# Patient Record
Sex: FEMALE | Race: WHITE | NOT HISPANIC OR LATINO | Employment: FULL TIME | URBAN - METROPOLITAN AREA
[De-identification: names, ages, dates, MRNs, and addresses within clinical notes are randomized per-mention and may not be internally consistent; named-entity substitution may affect disease eponyms.]

---

## 2020-09-09 ENCOUNTER — OFFICE VISIT (OUTPATIENT)
Dept: URGENT CARE | Facility: URGENT CARE | Age: 60
End: 2020-09-09
Payer: COMMERCIAL

## 2020-09-09 VITALS
DIASTOLIC BLOOD PRESSURE: 78 MMHG | HEART RATE: 80 BPM | HEIGHT: 65 IN | SYSTOLIC BLOOD PRESSURE: 158 MMHG | OXYGEN SATURATION: 98 % | BODY MASS INDEX: 33.99 KG/M2 | WEIGHT: 204 LBS | TEMPERATURE: 98.6 F

## 2020-09-09 DIAGNOSIS — R07.9 CHEST PAIN, UNSPECIFIED TYPE: Primary | ICD-10-CM

## 2020-09-09 ASSESSMENT — MIFFLIN-ST. JEOR: SCORE: 1496.22

## 2020-09-10 NOTE — PROGRESS NOTES
"Patient presents with chest pain with palpitations upon laying on her side for the last 3 days.  Patient also has vomiting and has been \"dealing with HTN.\"  She also states she is taking doxycycline that is not improving her sinusitis.  Patient has HTN and DM with a HgA1C of 9.3 (8/28).  Patient sent to ED for evaluation.   with and he will drive.  Patient left in stable condition.    "

## 2021-05-29 ENCOUNTER — RECORDS - HEALTHEAST (OUTPATIENT)
Dept: ADMINISTRATIVE | Facility: CLINIC | Age: 61
End: 2021-05-29

## 2021-08-25 ENCOUNTER — APPOINTMENT (OUTPATIENT)
Dept: CT IMAGING | Facility: HOSPITAL | Age: 61
DRG: 440 | End: 2021-08-25
Attending: STUDENT IN AN ORGANIZED HEALTH CARE EDUCATION/TRAINING PROGRAM
Payer: COMMERCIAL

## 2021-08-25 ENCOUNTER — HOSPITAL ENCOUNTER (INPATIENT)
Facility: HOSPITAL | Age: 61
LOS: 6 days | Discharge: HOME | DRG: 440 | End: 2021-08-31
Attending: STUDENT IN AN ORGANIZED HEALTH CARE EDUCATION/TRAINING PROGRAM | Admitting: HOSPITALIST
Payer: COMMERCIAL

## 2021-08-25 ENCOUNTER — APPOINTMENT (OUTPATIENT)
Dept: ULTRASOUND IMAGING | Facility: HOSPITAL | Age: 61
DRG: 440 | End: 2021-08-25
Attending: STUDENT IN AN ORGANIZED HEALTH CARE EDUCATION/TRAINING PROGRAM
Payer: COMMERCIAL

## 2021-08-25 ENCOUNTER — APPOINTMENT (OUTPATIENT)
Dept: MRI IMAGING | Facility: HOSPITAL | Age: 61
DRG: 440 | End: 2021-08-25
Attending: STUDENT IN AN ORGANIZED HEALTH CARE EDUCATION/TRAINING PROGRAM
Payer: COMMERCIAL

## 2021-08-25 DIAGNOSIS — K85.10 GALLSTONE PANCREATITIS: Primary | ICD-10-CM

## 2021-08-25 DIAGNOSIS — K85.90 ACUTE PANCREATITIS, UNSPECIFIED COMPLICATION STATUS, UNSPECIFIED PANCREATITIS TYPE: ICD-10-CM

## 2021-08-25 LAB
ALBUMIN SERPL-MCNC: 3.3 G/DL (ref 3.5–5)
ALP SERPL-CCNC: 72 U/L (ref 45–120)
ALT SERPL W P-5'-P-CCNC: 18 U/L (ref 0–45)
ANION GAP SERPL CALCULATED.3IONS-SCNC: 11 MMOL/L (ref 5–18)
AST SERPL W P-5'-P-CCNC: 19 U/L (ref 0–40)
BASOPHILS # BLD AUTO: 0 10E3/UL (ref 0–0.2)
BASOPHILS NFR BLD AUTO: 0 %
BILIRUB SERPL-MCNC: 0.7 MG/DL (ref 0–1)
BUN SERPL-MCNC: 11 MG/DL (ref 8–22)
CALCIUM SERPL-MCNC: 9 MG/DL (ref 8.5–10.5)
CHLORIDE BLD-SCNC: 103 MMOL/L (ref 98–107)
CHOLEST SERPL-MCNC: 189 MG/DL
CO2 SERPL-SCNC: 23 MMOL/L (ref 22–31)
CREAT SERPL-MCNC: 0.84 MG/DL (ref 0.6–1.1)
EOSINOPHIL # BLD AUTO: 0.1 10E3/UL (ref 0–0.7)
EOSINOPHIL NFR BLD AUTO: 1 %
ERYTHROCYTE [DISTWIDTH] IN BLOOD BY AUTOMATED COUNT: 13.3 % (ref 10–15)
GFR SERPL CREATININE-BSD FRML MDRD: 75 ML/MIN/1.73M2
GLUCOSE BLD-MCNC: 208 MG/DL (ref 70–125)
GLUCOSE BLDC GLUCOMTR-MCNC: 180 MG/DL (ref 70–125)
GLUCOSE BLDC GLUCOMTR-MCNC: 183 MG/DL (ref 70–125)
GLUCOSE BLDC GLUCOMTR-MCNC: 183 MG/DL (ref 70–125)
HCT VFR BLD AUTO: 33.1 % (ref 35–47)
HDLC SERPL-MCNC: 58 MG/DL
HGB BLD-MCNC: 10.6 G/DL (ref 11.7–15.7)
IMM GRANULOCYTES # BLD: 0 10E3/UL
IMM GRANULOCYTES NFR BLD: 0 %
LDLC SERPL CALC-MCNC: 110 MG/DL
LIPASE SERPL-CCNC: 5004 U/L (ref 0–52)
LYMPHOCYTES # BLD AUTO: 1.5 10E3/UL (ref 0.8–5.3)
LYMPHOCYTES NFR BLD AUTO: 15 %
MAGNESIUM SERPL-MCNC: 1.8 MG/DL (ref 1.8–2.6)
MCH RBC QN AUTO: 27.7 PG (ref 26.5–33)
MCHC RBC AUTO-ENTMCNC: 32 G/DL (ref 31.5–36.5)
MCV RBC AUTO: 87 FL (ref 78–100)
MONOCYTES # BLD AUTO: 0.4 10E3/UL (ref 0–1.3)
MONOCYTES NFR BLD AUTO: 4 %
NEUTROPHILS # BLD AUTO: 8.4 10E3/UL (ref 1.6–8.3)
NEUTROPHILS NFR BLD AUTO: 80 %
NRBC # BLD AUTO: 0 10E3/UL
NRBC BLD AUTO-RTO: 0 /100
PLATELET # BLD AUTO: 236 10E3/UL (ref 150–450)
POTASSIUM BLD-SCNC: 3.8 MMOL/L (ref 3.5–5)
PROT SERPL-MCNC: 7.5 G/DL (ref 6–8)
RBC # BLD AUTO: 3.82 10E6/UL (ref 3.8–5.2)
SARS-COV-2 RNA RESP QL NAA+PROBE: NEGATIVE
SODIUM SERPL-SCNC: 137 MMOL/L (ref 136–145)
TRIGL SERPL-MCNC: 104 MG/DL
WBC # BLD AUTO: 10.4 10E3/UL (ref 4–11)

## 2021-08-25 PROCEDURE — 76705 ECHO EXAM OF ABDOMEN: CPT

## 2021-08-25 PROCEDURE — 99223 1ST HOSP IP/OBS HIGH 75: CPT | Performed by: HOSPITALIST

## 2021-08-25 PROCEDURE — 83735 ASSAY OF MAGNESIUM: CPT | Performed by: HOSPITALIST

## 2021-08-25 PROCEDURE — 85025 COMPLETE CBC W/AUTO DIFF WBC: CPT | Performed by: STUDENT IN AN ORGANIZED HEALTH CARE EDUCATION/TRAINING PROGRAM

## 2021-08-25 PROCEDURE — 74176 CT ABD & PELVIS W/O CONTRAST: CPT

## 2021-08-25 PROCEDURE — 36415 COLL VENOUS BLD VENIPUNCTURE: CPT | Performed by: STUDENT IN AN ORGANIZED HEALTH CARE EDUCATION/TRAINING PROGRAM

## 2021-08-25 PROCEDURE — 250N000012 HC RX MED GY IP 250 OP 636 PS 637: Performed by: HOSPITALIST

## 2021-08-25 PROCEDURE — C9803 HOPD COVID-19 SPEC COLLECT: HCPCS

## 2021-08-25 PROCEDURE — 96360 HYDRATION IV INFUSION INIT: CPT

## 2021-08-25 PROCEDURE — 83690 ASSAY OF LIPASE: CPT | Performed by: STUDENT IN AN ORGANIZED HEALTH CARE EDUCATION/TRAINING PROGRAM

## 2021-08-25 PROCEDURE — 80053 COMPREHEN METABOLIC PANEL: CPT | Performed by: STUDENT IN AN ORGANIZED HEALTH CARE EDUCATION/TRAINING PROGRAM

## 2021-08-25 PROCEDURE — 87635 SARS-COV-2 COVID-19 AMP PRB: CPT | Performed by: STUDENT IN AN ORGANIZED HEALTH CARE EDUCATION/TRAINING PROGRAM

## 2021-08-25 PROCEDURE — 74181 MRI ABDOMEN W/O CONTRAST: CPT

## 2021-08-25 PROCEDURE — 120N000001 HC R&B MED SURG/OB

## 2021-08-25 PROCEDURE — 96361 HYDRATE IV INFUSION ADD-ON: CPT

## 2021-08-25 PROCEDURE — 99285 EMERGENCY DEPT VISIT HI MDM: CPT | Mod: 25

## 2021-08-25 PROCEDURE — 258N000003 HC RX IP 258 OP 636: Performed by: STUDENT IN AN ORGANIZED HEALTH CARE EDUCATION/TRAINING PROGRAM

## 2021-08-25 PROCEDURE — 250N000011 HC RX IP 250 OP 636: Performed by: HOSPITALIST

## 2021-08-25 PROCEDURE — 80061 LIPID PANEL: CPT | Performed by: STUDENT IN AN ORGANIZED HEALTH CARE EDUCATION/TRAINING PROGRAM

## 2021-08-25 RX ORDER — CHOLECALCIFEROL (VITAMIN D3) 50 MCG
50 TABLET ORAL DAILY
COMMUNITY

## 2021-08-25 RX ORDER — NICOTINE POLACRILEX 4 MG
15-30 LOZENGE BUCCAL
Status: DISCONTINUED | OUTPATIENT
Start: 2021-08-25 | End: 2021-08-31 | Stop reason: HOSPADM

## 2021-08-25 RX ORDER — ONDANSETRON 2 MG/ML
4 INJECTION INTRAMUSCULAR; INTRAVENOUS EVERY 6 HOURS PRN
Status: DISCONTINUED | OUTPATIENT
Start: 2021-08-25 | End: 2021-08-31 | Stop reason: HOSPADM

## 2021-08-25 RX ORDER — SODIUM CHLORIDE, SODIUM LACTATE, POTASSIUM CHLORIDE, CALCIUM CHLORIDE 600; 310; 30; 20 MG/100ML; MG/100ML; MG/100ML; MG/100ML
125 INJECTION, SOLUTION INTRAVENOUS CONTINUOUS
Status: ACTIVE | OUTPATIENT
Start: 2021-08-25 | End: 2021-08-27

## 2021-08-25 RX ORDER — ALBUTEROL SULFATE 90 UG/1
2 AEROSOL, METERED RESPIRATORY (INHALATION) EVERY 6 HOURS PRN
COMMUNITY

## 2021-08-25 RX ORDER — KETOROLAC TROMETHAMINE 15 MG/ML
15 INJECTION, SOLUTION INTRAMUSCULAR; INTRAVENOUS EVERY 6 HOURS PRN
Status: DISCONTINUED | OUTPATIENT
Start: 2021-08-25 | End: 2021-08-27

## 2021-08-25 RX ORDER — MULTIPLE VITAMINS W/ MINERALS TAB 9MG-400MCG
1 TAB ORAL DAILY
COMMUNITY

## 2021-08-25 RX ORDER — SIMVASTATIN 20 MG
20 TABLET ORAL AT BEDTIME
Status: ON HOLD | COMMUNITY
End: 2021-08-31

## 2021-08-25 RX ORDER — DEXTROSE MONOHYDRATE 25 G/50ML
25-50 INJECTION, SOLUTION INTRAVENOUS
Status: DISCONTINUED | OUTPATIENT
Start: 2021-08-25 | End: 2021-08-31 | Stop reason: HOSPADM

## 2021-08-25 RX ORDER — BISACODYL 10 MG
10 SUPPOSITORY, RECTAL RECTAL DAILY PRN
Status: DISCONTINUED | OUTPATIENT
Start: 2021-08-25 | End: 2021-08-31 | Stop reason: HOSPADM

## 2021-08-25 RX ORDER — ALBUTEROL SULFATE 90 UG/1
2 AEROSOL, METERED RESPIRATORY (INHALATION) EVERY 6 HOURS PRN
Status: DISCONTINUED | OUTPATIENT
Start: 2021-08-25 | End: 2021-08-31 | Stop reason: HOSPADM

## 2021-08-25 RX ADMIN — SODIUM CHLORIDE, POTASSIUM CHLORIDE, SODIUM LACTATE AND CALCIUM CHLORIDE 125 ML/HR: 600; 310; 30; 20 INJECTION, SOLUTION INTRAVENOUS at 15:57

## 2021-08-25 RX ADMIN — INSULIN ASPART 1 UNITS: 100 INJECTION, SOLUTION INTRAVENOUS; SUBCUTANEOUS at 19:12

## 2021-08-25 RX ADMIN — HUMAN INSULIN 8 UNITS: 100 INJECTION, SUSPENSION SUBCUTANEOUS at 17:47

## 2021-08-25 RX ADMIN — KETOROLAC TROMETHAMINE 15 MG: 15 INJECTION, SOLUTION INTRAMUSCULAR; INTRAVENOUS at 22:04

## 2021-08-25 RX ADMIN — SODIUM CHLORIDE, POTASSIUM CHLORIDE, SODIUM LACTATE AND CALCIUM CHLORIDE 125 ML/HR: 600; 310; 30; 20 INJECTION, SOLUTION INTRAVENOUS at 11:30

## 2021-08-25 RX ADMIN — SODIUM CHLORIDE, POTASSIUM CHLORIDE, SODIUM LACTATE AND CALCIUM CHLORIDE 1000 ML: 600; 310; 30; 20 INJECTION, SOLUTION INTRAVENOUS at 09:08

## 2021-08-25 ASSESSMENT — ACTIVITIES OF DAILY LIVING (ADL): DEPENDENT_IADLS:: INDEPENDENT

## 2021-08-25 ASSESSMENT — MIFFLIN-ST. JEOR: SCORE: 1519.85

## 2021-08-25 NOTE — H&P
[facility]    History and Physical - Hospitalist Service       Date of Admission:  2021  Jo-Ann Jeffery,  1960, MRN [id number]  PCP: Shell Hassan    Assessment & Plan      Jo-Ann Jeffery is a 61 year old female admitted on 2021. She has history of diabetes, lumbar disc disease, osteoarthritis, 47 medication allergies presents for evaluation of abdominal pain found to have pancreatitis    #Acute pancreatitis  Uncertain etiology.  No biliary stone seen.  Patient does not use alcohol.  No clear provoking medication, does use low-dose of ibuprofen.  Triglycerides checked and okay.  Has not had pancreatitis in the past.  MRCP showing some mild intra and extrahepatic bile duct dilation tapering smoothly to the ampulla with no stone, stricture, or mass.  Questioning whether this is duct narrowing secondary to pancreatic inflammation itself.  My inclination is that she would not need ERCP for this as long as she is clinically improving, and could consider another MRCP in perhaps 2 months to make sure it got better.  Due to her extensive medication allergies we are going to be very cautious what we use to treat her pancreatitis and try to avoid procedures.  She is okay with trial of IV Toradol.  This is only for if pain is severe-I am a little bit concerned that the only clear provocation for pancreatitis is NSAID in the first place.  IV fluid  N.p.o. except ice chips  GI consultation  Nurse paged me patient is nauseated and now wants something for nausea, Zofran not on allergy list so I did order this if patient wants it    #Insulin-dependent diabetes  Home regimen just NPH 20 units twice daily  Here continue 8 units twice daily for basal insulin coverage  Sliding scale    #Leg cramps  Patient is distressed that with n.p.o. I am not continuing her calcium and magnesium supplement.  Reassured her we can check her calcium and magnesium levels.  Heating pad ok    #47 medication  allergies  Patient saw allergist recently  If she needs abx, she tolerates doxy       Diet: NPO for Medical/Clinical Reasons Except for: Ice Chips    DVT Prophylaxis: Moderate risk. SCDs    Stearns Catheter: Not present  Central Lines: None  Code Status: Full Code Full    Clinically Significant Risk Factors Present on Admission                   Disposition Plan   Expected discharge: 08/27/2021 recommended to prior living arrangement once adequate pain management/ tolerating PO medications.     The patient's care was discussed with the Patient and Patient's Family.    Roselia Moody MD  [facility]  Text page via [facility] Paging/Directory      ______________________________________________________________________    Chief Complaint   pain    History of Present Illness   Jo-Ann Jeffery is a 61 year old female who presents for abdominal pain  She reports about 2 weeks ago, her daughter and  were having symptoms like stomach flu.  Then about 4 days ago, patient began to have similar symptoms with abdominal pain, nausea, vomiting, and low-grade fevers.  3 days ago, symptoms got worse.  T-max 99.4F.  She has not been able to tolerate much oral intake, has been dry heaving.  Belly has seemed bloated.  She had not been having many bowel movements.  She had been very tired.  She has chronic low back pain but this seemed worse.  She was having some black stools, though those have now turned back to brown.  She had 3 bowel movements already in the ER.  She is happy about this since she had not been stooling much at home.  She notes she did have some shortness of breath 2 days ago.  Complains of a headache now after lying in the hospital bed for a while.  Notes that heating pad was helping her abdominal pain quite a bit and would appreciate using this in the hospital as well.  She is worried about her 47 allergies and whether there are any medications that can help her symptoms in the hospital.  She  typically takes 200 mg of ibuprofen at home for pain.    Review of Systems    The 10 point Review of Systems is negative other than noted in the HPI or here.    Past Medical History    I have reviewed this patient's medical history and updated it with pertinent information if needed.   DM  Obesity  LBP  Lumbar disc disease  OA  Some kind of muscle inflammation disorder she is not sure of the name    Past Surgical History   I have reviewed this patient's surgical history and updated it with pertinent information if needed.  No previous surgeries    Social History   I have reviewed this patient's social history and updated it with pertinent information if needed.  Social History     Tobacco Use     Smoking status: Never Smoker     Smokeless tobacco: Never Used   Substance Use Topics     Alcohol use: None     Drug use: None       Family History   I have reviewed this patient's family history and updated it with pertinent information if needed.  Family History   Problem Relation Age of Onset     Syncope Mother      Hypertension Mother      Cerebrovascular Disease Father 62     Diabetes Father      Cerebrovascular Disease Brother 63     Gallbladder Disease Maternal Grandmother      Breast Cancer Maternal Grandmother      Gallbladder Disease Maternal Aunt      Throat cancer Maternal Aunt        Prior to Admission Medications   Prior to Admission Medications   Prescriptions Last Dose Informant Patient Reported? Taking?   Zinc Oxide-Vitamin C (ZINC PLUS VITAMIN C PO) 8/24/2021 at am  Yes Yes   Sig: Take 3 tablets by mouth daily   albuterol (PROAIR HFA/PROVENTIL HFA/VENTOLIN HFA) 108 (90 Base) MCG/ACT inhaler Past Month at July  Yes Yes   Sig: Inhale 2 puffs into the lungs every 6 hours as needed for shortness of breath / dyspnea or wheezing   calcium-magnesium (CALMAG) 500-250 MG TABS per tablet 8/24/2021 at hs  Yes Yes   Sig: Take 2 tablets by mouth every evening   calcium-magnesium (CALMAG) 500-250 MG TABS per tablet PRN   Yes Yes   Sig: Take 2 tablets by mouth daily as needed (anxiety)    insulin  UNIT/ML vial 8/24/2021 at pm  Yes Yes   Sig: Inject 20 Units Subcutaneous 2 times daily (before meals)   multivitamin w/minerals (THERA-VIT-M) tablet 8/24/2021 at am  Yes Yes   Sig: Take 1 tablet by mouth daily   simvastatin (ZOCOR) 20 MG tablet NOT TAKING  Yes No   Sig: Take 20 mg by mouth At Bedtime   vitamin D3 (CHOLECALCIFEROL) 50 mcg (2000 units) tablet 8/24/2021 at am  Yes Yes   Sig: Take 50 mcg by mouth daily 3 gummies = 2000 units      Facility-Administered Medications: None     Allergies   Allergies   Allergen Reactions     Azithromycin Hives     Cephalexin Hives     Contrast Dye Shortness Of Breath     Erythromycin Hives     hives       Moxifloxacin Hives     Penicillins Hives     nausea       Soap Hives     Tide soap, Soft soap, Hien Dish soap     Sulfamethoxazole-Trimethoprim Nausea     Amides      Aminoglycosides      Amitriptyline      Antipyrine-Benzocaine      Cefprozil Hives     Chocolate Flavor      Annotation: pudding       Ciprofloxacin Hives     Clindamycin Other (See Comments)     Severe diarrhea     Clonazepam      Hives       Dairy Products [Milk Protein Extract]      Tachycardia       Diphenhydramine Other (See Comments)     Racing heart     Fluoxetine      Hives       Glipizide Other (See Comments)     Muscle pain/muscle twitching     Guaifenesin & Derivatives      Hypertension     Hydrocodone-Acetaminophen      Iodides      swells up, burns      Iron      Lactulose Nausea and Vomiting     Latex Other (See Comments)     hands break out with latex gloves     Loperamide      Macrolides      Hives       Memantine      Metformin Headache     Methylprednisolone Nausea and Swelling     Metoclopramide      Morphine      Neomycin      ear drops-ears swell up inside     Prochlorperazine      Pseudoephedrine-Guaifenesin Cr      Quinolones      Other reaction       Sodium Chloride Swelling     Strawberry (Diagnostic)  Swelling     Sulfa Drugs      Tartrazine Difficulty breathing     Acetic Acid Rash     Antipyrine Rash     Food Palpitations     Chocolate and butterscottch, strawberries, walnuts     Imidazole Antifungals Rash and Unknown     Nuts Palpitations     Chocolate and butterscottch, strawberries, walnuts       Physical Exam   Vital Signs: Temp: 98.5  F (36.9  C) Temp src: Oral BP: (!) 140/69 Pulse: 88   Resp: 19 SpO2: 95 % O2 Device: None (Room air)    Weight: 210 lbs 5 oz    General: in no apparent distress, non-toxic and alert female lying in hospital bed oriented x3  HEENT: Head normocephalic atraumatic, oral mucosa moist. Sclerae anicteric  CV: Regular rhythm, normal rate, no murmurs  Resp: No wheezes, no rales or rhonchi, no focal consolidations  GI: Belly soft, nondistended, nontender, bowel sounds present  Skin: No rashes or lesions  Extremities: No peripheral edema  Psych: Normal affect, mood euthymic  Neuro: CNII-XII grossly intact, moving all 4 extremities    Data   Data reviewed today: I reviewed all medications, new labs and imaging results over the last 24 hours.    Recent Results (from the past 12 hour(s))   Comprehensive metabolic panel    Collection Time: 08/25/21  8:57 AM   Result Value Ref Range    Sodium 137 136 - 145 mmol/L    Potassium 3.8 3.5 - 5.0 mmol/L    Chloride 103 98 - 107 mmol/L    Carbon Dioxide (CO2) 23 22 - 31 mmol/L    Anion Gap 11 5 - 18 mmol/L    Urea Nitrogen 11 8 - 22 mg/dL    Creatinine 0.84 0.60 - 1.10 mg/dL    Calcium 9.0 8.5 - 10.5 mg/dL    Glucose 208 (H) 70 - 125 mg/dL    Alkaline Phosphatase 72 45 - 120 U/L    AST 19 0 - 40 U/L    ALT 18 0 - 45 U/L    Protein Total 7.5 6.0 - 8.0 g/dL    Albumin 3.3 (L) 3.5 - 5.0 g/dL    Bilirubin Total 0.7 0.0 - 1.0 mg/dL    GFR Estimate 75 >60 mL/min/1.73m2   Lipase    Collection Time: 08/25/21  8:57 AM   Result Value Ref Range    Lipase 5,004 (H) 0 - 52 U/L   CBC with platelets and differential    Collection Time: 08/25/21  8:57 AM   Result  Value Ref Range    WBC Count 10.4 4.0 - 11.0 10e3/uL    RBC Count 3.82 3.80 - 5.20 10e6/uL    Hemoglobin 10.6 (L) 11.7 - 15.7 g/dL    Hematocrit 33.1 (L) 35.0 - 47.0 %    MCV 87 78 - 100 fL    MCH 27.7 26.5 - 33.0 pg    MCHC 32.0 31.5 - 36.5 g/dL    RDW 13.3 10.0 - 15.0 %    Platelet Count 236 150 - 450 10e3/uL    % Neutrophils 80 %    % Lymphocytes 15 %    % Monocytes 4 %    % Eosinophils 1 %    % Basophils 0 %    % Immature Granulocytes 0 %    NRBCs per 100 WBC 0 <1 /100    Absolute Neutrophils 8.4 (H) 1.6 - 8.3 10e3/uL    Absolute Lymphocytes 1.5 0.8 - 5.3 10e3/uL    Absolute Monocytes 0.4 0.0 - 1.3 10e3/uL    Absolute Eosinophils 0.1 0.0 - 0.7 10e3/uL    Absolute Basophils 0.0 0.0 - 0.2 10e3/uL    Absolute Immature Granulocytes 0.0 <=0.0 10e3/uL    Absolute NRBCs 0.0 10e3/uL   Lipid Profile    Collection Time: 08/25/21  8:57 AM   Result Value Ref Range    Cholesterol 189 <=199 mg/dL    Triglycerides 104 <=149 mg/dL    Direct Measure HDL 58 >=50 mg/dL    LDL Cholesterol Calculated 110 <=129 mg/dL   Asymptomatic COVID-19 Virus (Coronavirus) by PCR Nasopharyngeal    Collection Time: 08/25/21  3:59 PM    Specimen: Nasopharyngeal; Swab   Result Value Ref Range    SARS CoV2 PCR Negative Negative   Glucose by meter    Collection Time: 08/25/21  5:45 PM   Result Value Ref Range    GLUCOSE BY METER POCT 183 (H) 70 - 125 mg/dL   Glucose by meter    Collection Time: 08/25/21  6:45 PM   Result Value Ref Range    GLUCOSE BY METER POCT 183 (H) 70 - 125 mg/dL   Glucose by meter    Collection Time: 08/25/21  8:21 PM   Result Value Ref Range    GLUCOSE BY METER POCT 180 (H) 70 - 125 mg/dL

## 2021-08-25 NOTE — CONSULTS
Care Management Initial Consult    General Information  Assessment completed with: Patient, Children, pt and dtr Laura  Type of CM/SW Visit: Initial Assessment    Primary Care Provider verified and updated as needed: Yes   Readmission within the last 30 days: no previous admission in last 30 days   Return Category: Progression of disease  Reason for Consult: discharge planning  Advance Care Planning: Advance Care Planning Reviewed: no concerns identified, questions answered  given HCD       Communication Assessment  Patient's communication style: spoken language (English or Bilingual)             Cognitive  Cognitive/Neuro/Behavioral: WDL                      Living Environment:   People in home: spouse, child(dora), adult     Current living Arrangements:        Able to return to prior arrangements:         Family/Social Support:  Care provided by:    Provides care for:                  Description of Support System:           Current Resources:   Patient receiving home care services: No     Community Resources: None  Equipment currently used at home: none  Supplies currently used at home: None    Employment/Financial:  Employment Status:          Financial Concerns: No concerns identified   Referral to Financial Counselor: No       Lifestyle & Psychosocial Needs:  Social Determinants of Health     Tobacco Use: Low Risk      Smoking Tobacco Use: Never Smoker     Smokeless Tobacco Use: Never Used   Alcohol Use:      Frequency of Alcohol Consumption:      Average Number of Drinks:      Frequency of Binge Drinking:    Financial Resource Strain:      Difficulty of Paying Living Expenses:    Food Insecurity:      Worried About Running Out of Food in the Last Year:      Ran Out of Food in the Last Year:    Transportation Needs:      Lack of Transportation (Medical):      Lack of Transportation (Non-Medical):    Physical Activity:      Days of Exercise per Week:      Minutes of Exercise per Session:    Stress:       Feeling of Stress :    Social Connections:      Frequency of Communication with Friends and Family:      Frequency of Social Gatherings with Friends and Family:      Attends Oriental orthodox Services:      Active Member of Clubs or Organizations:      Attends Club or Organization Meetings:      Marital Status:    Intimate Partner Violence:      Fear of Current or Ex-Partner:      Emotionally Abused:      Physically Abused:      Sexually Abused:    Depression:      PHQ-2 Score:    Housing Stability:      Unable to Pay for Housing in the Last Year:      Number of Places Lived in the Last Year:      Unstable Housing in the Last Year:        Functional Status:  Prior to admission patient needed assistance:   Dependent ADLs:: Independent  Dependent IADLs:: Independent  Assesssment of Functional Status: At functional baseline    Mental Health Status:  Mental Health Status: No Current Concerns       Chemical Dependency Status:                Values/Beliefs:  Spiritual, Cultural Beliefs, Oriental orthodox Practices, Values that affect care:                 Additional Information:  Assessed, lives w/spouse and dtr, Laura [phone], spouse will transport at discharge, no svcs and independent. Pt did ask to see a non-[facility] , the [facility] [place] [place] and primary RN made aware of request, may not see one tonight. Given HCD form.       Elvis Baumann RN

## 2021-08-25 NOTE — PHARMACY-ADMISSION MEDICATION HISTORY
Pharmacy Note - Admission Medication History    ______________________________________________________________________    Prior To Admission (PTA) med list completed and updated in EMR.       PTA Med List   Medication Sig Last Dose     albuterol (PROAIR HFA/PROVENTIL HFA/VENTOLIN HFA) 108 (90 Base) MCG/ACT inhaler Inhale 2 puffs into the lungs every 6 hours as needed for shortness of breath / dyspnea or wheezing Past Month at July     calcium-magnesium (CALMAG) 500-250 MG TABS per tablet Take 2 tablets by mouth every evening 8/24/2021 at hs     calcium-magnesium (CALMAG) 500-250 MG TABS per tablet Take 2 tablets by mouth daily as needed (anxiety)  PRN     insulin  UNIT/ML vial Inject 20 Units Subcutaneous 2 times daily (before meals) 8/24/2021 at pm     multivitamin w/minerals (THERA-VIT-M) tablet Take 1 tablet by mouth daily 8/24/2021 at am     vitamin D3 (CHOLECALCIFEROL) 50 mcg (2000 units) tablet Take 50 mcg by mouth daily 3 gummies = 2000 units 8/24/2021 at am     Zinc Oxide-Vitamin C (ZINC PLUS VITAMIN C PO) Take 3 tablets by mouth daily 8/24/2021 at am       Information source(s): [facility]  Method of interview communication: in-person    Summary of Changes to PTA Med List  New: all  Discontinued: hydroxyzine, ondansetron  Changed:     Patient was asked about OTC/herbal products specifically.  PTA med list reflects this.    In the past week, patient estimated taking medication this percent of the time:  greater than 90%.    Allergies were reviewed, assessed, and updated with the patient.      Patient does not use any multi-dose medications prior to admission.    The information provided in this note is only as accurate as the sources available at the time of the update(s).    Thank you,  Lore Oakley, [facility]  8/25/2021 1:55 PM

## 2021-08-25 NOTE — ED PROVIDER NOTES
Emergency Department Encounter         FINAL IMPRESSION:  Pancreatitis         ED COURSE AND MEDICAL DECISION MAKING   8:35 AM I met with the patient for initial exam and diagnoses. Discussed plan of care and ED course including labs and imaging.  11:29 AM I rechecked and updated the patient. She says she can't stay in the hospital because she has no insurance and, thus, can not pay the hospital bill. Plan to give her fluids and then reevaluate her for discharge.  12:18 PM I talked with the patient about discharge plans.    ED Course as of Aug 25 1427   Wed Aug 25, 2021   0842 Patient is a morbidly obese 61-year-old hypertensive diabetic female here with 4 days of nausea vomiting and decreased ability to tolerate p.o.  Also with increasing abdominal pain.  Patient's 2 other family numbers had similar symptoms over the past few weeks.  Denies any chest pain or trouble breathing.  Low-grade fevers.  Normal urination.  Normal bowel movements.  No leg swelling.  On arrival her vitals are stable.  She looks well clinically.  Heart and lungs normal.  Generalized abdominal discomfort to palpation.  No guarding rebound.  Patient declining pain medication.  No nausea at this time.  Plan for labs CT fluids and reevaluate.      1427 Patient accepted from Dr. Moody hospitalist.          Pancreatitis on labs and imaging.  Mild dilatation of patient's biliary system.  MRCP ordered.  -Plan for admission.  No beds in the hospital system and for however patient does not want to be transferred because of financial reasons.  She will be boarded here.  -                At the conclusion of the encounter I discussed the results of all the tests and the disposition. The questions were answered. The patient or family acknowledged understanding and was agreeable with the care plan.                  MEDICATIONS GIVEN IN THE EMERGENCY DEPARTMENT:  Medications   lactated ringers BOLUS 1,000 mL (0 mLs Intravenous Stopped 8/25/21 4478)      Followed by   lactated ringers infusion (125 mL/hr Intravenous New Bag 8/25/21 1130)       NEW PRESCRIPTIONS STARTED AT TODAY'S ED VISIT:  New Prescriptions    No medications on file       HPI     Patient information obtained from: patient     Use of Interpretor: N/A    Jo-Ann Jeffery is a 61 year old female with a pertinent history of DM type II, hyperlipidemia, vitamin D deficiency, anemia who presents to this ED by car for evaluation of abdominal pain.    About 3 days ago, patient was onset with abdominal pain that has worsened in severity 2 days ago; with associated nausea, vomiting, fever of 99.4 F. Patient describes abdominal pain as feeling bloated and pressure. In the ED, nausea and vomiting has since resolved. Patient reports  and daughter had a stomach virus and had similar symptoms as well.  Denies diarrhea, chest pain, shortness of breath.She reports not normally eating or drinking much due to being diabetic. No history of surgery or other medical complications.    REVIEW OF SYSTEMS:  Review of Systems   Constitutional: Negative for malaise. Positive for fever.  HEENT: Negative runny nose, sore throat, ear pain, neck pain  Respiratory: Negative for shortness of breath, cough, congestion  Cardiovascular: Negative for chest pain, leg edema  Gastrointestinal: Negative for abdominal distention, abdominal pain, constipation, diarrhea. Positive for nausea, vomiting.  Genitourinary: Negative for dysuria and hematuria.   Integument: Negative for rash, skin breakdown  Neurological: Negative for paresthesias, weakness, headache.  Musculoskeletal: Negative for joint pain, joint swelling      All other systems reviewed and are negative.          MEDICAL HISTORY     No past medical history on file.    No past surgical history on file.    Social History     Tobacco Use     Smoking status: Never Smoker     Smokeless tobacco: Never Used   Substance Use Topics     Alcohol use: Not on file     Drug use: Not on  file       hydrOXYzine HCL (ATARAX) 25 MG tablet  ondansetron (ZOFRAN-ODT) 4 MG disintegrating tablet            PHYSICAL EXAM     BP (!) 189/82   Pulse 81   Temp 98.2  F (36.8  C) (Oral)   Resp 16   Wt 93.4 kg (206 lb)   SpO2 97%   BMI 34.28 kg/m        PHYSICAL EXAM:     General: Patient appears well, nontoxic, comfortable  HEENT: Moist mucous membranes, no tongue swelling.  No head trauma.  No midline neck pain.  Cardiovascular: Normal rate, normal rhythm, no extremity edema.  No appreciable murmur.  Respiratory: No signs of respiratory distress, lungs are clear to auscultation bilaterally with no wheezes rhonchi or rales.  Abdominal: Soft, generalized tenderness, nondistended, no palpable masses, no guarding, no rebound. Generalized abdominal discomfort to palpation.  Musculoskeletal: Full range of motion of joints, no deformities appreciated.  Neurological: Alert and oriented, grossly neurologically intact.  Psychological: Normal affect and mood.  Integument: No rashes appreciated          RESULTS       Labs Ordered and Resulted from Time of ED Arrival Up to the Time of Departure from the ED   COMPREHENSIVE METABOLIC PANEL - Abnormal; Notable for the following components:       Result Value    Glucose 208 (*)     Albumin 3.3 (*)     All other components within normal limits   LIPASE - Abnormal; Notable for the following components:    Lipase 5,004 (*)     All other components within normal limits   CBC WITH PLATELETS AND DIFFERENTIAL - Abnormal; Notable for the following components:    Hemoglobin 10.6 (*)     Hematocrit 33.1 (*)     Absolute Neutrophils 8.4 (*)     All other components within normal limits   CBC WITH PLATELETS & DIFFERENTIAL    Narrative:     The following orders were created for panel order CBC with platelets differential.  Procedure                               Abnormality         Status                     ---------                               -----------         ------                      CBC with platelets and d...[[id number]]  Abnormal            Final result                 Please view results for these tests on the individual orders.   PERIPHERAL IV CATHETER       Abdomen US, limited (RUQ only)   Final Result   IMPRESSION:   1.  Mild extrahepatic biliary ductal dilatation and borderline dilatation of the main pancreatic duct. No obstructing stone is identified on this exam. Further evaluation with MRCP may be helpful.   2.  8 mm non shadowing nodular structure in the gallbladder neck, more likely a gallbladder polyp rather than gallstone. Recommend 6-12 month ultrasound follow-up. No evidence of acute cholecystitis.            CT Abdomen Pelvis w/o Contrast   Final Result   IMPRESSION:    1.  Acute pancreatitis.   2.  Cholelithiasis.                           PROCEDURES:  Procedures:  Procedures       I, Alfredo Marlow am serving as a scribe to document services personally performed by Casey Liz DO, based on my observations and the provider's statements to me.  I, Casey Liz DO, attest that Alfredo Marlow is acting in a scribe capacity, has observed my performance of the services and has documented them in accordance with my direction.    Casey Liz DO  Emergency Medicine  [facility] EMERGENCY DEPARTMENT     Casey Liz DO  08/25/21 1428       Casey Liz DO  08/25/21 1523

## 2021-08-25 NOTE — ED TRIAGE NOTES
Patient presents here with abdominal pain accompanied by low grade fever, retching. No diarrhea. Her family members have had similar symptoms as well. Increased fatigue. She is an insulin depentent diabetic. Her blood sugars have been in the 150's this morning

## 2021-08-25 NOTE — ED NOTES
Called lab, add on labs for lipid profile and triglycerides were not yet started, they will run them now.

## 2021-08-26 LAB
ALBUMIN SERPL-MCNC: 3.1 G/DL (ref 3.5–5)
ALP SERPL-CCNC: 67 U/L (ref 45–120)
ALT SERPL W P-5'-P-CCNC: 16 U/L (ref 0–45)
ANION GAP SERPL CALCULATED.3IONS-SCNC: 11 MMOL/L (ref 5–18)
AST SERPL W P-5'-P-CCNC: 18 U/L (ref 0–40)
BILIRUB DIRECT SERPL-MCNC: 0.2 MG/DL
BILIRUB SERPL-MCNC: 0.7 MG/DL (ref 0–1)
BUN SERPL-MCNC: 8 MG/DL (ref 8–22)
CALCIUM SERPL-MCNC: 9.3 MG/DL (ref 8.5–10.5)
CALCIUM, IONIZED MEASURED: 1.16 MMOL/L (ref 1.11–1.3)
CHLORIDE BLD-SCNC: 107 MMOL/L (ref 98–107)
CO2 SERPL-SCNC: 23 MMOL/L (ref 22–31)
CREAT SERPL-MCNC: 0.77 MG/DL (ref 0.6–1.1)
ERYTHROCYTE [DISTWIDTH] IN BLOOD BY AUTOMATED COUNT: 13.2 % (ref 10–15)
GFR SERPL CREATININE-BSD FRML MDRD: 84 ML/MIN/1.73M2
GGT SERPL-CCNC: 18 U/L (ref 0–50)
GLUCOSE BLD-MCNC: 155 MG/DL (ref 70–125)
GLUCOSE BLDC GLUCOMTR-MCNC: 128 MG/DL (ref 70–125)
GLUCOSE BLDC GLUCOMTR-MCNC: 154 MG/DL (ref 70–125)
GLUCOSE BLDC GLUCOMTR-MCNC: 157 MG/DL (ref 70–125)
GLUCOSE BLDC GLUCOMTR-MCNC: 179 MG/DL (ref 70–125)
HBA1C MFR BLD: 8.9 %
HCT VFR BLD AUTO: 33.9 % (ref 35–47)
HGB BLD-MCNC: 10.8 G/DL (ref 11.7–15.7)
ION CA PH 7.4: 1.15 MMOL/L (ref 1.11–1.3)
MCH RBC QN AUTO: 27.6 PG (ref 26.5–33)
MCHC RBC AUTO-ENTMCNC: 31.9 G/DL (ref 31.5–36.5)
MCV RBC AUTO: 87 FL (ref 78–100)
PH: 7.39 (ref 7.35–7.45)
PLATELET # BLD AUTO: 248 10E3/UL (ref 150–450)
POTASSIUM BLD-SCNC: 4 MMOL/L (ref 3.5–5)
PROT SERPL-MCNC: 7.3 G/DL (ref 6–8)
RBC # BLD AUTO: 3.91 10E6/UL (ref 3.8–5.2)
SODIUM SERPL-SCNC: 141 MMOL/L (ref 136–145)
WBC # BLD AUTO: 9.7 10E3/UL (ref 4–11)

## 2021-08-26 PROCEDURE — 85027 COMPLETE CBC AUTOMATED: CPT | Performed by: HOSPITALIST

## 2021-08-26 PROCEDURE — 99232 SBSQ HOSP IP/OBS MODERATE 35: CPT | Performed by: INTERNAL MEDICINE

## 2021-08-26 PROCEDURE — 250N000011 HC RX IP 250 OP 636: Performed by: INTERNAL MEDICINE

## 2021-08-26 PROCEDURE — C9113 INJ PANTOPRAZOLE SODIUM, VIA: HCPCS | Performed by: INTERNAL MEDICINE

## 2021-08-26 PROCEDURE — 80048 BASIC METABOLIC PNL TOTAL CA: CPT | Performed by: HOSPITALIST

## 2021-08-26 PROCEDURE — 120N000001 HC R&B MED SURG/OB

## 2021-08-26 PROCEDURE — 82248 BILIRUBIN DIRECT: CPT | Performed by: HOSPITALIST

## 2021-08-26 PROCEDURE — 250N000013 HC RX MED GY IP 250 OP 250 PS 637: Performed by: INTERNAL MEDICINE

## 2021-08-26 PROCEDURE — 250N000011 HC RX IP 250 OP 636: Performed by: HOSPITALIST

## 2021-08-26 PROCEDURE — 36415 COLL VENOUS BLD VENIPUNCTURE: CPT | Performed by: HOSPITALIST

## 2021-08-26 PROCEDURE — 83036 HEMOGLOBIN GLYCOSYLATED A1C: CPT | Performed by: HOSPITALIST

## 2021-08-26 PROCEDURE — 258N000003 HC RX IP 258 OP 636: Performed by: HOSPITALIST

## 2021-08-26 PROCEDURE — 82977 ASSAY OF GGT: CPT | Performed by: INTERNAL MEDICINE

## 2021-08-26 PROCEDURE — 82330 ASSAY OF CALCIUM: CPT | Performed by: HOSPITALIST

## 2021-08-26 RX ORDER — MAGNESIUM OXIDE 400 MG/1
400 TABLET ORAL DAILY PRN
Status: DISCONTINUED | OUTPATIENT
Start: 2021-08-26 | End: 2021-08-31 | Stop reason: HOSPADM

## 2021-08-26 RX ORDER — CALCIUM CARBONATE 500 MG/1
1000 TABLET, CHEWABLE ORAL DAILY PRN
Status: DISCONTINUED | OUTPATIENT
Start: 2021-08-26 | End: 2021-08-31 | Stop reason: HOSPADM

## 2021-08-26 RX ORDER — MULTIPLE VITAMINS W/ MINERALS TAB 9MG-400MCG
1 TAB ORAL DAILY
Status: DISCONTINUED | OUTPATIENT
Start: 2021-08-26 | End: 2021-08-31 | Stop reason: HOSPADM

## 2021-08-26 RX ORDER — MAGNESIUM OXIDE 400 MG/1
400 TABLET ORAL AT BEDTIME
Status: DISCONTINUED | OUTPATIENT
Start: 2021-08-26 | End: 2021-08-31 | Stop reason: HOSPADM

## 2021-08-26 RX ORDER — CALCIUM CARBONATE 500 MG/1
1000 TABLET, CHEWABLE ORAL AT BEDTIME
Status: DISCONTINUED | OUTPATIENT
Start: 2021-08-26 | End: 2021-08-31 | Stop reason: HOSPADM

## 2021-08-26 RX ORDER — SODIUM CHLORIDE, SODIUM LACTATE, POTASSIUM CHLORIDE, CALCIUM CHLORIDE 600; 310; 30; 20 MG/100ML; MG/100ML; MG/100ML; MG/100ML
INJECTION, SOLUTION INTRAVENOUS CONTINUOUS
Status: DISCONTINUED | OUTPATIENT
Start: 2021-08-26 | End: 2021-08-26

## 2021-08-26 RX ADMIN — INSULIN ASPART 1 UNITS: 100 INJECTION, SOLUTION INTRAVENOUS; SUBCUTANEOUS at 00:54

## 2021-08-26 RX ADMIN — PANTOPRAZOLE SODIUM 40 MG: 40 INJECTION, POWDER, FOR SOLUTION INTRAVENOUS at 11:54

## 2021-08-26 RX ADMIN — SODIUM CHLORIDE, POTASSIUM CHLORIDE, SODIUM LACTATE AND CALCIUM CHLORIDE 125 ML/HR: 600; 310; 30; 20 INJECTION, SOLUTION INTRAVENOUS at 10:10

## 2021-08-26 RX ADMIN — INSULIN ASPART 1 UNITS: 100 INJECTION, SOLUTION INTRAVENOUS; SUBCUTANEOUS at 17:18

## 2021-08-26 RX ADMIN — SODIUM CHLORIDE, POTASSIUM CHLORIDE, SODIUM LACTATE AND CALCIUM CHLORIDE 125 ML/HR: 600; 310; 30; 20 INJECTION, SOLUTION INTRAVENOUS at 02:32

## 2021-08-26 RX ADMIN — KETOROLAC TROMETHAMINE 15 MG: 15 INJECTION, SOLUTION INTRAMUSCULAR; INTRAVENOUS at 14:25

## 2021-08-26 RX ADMIN — KETOROLAC TROMETHAMINE 15 MG: 15 INJECTION, SOLUTION INTRAMUSCULAR; INTRAVENOUS at 21:00

## 2021-08-26 RX ADMIN — MULTIPLE VITAMINS W/ MINERALS TAB 1 TABLET: TAB at 18:34

## 2021-08-26 RX ADMIN — HUMAN INSULIN 8 UNITS: 100 INJECTION, SUSPENSION SUBCUTANEOUS at 17:14

## 2021-08-26 RX ADMIN — KETOROLAC TROMETHAMINE 15 MG: 15 INJECTION, SOLUTION INTRAMUSCULAR; INTRAVENOUS at 06:30

## 2021-08-26 RX ADMIN — INSULIN ASPART 1 UNITS: 100 INJECTION, SOLUTION INTRAVENOUS; SUBCUTANEOUS at 06:41

## 2021-08-26 RX ADMIN — HUMAN INSULIN 8 UNITS: 100 INJECTION, SUSPENSION SUBCUTANEOUS at 09:05

## 2021-08-26 NOTE — PLAN OF CARE
"Patient was admitted in the unit from ED at 1950.    Problem: Pain (Pancreatitis)  Goal: Acceptable Pain Control  Outcome: Improving    D: Reported of epigastric pain <3/10. Initially complained of nausea,      no emesis. Ambulated in the hallway as soon as she got in the unit       - stable gait, independent.  A: Hospitalist paged re: anti-emetic.  Offered PRN pain med, healing arts oils,      which she declined.  R: Nausea has resolved. \"Tolerable\" pain. Will continue with plan of care.       - C/O headache this evening, \"must be stress related.\" Toradol IV given.  - mIVF infusing. NPO. Voiding, monitoring outputs.  "

## 2021-08-26 NOTE — ED NOTES
[facility] ED Handoff Report    ED Chief Complaint:  chief complaint    ED Diagnosis:  (K85.90) Acute pancreatitis, unspecified complication status, unspecified pancreatitis type  Comment:  Plan: NPO/gut rest/fluids/pain control       PMH:  No past medical history on file.     Code Status:  No Order     Falls Risk: No    Current Living Situation/Residence: From home with family.      Elimination Status:  Continent    Activity Level:  Independent    Patients Preferred Language:  English     Needed: No    Infection:  [unfilled]     Vital Signs:  BP (!) 142/69   Pulse 79   Temp 98.2  F (36.8  C) (Oral)   Resp 16   Wt 93.4 kg (206 lb)   SpO2 94%   BMI 34.28 kg/m       Cardiac Rhythm: NA    Pain Score:  2/10    Is the Patient Confused:  No    Last Food or Drink: No food since ED admit    Focused Assessment:  Able to call and make needs known.    Tests Performed:  CT, labs    Abnormal Results:    Abnormal Labs Reviewed   COMPREHENSIVE METABOLIC PANEL - Abnormal; Notable for the following components:       Result Value    Glucose 208 (*)     Albumin 3.3 (*)     All other components within normal limits   LIPASE - Abnormal; Notable for the following components:    Lipase 5,004 (*)     All other components within normal limits   CBC WITH PLATELETS AND DIFFERENTIAL - Abnormal; Notable for the following components:    Hemoglobin 10.6 (*)     Hematocrit 33.1 (*)     Absolute Neutrophils 8.4 (*)     All other components within normal limits   GLUCOSE BY METER - Abnormal; Notable for the following components:    GLUCOSE BY METER POCT 183 (*)     All other components within normal limits   GLUCOSE BY METER - Abnormal; Notable for the following components:    GLUCOSE BY METER POCT 183 (*)     All other components within normal limits       MR Abdomen MRCP without Contrast   Final Result   IMPRESSION:   1.  Parenchymal edema throughout the pancreas and within the adjacent peripancreatic and retroperitoneal  fat with uncomplicated acute pancreatitis.   2.  Mild intra and extrahepatic bile duct dilatation that tapers smoothly to the ampulla with no stone, stricture or mass.   3.  Trace amount of stone material and minimal dilatation of the otherwise normal-appearing gallbladder.   4.  Mild diffuse hepatic steatosis.      Abdomen US, limited (RUQ only)   Final Result   IMPRESSION:   1.  Mild extrahepatic biliary ductal dilatation and borderline dilatation of the main pancreatic duct. No obstructing stone is identified on this exam. Further evaluation with MRCP may be helpful.   2.  8 mm non shadowing nodular structure in the gallbladder neck, more likely a gallbladder polyp rather than gallstone. Recommend 6-12 month ultrasound follow-up. No evidence of acute cholecystitis.            CT Abdomen Pelvis w/o Contrast   Final Result   IMPRESSION:    1.  Acute pancreatitis.   2.  Cholelithiasis.              Treatments Provided:  IV fluids    Family Dynamics/Concerns: NO    Family Updated On Visitor Policy: Yes    Plan of Care Communicated to Family: Yes    Who Was Updated about Plan of Care: Patient who has spoke with spouse/child.    Belongings Checklist Done and Signed by Patient: No    Covid-Negative    ED Medications:    Medications   lactated ringers BOLUS 1,000 mL (0 mLs Intravenous Stopped 8/25/21 1105)     Followed by   lactated ringers infusion (125 mL/hr Intravenous Rate/Dose Verify 8/25/21 1845)   insulin NPH injection 8 Units (8 Units Subcutaneous Given 8/25/21 1747)   ketorolac (TORADOL) injection 15 mg (has no administration in time range)   insulin aspart (NovoLOG) injection (RAPID ACTING) (1 Units Subcutaneous Given 8/25/21 1912)   glucose gel 15-30 g (has no administration in time range)     Or   dextrose 50 % injection 25-50 mL (has no administration in time range)     Or   glucagon injection 1 mg (has no administration in time range)        Additional Information: Able to call, and make needs known. Wants  IV pole so she can walk independtly    Ferny FANG 8/25/2021 7:26 PM

## 2021-08-26 NOTE — CONSULTS
CONSULTING PHYSICIAN   Kristopher Levin MD     REASON FOR CONSULTATION   pancreatitis     IMPRESSION   1) Acute pancreatitis- she does have gallstones but the most likely in her case is infectious. She had two close contact family members with similar symptoms in the week prior, suggesting a viral cause. She is clinically improving and without necrotizing features on imaging. She does not drink alcohol and TG's are normal. No culprit meds aside from NSAIDS. No prodromal weight loss or abdominal symptoms to suggest underlying malignancy.   2) Chronic back pain- in physical therapy  3) Multiple drug allergies  4) IDDM  5) Normocytic anemia- unclear cause. I am not convinced there is an active GI bleed issue, though there were some reported black stools at home prior to presentation. If anything, they may have been related to an acute gastrointestinal illness with nausea/vomiting and perhaps upper GI friability.      RECOMMENDATIONS   -Typically we recommend aggressive IVF hydration for pancreatitis, though I did not write for a bolus due to reported history of prior swelling with saline infusion.   -Clear liquid diet and slowly ADAT to low fat  -I started pantoprazole therapy due to reported black stools at home.   -At this point I am less concerned about her small gallstones as a cause of pancreatitis, so do not recommend surgical evaluation. If she has recurrent episodes then cholecystectomy would need to be considered.  -Discussed potential repeat imaging of the pancreas to confirm resolution (and rule out underlying malignancy). Options include pancreatic protocol CT scan vs EUS. I lean towards CT given the likelihood that this was viral in nature.  -No indication for ERCP.  -Avoid NSAIDS as able, though I realize options are limited due to her allergies.  -Will follow up tomorrow       HISTORY OF PRESENT ILLNESS   Jo-Ann Jeffery is a pleasant 61 year old female with history of  multiple medical allergies, chronic back pain on NSAIDS, and IDDM who presented with acute upper abdominal pain, nausea, and vomiting. Her  and daughter had been ill with similar symptoms the week prior. She had a low grade temperature at home as well. On presentation she was found to have a lipase of 5004, with normal liver tests and TG's. CT scan and MRCP showed signs of uncomplicated pancreatitis. There was mild intra/extrahepatic biliary dilation tapering to the ampulla, but no obvious stones in the duct. Small gallstones were seen in the gallbladder.     This morning she is thirsty. Pain is improved. She has not had stools since arrival on the floor, but had several in the ER. There was a report of black stools at home, but not witnessed in the hospital.     She denies recent alcohol use, nor prior history of alcohol abuse. She does take occasional ibuprofen 200mg for back pain. She denies illicit drug use. There is no personal history of biliary colic, nor family history of gallstones or pancreatitis.      PAST HISTORY   No past medical history on file.   No past surgical history on file.     Family History Social History   Family History   Problem Relation Age of Onset     Syncope Mother      Hypertension Mother      Cerebrovascular Disease Father 62     Diabetes Father      Cerebrovascular Disease Brother 63     Gallbladder Disease Maternal Grandmother      Breast Cancer Maternal Grandmother      Gallbladder Disease Maternal Aunt      Throat cancer Maternal Aunt     Social History     Socioeconomic History     Marital status:      Spouse name: None     Number of children: None     Years of education: None     Highest education level: None   Occupational History     None   Tobacco Use     Smoking status: Never Smoker     Smokeless tobacco: Never Used   Substance and Sexual Activity     Alcohol use: None     Drug use: None     Sexual activity: None   Other Topics Concern     None   Social History  Narrative     None     Social Determinants of Health     Financial Resource Strain:      Difficulty of Paying Living Expenses:    Food Insecurity:      Worried About Running Out of Food in the Last Year:      Ran Out of Food in the Last Year:    Transportation Needs:      Lack of Transportation (Medical):      Lack of Transportation (Non-Medical):    Physical Activity:      Days of Exercise per Week:      Minutes of Exercise per Session:    Stress:      Feeling of Stress :    Social Connections:      Frequency of Communication with Friends and Family:      Frequency of Social Gatherings with Friends and Family:      Attends Gnosticist Services:      Active Member of Clubs or Organizations:      Attends Club or Organization Meetings:      Marital Status:    Intimate Partner Violence:      Fear of Current or Ex-Partner:      Emotionally Abused:      Physically Abused:      Sexually Abused:          MEDICATIONS & ALLERGIES   Medications Prior to Admission   Medication Sig Dispense Refill Last Dose     albuterol (PROAIR HFA/PROVENTIL HFA/VENTOLIN HFA) 108 (90 Base) MCG/ACT inhaler Inhale 2 puffs into the lungs every 6 hours as needed for shortness of breath / dyspnea or wheezing   Past Month at July     calcium-magnesium (CALMAG) 500-250 MG TABS per tablet Take 2 tablets by mouth every evening   8/24/2021 at hs     calcium-magnesium (CALMAG) 500-250 MG TABS per tablet Take 2 tablets by mouth daily as needed (anxiety)    PRN     insulin  UNIT/ML vial Inject 20 Units Subcutaneous 2 times daily (before meals)   8/24/2021 at pm     multivitamin w/minerals (THERA-VIT-M) tablet Take 1 tablet by mouth daily   8/24/2021 at am     vitamin D3 (CHOLECALCIFEROL) 50 mcg (2000 units) tablet Take 50 mcg by mouth daily 3 gummies = 2000 units   8/24/2021 at am     Zinc Oxide-Vitamin C (ZINC PLUS VITAMIN C PO) Take 3 tablets by mouth daily   8/24/2021 at am     simvastatin (ZOCOR) 20 MG tablet Take 20 mg by mouth At Bedtime   NOT  TAKING        ALLERGIES   Allergies   Allergen Reactions     Azithromycin Hives     Cephalexin Hives     Contrast Dye Shortness Of Breath     Erythromycin Hives     hives       Moxifloxacin Hives     Penicillins Hives     nausea       Soap Hives     Tide soap, Soft soap, Hien Dish soap     Sulfamethoxazole-Trimethoprim Nausea     Amides      Aminoglycosides      Amitriptyline      Antipyrine-Benzocaine      Cefprozil Hives     Chocolate Flavor      Annotation: pudding       Ciprofloxacin Hives     Clindamycin Other (See Comments)     Severe diarrhea     Clonazepam      Hives       Dairy Products [Milk Protein Extract]      Tachycardia       Diphenhydramine Other (See Comments)     Racing heart     Fluoxetine      Hives       Glipizide Other (See Comments)     Muscle pain/muscle twitching     Guaifenesin & Derivatives      Hypertension     Hydrocodone-Acetaminophen      Iodides      swells up, burns      Iron      Lactulose Nausea and Vomiting     Latex Other (See Comments)     hands break out with latex gloves     Loperamide      Macrolides      Hives       Memantine      Metformin Headache     Methylprednisolone Nausea and Swelling     Metoclopramide      Morphine      Neomycin      ear drops-ears swell up inside     Prochlorperazine      Pseudoephedrine-Guaifenesin Cr      Quinolones      Other reaction       Sodium Chloride Swelling     Strawberry (Diagnostic) Swelling     Sulfa Drugs      Tartrazine Difficulty breathing     Acetic Acid Rash     Antipyrine Rash     Food Palpitations     Chocolate and butterscottch, strawberries, walnuts     Imidazole Antifungals Rash and Unknown     Nuts Palpitations     Chocolate and butterscottch, strawberries, walnuts         REVIEW OF SYSTEMS     See HPI for pertinent positives and negatives. A comprehensive review of systems was performed and was otherwise noncontributory.     OBJECTIVE   Vitals Blood pressure 135/64, pulse 72, temperature 97.9  F (36.6  C), temperature  "source Oral, resp. rate 18, height 1.651 m (5' 5\"), weight 95.4 kg (210 lb 5 oz), SpO2 95 %.           Physical  Exam   GENERAL: alert and oriented, no acute distress.     HEENT: atraumatic, anicteric, moist mucous membranes, neck soft/supple     PULMONARY: normal resp effort, breath sounds clear to auscultation bilaterally    CARDIOVASCULAR: normal rate and rhythm, no murmurs    ABDOMEN: soft, mild tenderness, no distention, bowel sounds normal. No hepatosplenomegaly.     MUSCULOSKELETAL: joints grossly normal    NEUROLOGICAL: appropriate mental status, grossly intact    PSYCHIATRIC: normal mood, affect and insight    SKIN: warm and dry, no rashes    EXT: no edema        LABORATORY    ELECTROLYTE PANEL   Recent Labs   Lab 08/26/21  0630 08/26/21  0600 08/26/21  0027 08/25/21  0857   NA  --  141  --  137   POTASSIUM  --  4.0  --  3.8   CHLORIDE  --  107  --  103   CO2  --  23  --  23   * 155* 179* 208*   CR  --  0.77  --  0.84   BUN  --  8  --  11      HEMATOLOGY PANEL   Recent Labs   Lab 08/26/21  0600 08/25/21  0857   HGB 10.8* 10.6*   MCV 87 87   WBC 9.7 10.4    236      LIVER AND PANCREAS PANEL   Recent Labs   Lab 08/26/21  0600 08/25/21  0857   AST 18 19   ALT 16 18   ALKPHOS 67 72   BILITOTAL 0.7 0.7   LIPASE  --  5,004*     IMAGING STUDIES    EXAM: CT ABDOMEN PELVIS W/O CONTRAST  LOCATION: [facility]  DATE/TIME: 8/25/2021 9:21 AM     INDICATION: Abdominal pain and vomiting.  COMPARISON: CT abdomen pelvis, 03/18/2018.  TECHNIQUE: CT scan of the abdomen and pelvis was performed without IV contrast. Multiplanar reformats were obtained. Dose reduction techniques were used.  CONTRAST: None.     FINDINGS:   LOWER CHEST: Normal.     HEPATOBILIARY: There are a few tiny stones visible within the gallbladder. No biliary dilatation.     PANCREAS: There is fat stranding present about the pancreas.     SPLEEN: Normal.     ADRENAL GLANDS: Normal.     KIDNEYS/BLADDER: " Normal.     BOWEL: No bowel obstruction or abnormal bowel wall thickening. The appendix is normal.     LYMPH NODES: Normal.     VASCULATURE: Unremarkable.     PELVIC ORGANS: Normal.     MUSCULOSKELETAL: Normal.                                                                      IMPRESSION:   1.  Acute pancreatitis.  2.  Cholelithiasis.    EXAM: MR ABDOMEN MRCP WITHOUT CONTRAST  LOCATION: [facility]  DATE/TIME: 8/25/2021 3:14 PM     INDICATION: Abdominal pain, nausea and vomiting. Acute pancreatitis.  COMPARISON: Today's 08/25/2021 noncontrast CT CAP and right upper quadrant abdominal ultrasound.  TECHNIQUE: Routine MR liver/pancreas protocol including axial and coronal MRCP sequences. 2D and 3D reconstruction performed by MR technologist including MIP reconstruction and slab cholangiograms. If performed with contrast, additional dynamic T1 post   IV contrast images.  CONTRAST: None.      FINDINGS:      MRCP: Mild intra and extrahepatic bile duct dilatation that tapers smoothly to the ampulla with no stone, stricture or mass. Minimal uniform dilatation of the otherwise normal-appearing pancreatic duct. Classic pancreatic duct anatomy with known normal   variant. Trace amount of stone material and minimal dilatation of the otherwise normal-appearing gallbladder.     LIVER: Mild diffuse hepatic steatosis. Liver otherwise normal.     PANCREAS: Parenchymal edema throughout the pancreas and within the adjacent peripancreatic and retroperitoneal fat. No fluid collection.     ADDITIONAL FINDINGS: No significant abnormality in the spleen, kidneys, and adrenal glands. No adenopathy. No ascites.                                                                      IMPRESSION:  1.  Parenchymal edema throughout the pancreas and within the adjacent peripancreatic and retroperitoneal fat with uncomplicated acute pancreatitis.  2.  Mild intra and extrahepatic bile duct dilatation that tapers smoothly to the  ampulla with no stone, stricture or mass.  3.  Trace amount of stone material and minimal dilatation of the otherwise normal-appearing gallbladder.  4.  Mild diffuse hepatic steatosis.    EXAM: US ABDOMEN LIMITED  LOCATION: [facility]  DATE/TIME: 8/25/2021 10:36 AM     INDICATION: abd pain pancreatitis  COMPARISON: CT 08/25/2021  TECHNIQUE: Limited abdominal ultrasound.     FINDINGS:     GALLBLADDER: Within the gallbladder neck there is an 8 mm nonshadowing solid structure image 66. No wall thickening or edema. Negative sonographic Donovan's sign.     BILE DUCTS: No intrahepatic biliary dilatation. The common duct measures 8-10 mm.     LIVER: Slightly coarsened echotexture. Normal size and surface contour. No focal mass.     RIGHT KIDNEY: No hydronephrosis.     PANCREAS: Borderline dilated main pancreatic duct measuring 3 to 4 mm. No peripancreatic fluid collection.     No ascites.                                                                      IMPRESSION:  1.  Mild extrahepatic biliary ductal dilatation and borderline dilatation of the main pancreatic duct. No obstructing stone is identified on this exam. Further evaluation with MRCP may be helpful.  2.  8 mm non shadowing nodular structure in the gallbladder neck, more likely a gallbladder polyp rather than gallstone. Recommend 6-12 month ultrasound follow-up. No evidence of acute cholecystitis.  I have reviewed the current diagnostic and laboratory tests.                 Destiny Courtney MD  Thank you for the opportunity to participate in the care of this patient.   Please feel free to call me with any questions or concerns.  Phone number [phone].

## 2021-08-26 NOTE — PROGRESS NOTES
Spiritual Assessment:     Feeling weary and discouraged due to chronic pain    Working on balancing her needs with the needs of others    Would benefit from more support    Patient comes from [employer] kinza background and derives meaning, purpose, and comfort from kinza    Not connected to a kinza community at this time    Care Provided:   Empathic listening and presence  Helped patient in processing of emotions  Normalized/validated her feelings of weariness, fatigue, longing, and hopefulness  Explored/identified sources of strength  Discussed coping strategies and resources    Encouraged self-care  Prayer shared    Full Spiritual Care Note: Saw Jo-Ann due to request. She was relieved this morning to hear that her symptoms are likely not cancer, but probably due to a virus that has caused inflammation and swelling in her pancreas. She is walking this morning and anticipates slowly easing back into eating in the next few days. Jo-Ann shares that she is feeling overwhelmed because she has had chronic back pain in recent months and she is very limited in what pain medications she can take due to allergies. I empathized with how exhausting and discouraging chronic pain can be.     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 We talked about her ability to feel the pain of others and how this can take a toll of her physically and emotionally. She notes that she doesn't have many people in her life (outside of her  and children) who invest in her or even seem to understand what she needs. She grew up in a dysfunctional home and experiences relational tension with her siblings. Jo-Ann loves counseling and praying for others, but admits that she feels exhausted, like she has no water left in her own well. Her identity as a child of God is important to her. I encouraged her to consider how this identity may or may not change if she was not able to care  for others.     Jo-Ann is working on extending alonzo towards herself. She finds significant strength and comfort in prayer. She can point to multiple times in her life when God provided just the encouragement she needed - many of the experiences were accompanied by eagles or birds. She would like to have a dog, as she believes a dog would be a source of comfort. While we were talking, her daughter Anastasia joined us. Anastasia shared that her mom is a very friendly, giving woman, who has a heart for others. We read [facility] and prayed together. Jo-Ann has her own devotional materials with her and would like to use the chapel as she is able. She declined my offer of aromatherapy due to allergies.     Plan of Care: Will continue to provide support as patient/family needs/desires during this admission    Aline Hayes M.Div.  Staff   [phone]

## 2021-08-26 NOTE — PROGRESS NOTES
[facility]    Medicine Progress Note - Hospitalist Service    Date of Admission:  8/25/2021     Assessment & Plan     SUMMARY:  61 year old female with history of insulin dependent diabetes, lumbar disc disease, osteoarthritis, 47 medication allergies presents for evaluation of abdominal pain found to have pancreatitis    ACTIVE PROBLEM LIST   #Acute pancreatitis  Uncertain etiology, possible viral illness induced - recently with sx of acute gastrointestinal viral infection  No biliary stone seen.  Patient does not use alcohol.  No clear provoking medication, does use low-dose of ibuprofen.  Triglycerides checked and okay.  Has not had pancreatitis in the past.  MRCP showing some mild intra and extrahepatic bile duct dilation tapering smoothly to the ampulla with no stone, stricture, or mass.  Questioning whether this is duct narrowing secondary to pancreatic inflammation itself.   Consult GI - appreciate recommendations  Due to her extensive medication allergies we are going to be very cautious what we use to treat her pancreatitis and try to avoid procedures.  She is okay with trial of IV Toradol.  This is only for if pain is severe-I am a little bit concerned that the only clear provocation for pancreatitis is NSAID in the first place.  GI advancing diet to clear liquids    Anemia, chronic  Has been in the 10.5-11 range since 2018  outpatient diagnosis of iron deficiency anemia due to inadequate dietary iron intake    #Insulin-dependent diabetes  Home regimen just NPH 20 units twice daily  Here continue 8 units twice daily for basal insulin coverage  Sliding scale    #Leg cramps   Resume patient's home dosing of calcium/magnesium supplements.  Continue LR IVF.  Heating pad ok    #47 medication allergies  Patient saw allergist recently  If she needs abx, she tolerates doxy    Class II Obesity w BMI=35-39.9: Body mass index is 35 kg/m .     DVT prophylaxis:    Medical:  early ambulation     "Mechanical:  PCD's    Disposition Plan: Expected discharge: 08/28/2021   recommended to Home once adequate pain management/ tolerating PO medications.    Diet: Orders Placed This Encounter      Clear Liquid Diet    Stearns Catheter: Not present  Central Lines/Port-a-cath: Not present  Drains: Not present     --------------------------------------------    The patient's care was discussed with the Patient.    Active Problems:    Pancreatitis      Kristopher Levin MD  Hospitalist Service  [facility]  Securely message with the Vocera Web Console (learn more here)  Text page via [employer] Paging/Directory    Clinically Significant Risk Factors Present on Admission            ______________________________________________________________________    Interval History   Pain level is much improved from 7 down to a 3.  Feels hungry.    ROS: Denies chest pain, denies shortness of breath and passing urine well    Data personally reviewed from the last 24 hours:   Reviewed Laboratory results, Consultant recommendations and medications.    Physical Exam   /64 (BP Location: Left arm)   Pulse 72   Temp 97.9  F (36.6  C) (Oral)   Resp 18   Ht 1.651 m (5' 5\")   Wt 95.4 kg (210 lb 5 oz)   SpO2 95%   BMI 35.00 kg/m      Physical Exam    General Appearance:  HEENT:  Awake, Alert, Cooperative, in no distress and appears stated age   Normocephalic, atraumatic, conjunctiva clear without icterus and ears without discharge   Lungs:   Clear to auscultation bilaterally, no wheezing, good air exchange, normal work of breathing   Cardiovascular:  Regular Rate and Rythm, normal apical impulse, normal S1 and S2, no lower extremity edema bilaterally   Abdomen: Soft, Non-distended and tender With deep palpation over midepigastric area, reduced bowel sounds   Skin:  Skin color, texture normal and bruising or bleeding. No rashes or lesions over face, neck, arms and legs, turgor normal.   Neurologic:    Neuropsychiatric: " Alert & Oriented X 3, Facial symmetry preserved and upper & lower extremities moving well with symmetry  Calm, normal eye contact, Affect normal     Data   Recent Labs   Lab 08/26/21  1153 08/26/21  0630 08/26/21  0600 08/25/21  0857   WBC  --   --  9.7 10.4   HGB  --   --  10.8* 10.6*   MCV  --   --  87 87   PLT  --   --  248 236   NA  --   --  141 137   POTASSIUM  --   --  4.0 3.8   CHLORIDE  --   --  107 103   CO2  --   --  23 23   BUN  --   --  8 11   CR  --   --  0.77 0.84   ANIONGAP  --   --  11 11   LAKEISHA  --   --  9.3 9.0   * 154* 155* 208*   ALBUMIN  --   --  3.1* 3.3*   PROTTOTAL  --   --  7.3 7.5   BILITOTAL  --   --  0.7 0.7   ALKPHOS  --   --  67 72   ALT  --   --  16 18   AST  --   --  18 19   LIPASE  --   --   --  5,004*     Recent Results (from the past 24 hour(s))   MR Abdomen MRCP without Contrast    Narrative    EXAM: MR ABDOMEN MRCP WITHOUT CONTRAST  LOCATION: [facility]  DATE/TIME: 8/25/2021 3:14 PM    INDICATION: Abdominal pain, nausea and vomiting. Acute pancreatitis.  COMPARISON: Today's 08/25/2021 noncontrast CT CAP and right upper quadrant abdominal ultrasound.  TECHNIQUE: Routine MR liver/pancreas protocol including axial and coronal MRCP sequences. 2D and 3D reconstruction performed by MR technologist including MIP reconstruction and slab cholangiograms. If performed with contrast, additional dynamic T1 post   IV contrast images.  CONTRAST: None.     FINDINGS:     MRCP: Mild intra and extrahepatic bile duct dilatation that tapers smoothly to the ampulla with no stone, stricture or mass. Minimal uniform dilatation of the otherwise normal-appearing pancreatic duct. Classic pancreatic duct anatomy with known normal   variant. Trace amount of stone material and minimal dilatation of the otherwise normal-appearing gallbladder.    LIVER: Mild diffuse hepatic steatosis. Liver otherwise normal.    PANCREAS: Parenchymal edema throughout the pancreas and within the  adjacent peripancreatic and retroperitoneal fat. No fluid collection.    ADDITIONAL FINDINGS: No significant abnormality in the spleen, kidneys, and adrenal glands. No adenopathy. No ascites.      Impression    IMPRESSION:  1.  Parenchymal edema throughout the pancreas and within the adjacent peripancreatic and retroperitoneal fat with uncomplicated acute pancreatitis.  2.  Mild intra and extrahepatic bile duct dilatation that tapers smoothly to the ampulla with no stone, stricture or mass.  3.  Trace amount of stone material and minimal dilatation of the otherwise normal-appearing gallbladder.  4.  Mild diffuse hepatic steatosis.

## 2021-08-26 NOTE — PLAN OF CARE
Patient complained of abdominal pain and headache.  Administered Toradol prn, reported relief. Reported occassional nausea with pain but got better with time.

## 2021-08-26 NOTE — PLAN OF CARE
Painful after eating small amount (less than 8oz total) of clear liquids. States it came on gradually- just a dull pain- like her stomach is too full. IV Ketoralac given. Did walk in halls for about 10 minutes at a time (saline locked IV for long walks) denies nausea, no headache today. Low risk for falls and pressure ulc  Problem: Pain (Pancreatitis)  Goal: Acceptable Pain Control  Outcome: Improving  Intervention: Monitor and Manage Pain  Recent Flowsheet Documentation  Taken 8/26/2021 1159 by Annette Rahman RN  Pain Management Interventions: declines  Taken 8/26/2021 0904 by Annette Rahman RN  Pain Management Interventions: declines     Problem: Adult Inpatient Plan of Care  Goal: Plan of Care Review  Outcome: Improving   ers. Standard precautions in place. Annette Rahman, PAUL

## 2021-08-26 NOTE — UTILIZATION REVIEW
Admission Status; Secondary Review Determination   Under the authority of the Utilization Management Committee, the utilization review process indicated a secondary review on Jo-Ann Jeffery. The review outcome is based on review of the medical records, discussions with staff, and applying clinical experience noted on the date of the review.   (x) Inpatient Status Appropriate - This patient's medical care is consistent with medical management for inpatient care and reasonable inpatient medical practice.     RATIONALE FOR DETERMINATION   61-year-old female with insulin-dependent diabetes and 47 listed medication allergies admitted with pancreatitis seen on imaging with lipase greater than 5000..  MRCP showed some mild intrahepatic and extrahepatic bile duct dilatation but no stone or stricture or mass.  GI consulted.  Advancing diet from n.p.o. to clear liquids today.  She does have small gallstones but it is felt this has not contributed to her pancreatitis.  No current indication for ERCP and her multiple drug allergies steer management toward more conservative measures.  Also trying to avoid NSAIDs for pain given this could have caused pancreatitis.    At the time of admission with the information available to the attending physician more than 2 nights Hospital complex care was anticipated, based on patient risk of adverse outcome if treated as outpatient and complex care required. Inpatient admission is appropriate based on the Medicare guidelines.   The information on this document is developed by the utilization review team in order for the business office to ensure compliance. This only denotes the appropriateness of proper admission status and does not reflect the quality of care rendered.   The definitions of Inpatient Status and Observation Status used in making the determination above are those provided in the CMS Coverage Manual, Chapter 1 and Chapter 6, section 70.4.   Sincerely,   Jaison Becerra,  MD  Utilization Review  Physician Advisor  [facility]

## 2021-08-27 LAB
ANION GAP SERPL CALCULATED.3IONS-SCNC: 10 MMOL/L (ref 5–18)
BUN SERPL-MCNC: 8 MG/DL (ref 8–22)
CALCIUM SERPL-MCNC: 8.7 MG/DL (ref 8.5–10.5)
CHLORIDE BLD-SCNC: 104 MMOL/L (ref 98–107)
CO2 SERPL-SCNC: 23 MMOL/L (ref 22–31)
CREAT SERPL-MCNC: 0.79 MG/DL (ref 0.6–1.1)
ERYTHROCYTE [DISTWIDTH] IN BLOOD BY AUTOMATED COUNT: 13.2 % (ref 10–15)
GFR SERPL CREATININE-BSD FRML MDRD: 81 ML/MIN/1.73M2
GLUCOSE BLD-MCNC: 230 MG/DL (ref 70–125)
GLUCOSE BLDC GLUCOMTR-MCNC: 163 MG/DL (ref 70–125)
GLUCOSE BLDC GLUCOMTR-MCNC: 178 MG/DL (ref 70–125)
GLUCOSE BLDC GLUCOMTR-MCNC: 180 MG/DL (ref 70–125)
GLUCOSE BLDC GLUCOMTR-MCNC: 190 MG/DL (ref 70–125)
GLUCOSE BLDC GLUCOMTR-MCNC: 196 MG/DL (ref 70–125)
GLUCOSE BLDC GLUCOMTR-MCNC: 213 MG/DL (ref 70–125)
HCT VFR BLD AUTO: 28.7 % (ref 35–47)
HGB BLD-MCNC: 9.2 G/DL (ref 11.7–15.7)
LIPASE SERPL-CCNC: 3121 U/L (ref 0–52)
MCH RBC QN AUTO: 27.5 PG (ref 26.5–33)
MCHC RBC AUTO-ENTMCNC: 32.1 G/DL (ref 31.5–36.5)
MCV RBC AUTO: 86 FL (ref 78–100)
PLATELET # BLD AUTO: 224 10E3/UL (ref 150–450)
POTASSIUM BLD-SCNC: 3.7 MMOL/L (ref 3.5–5)
RBC # BLD AUTO: 3.34 10E6/UL (ref 3.8–5.2)
SODIUM SERPL-SCNC: 137 MMOL/L (ref 136–145)
WBC # BLD AUTO: 11.6 10E3/UL (ref 4–11)

## 2021-08-27 PROCEDURE — 36415 COLL VENOUS BLD VENIPUNCTURE: CPT | Performed by: INTERNAL MEDICINE

## 2021-08-27 PROCEDURE — 85027 COMPLETE CBC AUTOMATED: CPT | Performed by: INTERNAL MEDICINE

## 2021-08-27 PROCEDURE — 83690 ASSAY OF LIPASE: CPT | Performed by: INTERNAL MEDICINE

## 2021-08-27 PROCEDURE — 80048 BASIC METABOLIC PNL TOTAL CA: CPT | Performed by: INTERNAL MEDICINE

## 2021-08-27 PROCEDURE — 250N000013 HC RX MED GY IP 250 OP 250 PS 637: Performed by: INTERNAL MEDICINE

## 2021-08-27 PROCEDURE — 120N000001 HC R&B MED SURG/OB

## 2021-08-27 PROCEDURE — 99232 SBSQ HOSP IP/OBS MODERATE 35: CPT | Performed by: HOSPITALIST

## 2021-08-27 RX ORDER — NALOXONE HYDROCHLORIDE 0.4 MG/ML
0.4 INJECTION, SOLUTION INTRAMUSCULAR; INTRAVENOUS; SUBCUTANEOUS
Status: DISCONTINUED | OUTPATIENT
Start: 2021-08-27 | End: 2021-08-31 | Stop reason: HOSPADM

## 2021-08-27 RX ORDER — NALOXONE HYDROCHLORIDE 0.4 MG/ML
0.2 INJECTION, SOLUTION INTRAMUSCULAR; INTRAVENOUS; SUBCUTANEOUS
Status: DISCONTINUED | OUTPATIENT
Start: 2021-08-27 | End: 2021-08-31 | Stop reason: HOSPADM

## 2021-08-27 RX ADMIN — INSULIN ASPART 2 UNITS: 100 INJECTION, SOLUTION INTRAVENOUS; SUBCUTANEOUS at 06:08

## 2021-08-27 RX ADMIN — HUMAN INSULIN 8 UNITS: 100 INJECTION, SUSPENSION SUBCUTANEOUS at 08:45

## 2021-08-27 RX ADMIN — MAGNESIUM OXIDE TAB 400 MG (241.3 MG ELEMENTAL MG) 400 MG: 400 (241.3 MG) TAB at 20:53

## 2021-08-27 RX ADMIN — MULTIPLE VITAMINS W/ MINERALS TAB 1 TABLET: TAB at 08:45

## 2021-08-27 RX ADMIN — CALCIUM CARBONATE (ANTACID) CHEW TAB 500 MG 1000 MG: 500 CHEW TAB at 20:54

## 2021-08-27 RX ADMIN — INSULIN ASPART 1 UNITS: 100 INJECTION, SOLUTION INTRAVENOUS; SUBCUTANEOUS at 00:38

## 2021-08-27 ASSESSMENT — MIFFLIN-ST. JEOR: SCORE: 1507.09

## 2021-08-27 NOTE — PROGRESS NOTES
GI PROGRESS NOTE  8/27/2021  Jo-Ann Jeffery  1960  /-06    Subjective:  She is feeling slightly better with less abdominal pain.  Tolerating liquids and some crackers.  She did have significant back pain last evening and thinks it was related to the mattress and positional.     Objective:    Patient Vitals for the past 24 hrs:   BP Temp Temp src Pulse Resp SpO2   08/27/21 0815 135/63 98.2  F (36.8  C) Oral 81 19 98 %   08/27/21 0603 (!) 147/71 98.3  F (36.8  C) Oral 76 16 93 %   08/27/21 0028 (!) 154/78 98.9  F (37.2  C) Oral 73 16 97 %   08/26/21 1701 (!) 143/65 98.5  F (36.9  C) Oral 76 14 --     Body mass index is 35 kg/m .  Gen: NAD  GI: Non-distended, BS positive, soft, mildly tender upper abdomen    Laboratory  Recent Labs   Lab Test 08/27/21  1005 08/26/21  0600 08/25/21  0857   WBC 11.6* 9.7 10.4   HGB 9.2* 10.8* 10.6*   MCV 86 87 87    248 236     Recent Labs   Lab Test 08/27/21  1005 08/27/21  0817 08/27/21  0559 08/26/21  0600 08/25/21  0857     --   --  141 137   POTASSIUM 3.7  --   --  4.0 3.8   CHLORIDE 104  --   --  107 103   CO2 23  --   --  23 23   BUN 8  --   --  8 11   CR 0.79  --   --  0.77 0.84   ANIONGAP 10  --   --  11 11   LAKEISHA 8.7  --   --  9.3 9.0   * 190* 196* 155* 208*     Recent Labs   Lab Test 08/27/21  1005 08/26/21  0600 08/25/21  0857 08/20/20  0409   ALBUMIN  --  3.1* 3.3* 3.8   BILITOTAL  --  0.7 0.7 0.4   ALT  --  16 18 21   AST  --  18 19 15   ALKPHOS  --  67 72 67   LIPASE 3,121*  --  5,004* 49     8/25/2021 MRCP  FINDINGS:      MRCP: Mild intra and extrahepatic bile duct dilatation that tapers smoothly to the ampulla with no stone, stricture or mass. Minimal uniform dilatation of the otherwise normal-appearing pancreatic duct. Classic pancreatic duct anatomy with known normal   variant. Trace amount of stone material and minimal dilatation of the otherwise normal-appearing gallbladder.     LIVER: Mild diffuse hepatic steatosis. Liver  otherwise normal.     PANCREAS: Parenchymal edema throughout the pancreas and within the adjacent peripancreatic and retroperitoneal fat. No fluid collection.     ADDITIONAL FINDINGS: No significant abnormality in the spleen, kidneys, and adrenal glands. No adenopathy. No ascites.                                                                      IMPRESSION:  1.  Parenchymal edema throughout the pancreas and within the adjacent peripancreatic and retroperitoneal fat with uncomplicated acute pancreatitis.  2.  Mild intra and extrahepatic bile duct dilatation that tapers smoothly to the ampulla with no stone, stricture or mass.  3.  Trace amount of stone material and minimal dilatation of the otherwise normal-appearing gallbladder.  4.  Mild diffuse hepatic steatosis.    8/25/2021 limited abdominal ultrasound  FINDINGS:     GALLBLADDER: Within the gallbladder neck there is an 8 mm nonshadowing solid structure image 66. No wall thickening or edema. Negative sonographic Donovan's sign.     BILE DUCTS: No intrahepatic biliary dilatation. The common duct measures 8-10 mm.     LIVER: Slightly coarsened echotexture. Normal size and surface contour. No focal mass.     RIGHT KIDNEY: No hydronephrosis.     PANCREAS: Borderline dilated main pancreatic duct measuring 3 to 4 mm. No peripancreatic fluid collection.     No ascites.                                                                      IMPRESSION:  1.  Mild extrahepatic biliary ductal dilatation and borderline dilatation of the main pancreatic duct. No obstructing stone is identified on this exam. Further evaluation with MRCP may be helpful.  2.  8 mm non shadowing nodular structure in the gallbladder neck, more likely a gallbladder polyp rather than gallstone. Recommend 6-12 month ultrasound follow-up. No evidence of acute cholecystitis.    8/25/2021 CT abdomen/pelvis without contrast  FINDINGS:   LOWER CHEST: Normal.     HEPATOBILIARY: There are a few tiny stones  visible within the gallbladder. No biliary dilatation.     PANCREAS: There is fat stranding present about the pancreas.     SPLEEN: Normal.     ADRENAL GLANDS: Normal.     KIDNEYS/BLADDER: Normal.     BOWEL: No bowel obstruction or abnormal bowel wall thickening. The appendix is normal.     LYMPH NODES: Normal.     VASCULATURE: Unremarkable.     PELVIC ORGANS: Normal.     MUSCULOSKELETAL: Normal.                                                                      IMPRESSION:   1.  Acute pancreatitis.  2.  Cholelithiasis.    Assessment:  Acute pancreatitis- seems to be improving clinically.  No complications on imaging.  She denies use of alcohol, and TG were normal.  She does have gallstones but no apparent biliary obstruction and LFTs have been normal. Suspect a viral etiology causing pancreatitis as other family members were also recently ill.  No recent weight loss and malignancy seems less likely.    Patient Active Problem List   Diagnosis     Type 2 Diabetes Mellitus     Hyperlipidemia     Obesity     Anxiety     Cervical Disc Degeneration     Lumbar Disc Degeneration     Serum Lead Level     Vitamin D Deficiency     Iron Deficiency Anemia Secondary To Inadequate Dietary Iron Intake     Alcohol dependence in remission (H)     Allergic rhinitis due to pollen     Anemia     Borderline glaucoma with ocular hypertension     Cervical spondylosis without myelopathy     Lumbosacral spondylosis without myelopathy     Monocular exotropia     Strabismic amblyopia     Hyperlipidemia with target LDL less than 100     Type 2 diabetes mellitus (H)     Pancreatitis        Plan:    1. ADAT to low fat.  2.  Pantoprazole was started due to reports of black stools at home (none charted here).  3.  Recommend she avoid NSAIDs.  4.  Recommend a repeat CT with pancreas protocol in about 6 weeks to confirm resolution and rule out underlying malignancy.  5.  OK to discharge home from a GI standpoint if eating and clinically  improving.    Treasure Rubio PA-C  [facility]  [phone]

## 2021-08-27 NOTE — PROGRESS NOTES
"Spiritual Assessment:     Significant back pain last night but in good spirits today    Slowly advancing her diet; understands the need to go slowly    Family has changed work schedules to provide care for Jo-Ann at home    Looking for opportunities to care for others while she is recovering    Care Provided:   Empathic listening and presence  Discussed coping strategies with pain  Facilitated reflective conversation around gratitude during suffering    Full Spiritual Care Note: Follow-up visit with Jo-Ann. She was accompanied by her daughter Laura. Jo-Ann shares that she her \"back went out\" last night and that created a fair amount of pain for her until about 1:30 am. She states that she slept on her mat on the floor, as this is what seems to help her when this happens at home. Laura notes that her mom has had so much pain lately that Laura has changed her schedule at work so she can be home with Jo-Ann during the day, while Jo-Ann's  is at work.     Jo-Ann is still on a liquid diet today. She would like to eat her normal diet (protein bar with nuts, apples), but understands that her body may not be ready for it yet. We talked briefly how to live/cope with suffering and trying to balance that with keyonna and gratitude. Jo-Ann continues to look for opportunities to care for others while she is here in the hospital. While we were talking, a dietician arrived to speak with Jo-Ann. I left them to talk.     Plan of Care: Will remain available for further support as patient/family needs/desires.    Aline Hayes M.Div.  Staff   [phone]    "

## 2021-08-27 NOTE — PLAN OF CARE
Problem: Adult Inpatient Plan of Care  Goal: Optimal Comfort and Wellbeing  Outcome: No Change  Sleeping on floor on mat. Reporting pain but declines medication.  Up walking in hallways. Pleasant but anxious.

## 2021-08-27 NOTE — PLAN OF CARE
Problem: Anxiety  Goal: Anxiety Reduction or Resolution  Outcome: Declining     Problem: Pain (Pancreatitis)  Goal: Acceptable Pain Control  Outcome: Improving  Intervention: Monitor and Manage Pain  Recent Flowsheet Documentation  Taken 8/26/2021 1701 by Zeynep Grimaldo RN  Pain Management Interventions: declines     B/P: 143/65, T: 98.5, P: 76, R: 14     Pt rated abdominal pain 2/10 whole shift and denied nausea.     Tolerated clear liquid diet.    Pt C/O sudden back pain rated 10/10.  She stated that it happens now and again.  After paging House Officer writer administered prn IV Toradol.  Pt stated that the IV pain medication only took the edge off.    She requested to lie down on the floor with a mat that was brought from her home.  It was agreed that if the mat didn't work staff would page the Hospitalist on-call and she wanted to request to go home.      Pt very anxious the whole shift.  Offered lots of reassurance and active listening.

## 2021-08-27 NOTE — CONSULTS
CLINICAL NUTRITION SERVICES - EDUCATION NOTE    Received diet education consult for following a low fat diet for next 1 month     NUTRITION HISTORY:  Information obtained from patient and chart:    Diet:  Patient follows a very strict diet dt multiple food allergies and intolerances.:  - Patient cannot tolerate Gluten except for specific sesame seed bun that needs to be toasted.   - Patient cannot tolerate dairy except for baby swiss cheese  - Patient is allergic to strawberries  - Patient cannot tolerate red meat, conventional eggs, bananas  ?  Living situation:   Lives with significant other  ?  Grocery shopping:  Self and significant other  ?  Meal preparation:  Self and significant other  ?  Breakfast:  Can only have eggs 3x per week as long as they are organic and free range  ?  Lunch:  Healthy Ones [place] or Chicken on sesame seed bun with butter and baby swiss cheese  ?  Dinner:   Healthy Ones [place] or Chicken on sesame seed bun with butter and baby swiss cheese    Snacks/Supplements:  Patient has a lot of fruit: apples, pears, watermelon, kiwi, berries (blue and black), raspberries. Patient also has salads with raw carrots and celery. Very dark chocolate.    Fluids:  96oz water daily, 1 can soda daily, sparkling water, 1 cup almond milk to take meds  ?  Previous diet instructions:  Patient was hoping to get diabetes education with a dietitian as outpatient but it was too expensive    ?  NUTRITION DIAGNOSIS:  Food- and nutrition-related knowledge deficit related to pancreatitis and DM as evidenced by eating high fat foods and drinking regular soda with no prior instruction on carbohydrate counting      INTERVENTIONS:  Provided instruction on Low Fat and Consistent Carb Diet    Provided  the following handouts: Low Fat Nutrition Therapy, Pancreatitis Nutrition Therapy, Diabetes meal plate     Goals:  Patient verbalizes understanding of diet by adhering to diet recommendations     Follow Up:  Patient to ask  any further nutrition-related questions before discharge. In addition, pt may request outpatient RD appointment.      Please place consult if further nutrition concerns arise

## 2021-08-27 NOTE — PROGRESS NOTES
"[facility] Hospitalist Progress Note  [facility]  Summary:    61F with insulin dependent diabetes, lumbar disc disease, osteoarthritis, 47 medication allergies presents for evaluation of abdominal pain found to have acute pancreatitis.  RUQ US with mild biliary ductal dilation and possible stones vs. Gallbladder polyp.  Subsequent MRCP without biliary obstruction and normal pancreatic duct anatomy.  Pain improving significantly with supportive care.       Assessment/Plan    #Acute pancreatitis - suspected viral in etiology by GI given other family members affected vs. Less likely gallstone.  GI recommending surgical evaluation if repeat bout of pancreatitis.    -pain treated with toradol alone, no opiates needed.  -symptomatically much improved.  Tolerated crackers and minimal TTP.  Advance diet to full liquid, low fat.    #possible GB polyp - not seen on MRCP.  Consider repeat US in 6-12 months    #DM, insulin dependent -  Home regimen NPH 20BID  Inpatient NPH 8 BID (increase with advancing diet), SSI       Checklist:  Code Status: Full Code    Diet: Combination Diet Full Liquid; No Fat Diet    Stearns Catheter: Not present  Central Lines: None  DVT px:  Ambulate every shift      Expected discharge: later today vs. Tomorrow    Overnight Events/Subjective/Notable results:    Pain much improved.  Tolerating liquids and had a cracker.  Pain treated with toradol alone  Ambulating multiple times daily  \"through out\" back last night and slept on the floor  4 point ROS otherwise negative    Objective        Vital signs in last 24 hours  Temp:  [98.2  F (36.8  C)-98.9  F (37.2  C)] 98.2  F (36.8  C)  Pulse:  [73-81] 81  Resp:  [14-19] 19  BP: (135-154)/(63-78) 135/63  SpO2:  [93 %-98 %] 98 % O2 Device: None (Room air)    Weight:   210 lbs 5 oz    Intake/Output last 3 shifts  I/O last 3 completed shifts:  In: 840 [P.O.:840]  Out: 1050 [Urine:1050]  Body mass index is 35 kg/m .    Physical Exam  General:  Alert, " cooperative, no distress,  Appears stated age.  Able to get to seated position without significant pain  Neurologic:  oriented, facial symmetry preserved, fluent speech.   Psych: calm, mood and affect appropriate to situation  HEENT:  Anicteric, MMM, unremarkable dentition  CV: RRR no MRG, normal S1 and S2, no edema  Lungs: CTAB.  Easyrespirations  Abd: soft, minimal epigastric tenderness, normoactive BS  Skin: no rashes noted on exposed skin. Color and turgor normal  Central Lines and Tubes: None (no adams, CVC, feeding tubes)      I have reviewed all labs, medications, imaging studies in the last 24 hours.  Pertinent findings&changes discussed above.    Data     Sina Roach MD  Internal Medicine Hospitalist  8/27/2021  10:32 AM

## 2021-08-27 NOTE — PLAN OF CARE
Problem: Adult Inpatient Plan of Care  Goal: Plan of Care Review  Outcome: Improving  Flowsheets (Taken 8/27/2021 1407)  Plan of Care Reviewed With: patient  Progress: improving     Problem: Adult Inpatient Plan of Care  Goal: Patient-Specific Goal (Individualized)  Outcome: Improving  Patient is experiencing a great deal of anxiety and requires lengthier conversations to explain complex needs.  Patient will potentially discharge today, and we are waiting for the physician rounding to make the final decision.

## 2021-08-28 ENCOUNTER — APPOINTMENT (OUTPATIENT)
Dept: ULTRASOUND IMAGING | Facility: HOSPITAL | Age: 61
DRG: 440 | End: 2021-08-28
Attending: HOSPITALIST
Payer: COMMERCIAL

## 2021-08-28 LAB
ALBUMIN SERPL-MCNC: 3.1 G/DL (ref 3.5–5)
ALP SERPL-CCNC: 72 U/L (ref 45–120)
ALT SERPL W P-5'-P-CCNC: 107 U/L (ref 0–45)
ANION GAP SERPL CALCULATED.3IONS-SCNC: 8 MMOL/L (ref 5–18)
AST SERPL W P-5'-P-CCNC: 105 U/L (ref 0–40)
BILIRUB SERPL-MCNC: 2.8 MG/DL (ref 0–1)
BUN SERPL-MCNC: 7 MG/DL (ref 8–22)
CALCIUM SERPL-MCNC: 9.2 MG/DL (ref 8.5–10.5)
CHLORIDE BLD-SCNC: 103 MMOL/L (ref 98–107)
CO2 SERPL-SCNC: 27 MMOL/L (ref 22–31)
CREAT SERPL-MCNC: 0.87 MG/DL (ref 0.6–1.1)
ERYTHROCYTE [DISTWIDTH] IN BLOOD BY AUTOMATED COUNT: 13.2 % (ref 10–15)
GFR SERPL CREATININE-BSD FRML MDRD: 72 ML/MIN/1.73M2
GLUCOSE BLD-MCNC: 223 MG/DL (ref 70–125)
GLUCOSE BLDC GLUCOMTR-MCNC: 158 MG/DL (ref 70–125)
GLUCOSE BLDC GLUCOMTR-MCNC: 176 MG/DL (ref 70–125)
GLUCOSE BLDC GLUCOMTR-MCNC: 186 MG/DL (ref 70–125)
GLUCOSE BLDC GLUCOMTR-MCNC: 203 MG/DL (ref 70–125)
GLUCOSE BLDC GLUCOMTR-MCNC: 208 MG/DL (ref 70–125)
GLUCOSE BLDC GLUCOMTR-MCNC: 224 MG/DL (ref 70–125)
HCT VFR BLD AUTO: 31.4 % (ref 35–47)
HGB BLD-MCNC: 10.1 G/DL (ref 11.7–15.7)
HOLD SPECIMEN: NORMAL
LIPASE SERPL-CCNC: 1678 U/L (ref 0–52)
MCH RBC QN AUTO: 28 PG (ref 26.5–33)
MCHC RBC AUTO-ENTMCNC: 32.2 G/DL (ref 31.5–36.5)
MCV RBC AUTO: 87 FL (ref 78–100)
PLATELET # BLD AUTO: 265 10E3/UL (ref 150–450)
POTASSIUM BLD-SCNC: 3.8 MMOL/L (ref 3.5–5)
PROT SERPL-MCNC: 7.6 G/DL (ref 6–8)
RBC # BLD AUTO: 3.61 10E6/UL (ref 3.8–5.2)
SODIUM SERPL-SCNC: 138 MMOL/L (ref 136–145)
WBC # BLD AUTO: 11.6 10E3/UL (ref 4–11)

## 2021-08-28 PROCEDURE — 83690 ASSAY OF LIPASE: CPT | Performed by: HOSPITALIST

## 2021-08-28 PROCEDURE — 76705 ECHO EXAM OF ABDOMEN: CPT

## 2021-08-28 PROCEDURE — 36415 COLL VENOUS BLD VENIPUNCTURE: CPT | Performed by: HOSPITALIST

## 2021-08-28 PROCEDURE — 99233 SBSQ HOSP IP/OBS HIGH 50: CPT | Performed by: HOSPITALIST

## 2021-08-28 PROCEDURE — 85027 COMPLETE CBC AUTOMATED: CPT | Performed by: HOSPITALIST

## 2021-08-28 PROCEDURE — 258N000003 HC RX IP 258 OP 636: Performed by: HOSPITALIST

## 2021-08-28 PROCEDURE — 120N000001 HC R&B MED SURG/OB

## 2021-08-28 PROCEDURE — 84155 ASSAY OF PROTEIN SERUM: CPT | Performed by: HOSPITALIST

## 2021-08-28 RX ORDER — SODIUM CHLORIDE, SODIUM LACTATE, POTASSIUM CHLORIDE, CALCIUM CHLORIDE 600; 310; 30; 20 MG/100ML; MG/100ML; MG/100ML; MG/100ML
INJECTION, SOLUTION INTRAVENOUS CONTINUOUS
Status: DISCONTINUED | OUTPATIENT
Start: 2021-08-28 | End: 2021-08-30

## 2021-08-28 RX ADMIN — SODIUM CHLORIDE, POTASSIUM CHLORIDE, SODIUM LACTATE AND CALCIUM CHLORIDE: 600; 310; 30; 20 INJECTION, SOLUTION INTRAVENOUS at 17:55

## 2021-08-28 RX ADMIN — SODIUM CHLORIDE, POTASSIUM CHLORIDE, SODIUM LACTATE AND CALCIUM CHLORIDE 125 ML/HR: 600; 310; 30; 20 INJECTION, SOLUTION INTRAVENOUS at 09:50

## 2021-08-28 NOTE — PLAN OF CARE
Problem: Pain (Pancreatitis)  Goal: Acceptable Pain Control  Outcome: Improving   Denies pain.   Problem: Nausea and Vomiting  Goal: Fluid and Electrolyte Balance  Outcome: Improving   No n/v noted.  Problem: Anxiety  Goal: Anxiety Reduction or Resolution  Outcome: Improving   Anxious about low grade temp at beginning of shift. Recheck was 98. Pt thought she had COVID because of general malaise and temp. Attempted to reassure. Somewhat helpful.

## 2021-08-28 NOTE — PROGRESS NOTES
[facility] Hospitalist Progress Note  [facility]  Summary:    61F with insulin dependent diabetes, lumbar disc disease, osteoarthritis, 47 medication allergies presents for evaluation of abdominal pain found to have acute pancreatitis.  RUQ US with mild biliary ductal dilation and possible stones vs. Gallbladder polyp.  Subsequent MRCP without biliary obstruction and normal pancreatic duct anatomy.  Pain improving significantly with supportive care.       Assessment/Plan    #Acute pancreatitis - initially suspected viral in etiology by GI given other family members affected.  Lipase slow to improve and T.bili today to 2.8 along with mild elevation in transaminases.  Episodic back pain may be related.  RUQ US with mild biliary dilation and biliary sludge.      -appreciate GI input, ?EUS with elevated T.bili and mild CBD dilation.  Keep NPO for now and start IVF  -trend LFTs    #possible GB polyp - not seen on MRCP.  Consider repeat US in 6-12 months    #DM, insulin dependent -  Home regimen NPH 20BID  Inpatient NPH 12 BID, SSI       Checklist:  Code Status: Full Code    Diet: NPO for Medical/Clinical Reasons Except for: Meds    Stearns Catheter: Not present  Central Lines: None  DVT px:  Ambulate every shift      Expected discharge: later today vs. Tomorrow    Overnight Events/Subjective/Notable results:  Overall felling much better. Had a few crackers and juice.  Noted orange urine overnight.  Some back pain and emesis again last night after OI.  Has been attributing the back pain to the bed.  T.bili up today along with mild elevation in LFTs, US obtained  4 point ROS otherwise negative    Objective      Vital signs in last 24 hours  Temp:  [98.2  F (36.8  C)-99.6  F (37.6  C)] 98.2  F (36.8  C)  Pulse:  [70-74] 70  Resp:  [18-20] 20  BP: (146-154)/(68-77) 146/68  SpO2:  [95 %-96 %] 96 % O2 Device: None (Room air)    Weight:   207 lbs 8 oz    Intake/Output last 3 shifts  I/O last 3 completed shifts:  In:  800 [P.O.:800]  Out: 1100 [Urine:1100]  Body mass index is 34.53 kg/m .    Physical Exam  General:  Alert, cooperative, no distress,  Appears stated age.  No pain  Neurologic:  oriented, facial symmetry preserved, fluent speech.   Psych: calm, mood and affect appropriate to situation  HEENT:  Anicteric, MMM, unremarkable dentition  CV: RRR no MRG, normal S1 and S2, no edema  Lungs: CTAB.  Easyrespirations  Abd: soft, no significant epigastric tenderness, normoactive BS  Skin: no rashes noted on exposed skin. Color and turgor normal  Central Lines and Tubes: None (no adams, CVC, feeding tubes)      I have reviewed all labs, medications, imaging studies in the last 24 hours.  Pertinent findings&changes discussed above.    Data     Sina Roach MD  Internal Medicine Hospitalist  8/27/2021  9:29 AM

## 2021-08-28 NOTE — PROGRESS NOTES
"GI PROGRESS NOTE  8/28/2021  Jo-Ann Jeffery  1960  /-69    Subjective:  Her abdominal pain is improving and she feels less bloated.  Still having intermittent back pain.  Her urine is dark.  Patient and daughter and  have many questions.  She did have emesis last evening x 2 but otherwise has tolerated liquids and crackers.     Objective:    Patient Vitals for the past 24 hrs:   BP Temp Temp src Pulse Resp SpO2 Height Weight   08/28/21 0753 (!) 146/68 98.2  F (36.8  C) Oral 70 20 96 % -- --   08/28/21 0015 (!) 154/77 99.1  F (37.3  C) Oral 74 18 95 % -- --   08/27/21 1540 (!) 152/70 99.6  F (37.6  C) Oral 72 18 95 % -- --   08/27/21 1236 -- -- -- -- -- -- 1.651 m (5' 5\") 94.1 kg (207 lb 8 oz)     Body mass index is 34.53 kg/m .  Gen: NAD  GI: Non-distended, BS positive, soft, mildly tender upper abdomen, epigastrium > RUQ    Laboratory  Recent Labs   Lab Test 08/28/21  0651 08/27/21  1005 08/26/21  0600   WBC 11.6* 11.6* 9.7   HGB 10.1* 9.2* 10.8*   MCV 87 86 87    224 248     Recent Labs   Lab Test 08/28/21  0919 08/28/21  0752 08/28/21  0621 08/27/21  1005 08/26/21  0600   NA  --   --  138 137 141   POTASSIUM  --   --  3.8 3.7 4.0   CHLORIDE  --   --  103 104 107   CO2  --   --  27 23 23   BUN  --   --  7* 8 8   CR  --   --  0.87 0.79 0.77   ANIONGAP  --   --  8 10 11   LAKEISHA  --   --  9.2 8.7 9.3   * 224* 223* 230* 155*     Recent Labs   Lab Test 08/28/21  0621 08/27/21  1005 08/26/21  0600 08/25/21  0857   ALBUMIN 3.1*  --  3.1* 3.3*   BILITOTAL 2.8*  --  0.7 0.7   *  --  16 18   *  --  18 19   ALKPHOS 72  --  67 72   LIPASE 1,678* 3,121*  --  5,004*     8/28/2021 limited abdominal ultrasound  FINDINGS:     GALLBLADDER: Sludge and/or sand-like cholelithiasis present. No significant wall thickening. Negative ultrasonic Donovan's sign.     BILE DUCTS: Mild biliary dilatation. Common bile duct measures mildly enlarged at 13-14 mm.     LIVER: Slightly echogenic liver " with smooth contour. No focal mass.     RIGHT KIDNEY: No hydronephrosis.     PANCREAS: Pancreatitis changes better seen on cross-sectional imaging.     No ascites.                                                                      IMPRESSION:     1.  Mild gallbladder sludge and/or sand-like cholelithiasis. No evidence for acute cholecystitis.     2.  Grossly stable mild biliary dilatation. Please see recent MRCP.     3.  Pancreatitis changes better seen on cross-sectional imaging.     4.  Mild hepatic steatosis.    8/25/2021 MRCP                                                    IMPRESSION:  1.  Parenchymal edema throughout the pancreas and within the adjacent peripancreatic and retroperitoneal fat with uncomplicated acute pancreatitis.  2.  Mild intra and extrahepatic bile duct dilatation that tapers smoothly to the ampulla with no stone, stricture or mass.  3.  Trace amount of stone material and minimal dilatation of the otherwise normal-appearing gallbladder.  4.  Mild diffuse hepatic steatosis.     8/25/2021 limited abdominal ultrasound  FINDINGS:     GALLBLADDER: Within the gallbladder neck there is an 8 mm nonshadowing solid structure image 66. No wall thickening or edema. Negative sonographic Donovan's sign.     BILE DUCTS: No intrahepatic biliary dilatation. The common duct measures 8-10 mm.     LIVER: Slightly coarsened echotexture. Normal size and surface contour. No focal mass.     RIGHT KIDNEY: No hydronephrosis.     PANCREAS: Borderline dilated main pancreatic duct measuring 3 to 4 mm. No peripancreatic fluid collection.     No ascites.                                                                      IMPRESSION:  1.  Mild extrahepatic biliary ductal dilatation and borderline dilatation of the main pancreatic duct. No obstructing stone is identified on this exam. Further evaluation with MRCP may be helpful.  2.  8 mm non shadowing nodular structure in the gallbladder neck, more likely a gallbladder  polyp rather than gallstone. Recommend 6-12 month ultrasound follow-up. No evidence of acute cholecystitis.     8/25/2021 CT abdomen/pelvis without contrast  FINDINGS:   LOWER CHEST: Normal.     HEPATOBILIARY: There are a few tiny stones visible within the gallbladder. No biliary dilatation.     PANCREAS: There is fat stranding present about the pancreas.     SPLEEN: Normal.     ADRENAL GLANDS: Normal.     KIDNEYS/BLADDER: Normal.     BOWEL: No bowel obstruction or abnormal bowel wall thickening. The appendix is normal.     LYMPH NODES: Normal.     VASCULATURE: Unremarkable.     PELVIC ORGANS: Normal.     MUSCULOSKELETAL: Normal.                                                                      IMPRESSION:   1.  Acute pancreatitis.  2.  Cholelithiasis.    Assessment:  Acute pancreatitis, initially thought to be from viral etiology as other family members were ill recently.  Patient denies use of alcohol and triglycerides were normal.  LFTs initially normal for many days and now mildly elevated with total bilirubin 2.8.  Transaminases also mildly elevated.  Repeat ultrasound shows slight progression of biliary dilation, now 13 to 14 mm.  This could be from biliary stone or sludge which can sometimes pass on its own.  No sign of cholangitis and white blood count is stable.  No urgent indication for ERCP at this time but we will continue to monitor.  EUS not as easily available on the weekend.    Patient Active Problem List   Diagnosis     Diabetes mellitus, type 2 (H)     Hyperlipidemia     Obesity     Anxiety     Cervical Disc Degeneration     Lumbar Disc Degeneration     Serum Lead Level     Vitamin D Deficiency     Iron Deficiency Anemia Secondary To Inadequate Dietary Iron Intake     Alcohol dependence in remission (H)     Allergic rhinitis due to pollen     Anemia     Borderline glaucoma with ocular hypertension     Cervical spondylosis without myelopathy     Lumbosacral spondylosis without myelopathy      Monocular exotropia     Strabismic amblyopia     Hyperlipidemia with target LDL less than 100     Type 2 diabetes mellitus (H)     Pancreatitis        Plan:  1.  Clear liquid diet today.  2.  Repeat LFTs tomorrow.  3.  GI following.  We will consider biliary intervention if clinically indicated.  4.  Consider eventual surgery consult for cholecystectomy discussion.    Treasure Rubio PA-C  [facility]  [phone]

## 2021-08-28 NOTE — PLAN OF CARE
Problem: Adult Inpatient Plan of Care  Goal: Readiness for Transition of Care  Outcome: Improving   Pt is expecting to discharge home today.    Problem: Pain (Pancreatitis)  Goal: Acceptable Pain Control  Outcome: Improving  Intervention: Monitor and Manage Pain  Recent Flowsheet Documentation  Taken 8/28/2021 0000 by Zainab العلي RN  Pain Management Interventions: declines   Pt stated that her pain was tolerable and that she didn't want any of her PRN pain meds.     Problem: Nausea and Vomiting  Goal: Fluid and Electrolyte Balance  Outcome: Improving   Pt reported that she vomited twice overnight but she didn't want any zofran. She said that she felt better after vomiting.     Pt's vital signs were stable. She is independent in her room. Her pain has been more in her lower back.

## 2021-08-29 LAB
ALBUMIN SERPL-MCNC: 2.9 G/DL (ref 3.5–5)
ALP SERPL-CCNC: 82 U/L (ref 45–120)
ALT SERPL W P-5'-P-CCNC: 145 U/L (ref 0–45)
ANION GAP SERPL CALCULATED.3IONS-SCNC: 10 MMOL/L (ref 5–18)
AST SERPL W P-5'-P-CCNC: 115 U/L (ref 0–40)
BASOPHILS # BLD AUTO: 0 10E3/UL (ref 0–0.2)
BASOPHILS NFR BLD AUTO: 0 %
BILIRUB DIRECT SERPL-MCNC: 1.8 MG/DL
BILIRUB SERPL-MCNC: 3.2 MG/DL (ref 0–1)
BUN SERPL-MCNC: 6 MG/DL (ref 8–22)
CALCIUM SERPL-MCNC: 9.4 MG/DL (ref 8.5–10.5)
CHLORIDE BLD-SCNC: 107 MMOL/L (ref 98–107)
CO2 SERPL-SCNC: 24 MMOL/L (ref 22–31)
CREAT SERPL-MCNC: 0.72 MG/DL (ref 0.6–1.1)
EOSINOPHIL # BLD AUTO: 0.3 10E3/UL (ref 0–0.7)
EOSINOPHIL NFR BLD AUTO: 3 %
ERYTHROCYTE [DISTWIDTH] IN BLOOD BY AUTOMATED COUNT: 13.3 % (ref 10–15)
GFR SERPL CREATININE-BSD FRML MDRD: >90 ML/MIN/1.73M2
GLUCOSE BLD-MCNC: 162 MG/DL (ref 70–125)
GLUCOSE BLDC GLUCOMTR-MCNC: 131 MG/DL (ref 70–125)
GLUCOSE BLDC GLUCOMTR-MCNC: 143 MG/DL (ref 70–125)
GLUCOSE BLDC GLUCOMTR-MCNC: 145 MG/DL (ref 70–125)
GLUCOSE BLDC GLUCOMTR-MCNC: 160 MG/DL (ref 70–125)
GLUCOSE BLDC GLUCOMTR-MCNC: 178 MG/DL (ref 70–125)
GLUCOSE BLDC GLUCOMTR-MCNC: 182 MG/DL (ref 70–125)
HCT VFR BLD AUTO: 30.2 % (ref 35–47)
HGB BLD-MCNC: 9.7 G/DL (ref 11.7–15.7)
IMM GRANULOCYTES # BLD: 0 10E3/UL
IMM GRANULOCYTES NFR BLD: 0 %
LIPASE SERPL-CCNC: 847 U/L (ref 0–52)
LYMPHOCYTES # BLD AUTO: 1.9 10E3/UL (ref 0.8–5.3)
LYMPHOCYTES NFR BLD AUTO: 20 %
MCH RBC QN AUTO: 27.6 PG (ref 26.5–33)
MCHC RBC AUTO-ENTMCNC: 32.1 G/DL (ref 31.5–36.5)
MCV RBC AUTO: 86 FL (ref 78–100)
MONOCYTES # BLD AUTO: 0.7 10E3/UL (ref 0–1.3)
MONOCYTES NFR BLD AUTO: 7 %
NEUTROPHILS # BLD AUTO: 6.6 10E3/UL (ref 1.6–8.3)
NEUTROPHILS NFR BLD AUTO: 70 %
NRBC # BLD AUTO: 0 10E3/UL
NRBC BLD AUTO-RTO: 0 /100
PLATELET # BLD AUTO: 250 10E3/UL (ref 150–450)
POTASSIUM BLD-SCNC: 3.9 MMOL/L (ref 3.5–5)
PROT SERPL-MCNC: 7.3 G/DL (ref 6–8)
RBC # BLD AUTO: 3.51 10E6/UL (ref 3.8–5.2)
SODIUM SERPL-SCNC: 141 MMOL/L (ref 136–145)
WBC # BLD AUTO: 9.5 10E3/UL (ref 4–11)

## 2021-08-29 PROCEDURE — 258N000003 HC RX IP 258 OP 636: Performed by: HOSPITALIST

## 2021-08-29 PROCEDURE — 83690 ASSAY OF LIPASE: CPT | Performed by: PHYSICIAN ASSISTANT

## 2021-08-29 PROCEDURE — 99232 SBSQ HOSP IP/OBS MODERATE 35: CPT | Performed by: HOSPITALIST

## 2021-08-29 PROCEDURE — 82040 ASSAY OF SERUM ALBUMIN: CPT | Performed by: PHYSICIAN ASSISTANT

## 2021-08-29 PROCEDURE — 250N000013 HC RX MED GY IP 250 OP 250 PS 637: Performed by: INTERNAL MEDICINE

## 2021-08-29 PROCEDURE — 120N000001 HC R&B MED SURG/OB

## 2021-08-29 PROCEDURE — 36415 COLL VENOUS BLD VENIPUNCTURE: CPT | Performed by: PHYSICIAN ASSISTANT

## 2021-08-29 PROCEDURE — 80076 HEPATIC FUNCTION PANEL: CPT | Performed by: PHYSICIAN ASSISTANT

## 2021-08-29 PROCEDURE — 85025 COMPLETE CBC W/AUTO DIFF WBC: CPT | Performed by: PHYSICIAN ASSISTANT

## 2021-08-29 RX ADMIN — CALCIUM CARBONATE (ANTACID) CHEW TAB 500 MG 1000 MG: 500 CHEW TAB at 00:51

## 2021-08-29 RX ADMIN — SODIUM CHLORIDE, POTASSIUM CHLORIDE, SODIUM LACTATE AND CALCIUM CHLORIDE 125 ML/HR: 600; 310; 30; 20 INJECTION, SOLUTION INTRAVENOUS at 10:25

## 2021-08-29 RX ADMIN — MAGNESIUM OXIDE TAB 400 MG (241.3 MG ELEMENTAL MG) 400 MG: 400 (241.3 MG) TAB at 00:51

## 2021-08-29 RX ADMIN — SODIUM CHLORIDE, POTASSIUM CHLORIDE, SODIUM LACTATE AND CALCIUM CHLORIDE 125 ML/HR: 600; 310; 30; 20 INJECTION, SOLUTION INTRAVENOUS at 17:00

## 2021-08-29 RX ADMIN — SODIUM CHLORIDE, POTASSIUM CHLORIDE, SODIUM LACTATE AND CALCIUM CHLORIDE: 600; 310; 30; 20 INJECTION, SOLUTION INTRAVENOUS at 00:49

## 2021-08-29 RX ADMIN — MAGNESIUM OXIDE TAB 400 MG (241.3 MG ELEMENTAL MG) 400 MG: 400 (241.3 MG) TAB at 21:22

## 2021-08-29 RX ADMIN — CALCIUM CARBONATE (ANTACID) CHEW TAB 500 MG 1000 MG: 500 CHEW TAB at 21:22

## 2021-08-29 NOTE — PLAN OF CARE
Problem: Adult Inpatient Plan of Care  Goal: Optimal Comfort and Wellbeing  Outcome: Improving     Problem: Pain (Pancreatitis)  Goal: Acceptable Pain Control  Outcome: Improving     Problem: Nausea and Vomiting  Goal: Fluid and Electrolyte Balance  Outcome: Improving  Intervention: Prevent and Manage Nausea and Vomiting  Recent Flowsheet Documentation  Taken 8/29/2021 0750 by Raymon Mrorison RN  Oral Care: teeth brushed  Patient states she is no longer experiencing debilitating abdominal pain, does state that she has an occasional twinge of pain, and is rating her pain at a 1/10 at most times where she feels something but is not disturbing her.

## 2021-08-29 NOTE — PLAN OF CARE
Problem: Adult Inpatient Plan of Care  Goal: Optimal Comfort and Wellbeing  Outcome: Improving  Patient having intermittent abdominal cramping and loose stools, pain relieved with heat and repositioning.       Problem: Pain (Pancreatitis)  Goal: Acceptable Pain Control  Outcome: Improving  Intervention: Monitor and Manage Pain  Recent Flowsheet Documentation  Taken 8/28/2021 2101 by Salomón Yip, RN  Pain Management Interventions:   heat applied   repositioned     Problem: Nausea and Vomiting  Goal: Fluid and Electrolyte Balance  Outcome: Improving  Patient did c/o nausea at HS but declined any interventions other than ice chips. No vomiting noted this shift.    Salomón Yip, RN

## 2021-08-29 NOTE — PROGRESS NOTES
[facility] Hospitalist Progress Note  [facility]  Summary:    61F with insulin dependent diabetes, lumbar disc disease, osteoarthritis, 47 medication allergies presents for evaluation of abdominal pain found to have acute pancreatitis.  RUQ US with mild biliary ductal dilation and possible stones vs. Gallbladder polyp.  Subsequent MRCP without biliary obstruction and normal pancreatic duct anatomy.  Pain improving significantly with supportive care.       Assessment/Plan    #Acute pancreatitis - initially suspected viral in etiology by GI given other family members affected.   However lipase was slow to improve and T.bili and LFTS have been trending up and repeat RUQ US with mild biliary dilation and biliary sludge.    May need ERCP.     -appreciate GI input, clears today, trend LFTs, possible ERCP tomorrow.      #possible GB polyp - not seen on MRCP or repeat US.  Consider repeat US in 6-12 months    #DM, insulin dependent -  Home regimen NPH 20BID  Inpatient NPH 12 BID, SSI       Checklist:  Code Status: Full Code    Diet: NPO for Medical/Clinical Reasons Except for: Meds    Stearns Catheter: Not present  Central Lines: None  DVT px:  Ambulating frequently.  Declines SCD and lovenox.  Discussed risk of DVT    Expected discharge: later today vs. Tomorrow    Overnight Events/Subjective/Notable results:    Overall feeling much better.  No pain today.  Lipase trending down but T.bili 3.2 and AST&ALT continue to trend up.    Back pain rated 7/10, declined pain meds  4 point ROS otherwise negative    Objective      Vital signs in last 24 hours  Temp:  [97.9  F (36.6  C)-98.8  F (37.1  C)] 98.5  F (36.9  C)  Pulse:  [67-75] 67  Resp:  [19-20] 20  BP: (129-171)/(67-79) 149/70  SpO2:  [96 %-97 %] 96 % O2 Device: None (Room air)    Weight:   207 lbs 8 oz    Intake/Output last 3 shifts  I/O last 3 completed shifts:  In: 1244 [P.O.:240; I.V.:1004]  Out: -   Body mass index is 34.53 kg/m .    Physical Exam  General:   Alert, cooperative, no distress,  Appears stated age.  No pain  Neurologic:  oriented, facial symmetry preserved, fluent speech.   Psych: calm, mood and affect appropriate to situation  HEENT:  Anicteric, MMM, unremarkable dentition  CV: RRR no MRG, normal S1 and S2, no edema  Lungs: CTAB.  Easyrespirations  Abd: soft, no significant epigastric tenderness, normoactive BS  Skin: no rashes noted on exposed skin. Color and turgor normal  Central Lines and Tubes: None (no adams, CVC, feeding tubes)      I have reviewed all labs, medications, imaging studies in the last 24 hours.  Pertinent findings&changes discussed above.    Data     Sina Roach MD  Internal Medicine Hospitalist  8/27/2021  9:29 AM

## 2021-08-29 NOTE — PROGRESS NOTES
Care Management Follow Up    Length of Stay (days): 4    Expected Discharge Date: 08/31/2021     Concerns to be Addressed:     LFT's and Bili still elevated, continue clears and NPO at 12am, possible repeat ERCP on Monday, GI following  Patient plan of care discussed at interdisciplinary rounds: Yes    Anticipated Discharge Disposition:  home     Anticipated Discharge Services:    Anticipated Discharge DME:      Patient/family educated on Medicare website which has current facility and service quality ratings:    Education Provided on the Discharge Plan:    Patient/Family in Agreement with the Plan:      Referrals Placed by CM/SW:    Private pay costs discussed: Not applicable    Additional Information:  Pt. Should have no needs at discharge.      Soraya Wallace RN        -5.07

## 2021-08-29 NOTE — PLAN OF CARE
Problem: Pain (Pancreatitis)  Goal: Acceptable Pain Control  Outcome: Improving     Problem: Nausea and Vomiting  Goal: Fluid and Electrolyte Balance  Outcome: Improving  Intervention: Prevent and Manage Nausea and Vomiting     Pt c/o chronic back pain rated 7/10,  Rates lower abd pain 5/10, declines offer of tramadol or ice packs.  BP elevated, magnesium and calcium given for pain/sleep/anxiety with subsequent improvement in BP.  Mild nausea reported but improved compared to yesterday.  NPO for potential procedure today.

## 2021-08-29 NOTE — PROGRESS NOTES
GI PROGRESS NOTE  8/29/2021  Jo-Ann Jeffery  1960  /-16    Subjective:  Her abdominal and back pain have improved.  She did have increased abdominal pain last evening after drinking broth.  She has had multiple bowel movements.     Objective:    Patient Vitals for the past 24 hrs:   BP Temp Temp src Pulse Resp SpO2   08/29/21 0745 (!) 142/73 98.5  F (36.9  C) Oral 74 18 96 %   08/29/21 0148 (!) 149/70 -- -- -- -- --   08/29/21 0015 (!) 171/79 98.5  F (36.9  C) Oral 67 20 96 %   08/28/21 2015 (!) 168/72 98.6  F (37  C) Oral 68 19 96 %   08/28/21 1514 (!) 141/67 97.9  F (36.6  C) Oral 73 20 96 %   08/28/21 1510 (!) 153/71 -- -- -- -- --     Body mass index is 34.53 kg/m .  Gen: NAD  Cardio: RRR  GI: Non-distended, BS positive, soft, non-tender    Laboratory  Recent Labs   Lab Test 08/29/21  0701 08/28/21  0651 08/27/21  1005   WBC 9.5 11.6* 11.6*   HGB 9.7* 10.1* 9.2*   MCV 86 87 86    265 224     Recent Labs   Lab Test 08/29/21  1130 08/29/21  0753 08/29/21  0701 08/28/21  0621 08/27/21  1005   NA  --   --  141 138 137   POTASSIUM  --   --  3.9 3.8 3.7   CHLORIDE  --   --  107 103 104   CO2  --   --  24 27 23   BUN  --   --  6* 7* 8   CR  --   --  0.72 0.87 0.79   ANIONGAP  --   --  10 8 10   LAKEISHA  --   --  9.4 9.2 8.7   * 178* 162* 223* 230*     Recent Labs   Lab Test 08/29/21  0701 08/28/21  0621 08/27/21  1005 08/26/21  0600   ALBUMIN 2.9* 3.1*  --  3.1*   BILITOTAL 3.2* 2.8*  --  0.7   * 107*  --  16   * 105*  --  18   ALKPHOS 82 72  --  67   LIPASE 847* 1,678* 3,121*  --      8/28/2021 limited abdominal ultrasound  FINDINGS:     GALLBLADDER: Sludge and/or sand-like cholelithiasis present. No significant wall thickening. Negative ultrasonic Donovan's sign.     BILE DUCTS: Mild biliary dilatation. Common bile duct measures mildly enlarged at 13-14 mm.     LIVER: Slightly echogenic liver with smooth contour. No focal mass.     RIGHT KIDNEY: No hydronephrosis.     PANCREAS:  Pancreatitis changes better seen on cross-sectional imaging.     No ascites.                                                                      IMPRESSION:     1.  Mild gallbladder sludge and/or sand-like cholelithiasis. No evidence for acute cholecystitis.     2.  Grossly stable mild biliary dilatation. Please see recent MRCP.     3.  Pancreatitis changes better seen on cross-sectional imaging.     4.  Mild hepatic steatosis.     8/25/2021 MRCP                                                    IMPRESSION:  1.  Parenchymal edema throughout the pancreas and within the adjacent peripancreatic and retroperitoneal fat with uncomplicated acute pancreatitis.  2.  Mild intra and extrahepatic bile duct dilatation that tapers smoothly to the ampulla with no stone, stricture or mass.  3.  Trace amount of stone material and minimal dilatation of the otherwise normal-appearing gallbladder.  4.  Mild diffuse hepatic steatosis.     8/25/2021 limited abdominal ultrasound  FINDINGS:     GALLBLADDER: Within the gallbladder neck there is an 8 mm nonshadowing solid structure image 66. No wall thickening or edema. Negative sonographic Donovan's sign.     BILE DUCTS: No intrahepatic biliary dilatation. The common duct measures 8-10 mm.     LIVER: Slightly coarsened echotexture. Normal size and surface contour. No focal mass.     RIGHT KIDNEY: No hydronephrosis.     PANCREAS: Borderline dilated main pancreatic duct measuring 3 to 4 mm. No peripancreatic fluid collection.     No ascites.                                                                      IMPRESSION:  1.  Mild extrahepatic biliary ductal dilatation and borderline dilatation of the main pancreatic duct. No obstructing stone is identified on this exam. Further evaluation with MRCP may be helpful.  2.  8 mm non shadowing nodular structure in the gallbladder neck, more likely a gallbladder polyp rather than gallstone. Recommend 6-12 month ultrasound follow-up. No evidence  of acute cholecystitis.     8/25/2021 CT abdomen/pelvis without contrast  FINDINGS:   LOWER CHEST: Normal.     HEPATOBILIARY: There are a few tiny stones visible within the gallbladder. No biliary dilatation.     PANCREAS: There is fat stranding present about the pancreas.     SPLEEN: Normal.     ADRENAL GLANDS: Normal.     KIDNEYS/BLADDER: Normal.     BOWEL: No bowel obstruction or abnormal bowel wall thickening. The appendix is normal.     LYMPH NODES: Normal.     VASCULATURE: Unremarkable.     PELVIC ORGANS: Normal.     MUSCULOSKELETAL: Normal.                                                                      IMPRESSION:   1.  Acute pancreatitis.  2.  Cholelithiasis.     Assessment:  Acute pancreatitis, initially thought to be from viral etiology as other family members were ill recently.  Patient denies use of alcohol and triglycerides were normal.  LFTs initially normal for many days but now rising x 2 days with bili 3.2  Transaminases also mildly elevated.  Repeat ultrasound shows slight progression of biliary dilation, now 13 to 14 mm.  This could be from biliary stone or sludge which can sometimes pass on its own.  No sign of cholangitis and white blood count is stable.  No urgent indication for ERCP at this time but we will continue to monitor.  Clinically she looks really good with completely resolved pain.    Patient Active Problem List   Diagnosis     Diabetes mellitus, type 2 (H)     Hyperlipidemia     Obesity     Anxiety     Cervical Disc Degeneration     Lumbar Disc Degeneration     Serum Lead Level     Vitamin D Deficiency     Iron Deficiency Anemia Secondary To Inadequate Dietary Iron Intake     Alcohol dependence in remission (H)     Allergic rhinitis due to pollen     Anemia     Borderline glaucoma with ocular hypertension     Cervical spondylosis without myelopathy     Lumbosacral spondylosis without myelopathy     Monocular exotropia     Strabismic amblyopia     Hyperlipidemia with target LDL  less than 100     Type 2 diabetes mellitus (H)     Pancreatitis        Plan:    1. Clear liquids again today, then NPO at midnight for possible ERCP Monday.  2.  Monitor labs.  3.  GI following.    Treasure Rubio PA-C  [facility]  [phone]

## 2021-08-30 ENCOUNTER — ANESTHESIA EVENT (OUTPATIENT)
Dept: SURGERY | Facility: HOSPITAL | Age: 61
DRG: 440 | End: 2021-08-30
Payer: COMMERCIAL

## 2021-08-30 ENCOUNTER — APPOINTMENT (OUTPATIENT)
Dept: RADIOLOGY | Facility: HOSPITAL | Age: 61
DRG: 440 | End: 2021-08-30
Attending: INTERNAL MEDICINE
Payer: COMMERCIAL

## 2021-08-30 ENCOUNTER — ANESTHESIA (OUTPATIENT)
Dept: SURGERY | Facility: HOSPITAL | Age: 61
DRG: 440 | End: 2021-08-30
Payer: COMMERCIAL

## 2021-08-30 PROBLEM — J98.01 BRONCHOSPASM: Status: ACTIVE | Noted: 2021-08-30

## 2021-08-30 LAB
ALBUMIN SERPL-MCNC: 2.6 G/DL (ref 3.5–5)
ALP SERPL-CCNC: 85 U/L (ref 45–120)
ALT SERPL W P-5'-P-CCNC: 146 U/L (ref 0–45)
ANION GAP SERPL CALCULATED.3IONS-SCNC: 8 MMOL/L (ref 5–18)
AST SERPL W P-5'-P-CCNC: 100 U/L (ref 0–40)
BASOPHILS # BLD AUTO: 0 10E3/UL (ref 0–0.2)
BASOPHILS NFR BLD AUTO: 0 %
BILIRUB SERPL-MCNC: 3.4 MG/DL (ref 0–1)
BUN SERPL-MCNC: 6 MG/DL (ref 8–22)
CALCIUM SERPL-MCNC: 9 MG/DL (ref 8.5–10.5)
CHLORIDE BLD-SCNC: 107 MMOL/L (ref 98–107)
CO2 SERPL-SCNC: 26 MMOL/L (ref 22–31)
CREAT SERPL-MCNC: 0.7 MG/DL (ref 0.6–1.1)
EOSINOPHIL # BLD AUTO: 0.3 10E3/UL (ref 0–0.7)
EOSINOPHIL NFR BLD AUTO: 4 %
ERCP: NORMAL
ERYTHROCYTE [DISTWIDTH] IN BLOOD BY AUTOMATED COUNT: 13.1 % (ref 10–15)
GFR SERPL CREATININE-BSD FRML MDRD: >90 ML/MIN/1.73M2
GLUCOSE BLD-MCNC: 165 MG/DL (ref 70–125)
GLUCOSE BLDC GLUCOMTR-MCNC: 135 MG/DL (ref 70–125)
GLUCOSE BLDC GLUCOMTR-MCNC: 135 MG/DL (ref 70–125)
GLUCOSE BLDC GLUCOMTR-MCNC: 153 MG/DL (ref 70–125)
GLUCOSE BLDC GLUCOMTR-MCNC: 155 MG/DL (ref 70–125)
GLUCOSE BLDC GLUCOMTR-MCNC: 159 MG/DL (ref 70–125)
GLUCOSE BLDC GLUCOMTR-MCNC: 258 MG/DL (ref 70–125)
GLUCOSE BLDC GLUCOMTR-MCNC: 85 MG/DL (ref 70–125)
GLUCOSE BLDC GLUCOMTR-MCNC: 95 MG/DL (ref 70–125)
HCT VFR BLD AUTO: 28.8 % (ref 35–47)
HGB BLD-MCNC: 9.2 G/DL (ref 11.7–15.7)
IMM GRANULOCYTES # BLD: 0 10E3/UL
IMM GRANULOCYTES NFR BLD: 0 %
LIPASE SERPL-CCNC: 690 U/L (ref 0–52)
LYMPHOCYTES # BLD AUTO: 2.1 10E3/UL (ref 0.8–5.3)
LYMPHOCYTES NFR BLD AUTO: 26 %
MCH RBC QN AUTO: 27.5 PG (ref 26.5–33)
MCHC RBC AUTO-ENTMCNC: 31.9 G/DL (ref 31.5–36.5)
MCV RBC AUTO: 86 FL (ref 78–100)
MONOCYTES # BLD AUTO: 0.6 10E3/UL (ref 0–1.3)
MONOCYTES NFR BLD AUTO: 7 %
NEUTROPHILS # BLD AUTO: 5.2 10E3/UL (ref 1.6–8.3)
NEUTROPHILS NFR BLD AUTO: 63 %
NRBC # BLD AUTO: 0 10E3/UL
NRBC BLD AUTO-RTO: 0 /100
PLATELET # BLD AUTO: 259 10E3/UL (ref 150–450)
POTASSIUM BLD-SCNC: 3.7 MMOL/L (ref 3.5–5)
PROT SERPL-MCNC: 6.9 G/DL (ref 6–8)
RBC # BLD AUTO: 3.34 10E6/UL (ref 3.8–5.2)
SODIUM SERPL-SCNC: 141 MMOL/L (ref 136–145)
WBC # BLD AUTO: 8.2 10E3/UL (ref 4–11)

## 2021-08-30 PROCEDURE — C1769 GUIDE WIRE: HCPCS | Performed by: INTERNAL MEDICINE

## 2021-08-30 PROCEDURE — 710N000009 HC RECOVERY PHASE 1, LEVEL 1, PER MIN: Performed by: INTERNAL MEDICINE

## 2021-08-30 PROCEDURE — 94640 AIRWAY INHALATION TREATMENT: CPT | Mod: 76

## 2021-08-30 PROCEDURE — C1726 CATH, BAL DIL, NON-VASCULAR: HCPCS | Performed by: INTERNAL MEDICINE

## 2021-08-30 PROCEDURE — 258N000003 HC RX IP 258 OP 636: Performed by: INTERNAL MEDICINE

## 2021-08-30 PROCEDURE — 85025 COMPLETE CBC W/AUTO DIFF WBC: CPT | Performed by: PHYSICIAN ASSISTANT

## 2021-08-30 PROCEDURE — C9113 INJ PANTOPRAZOLE SODIUM, VIA: HCPCS | Performed by: INTERNAL MEDICINE

## 2021-08-30 PROCEDURE — 360N000082 HC SURGERY LEVEL 2 W/ FLUORO, PER MIN: Performed by: INTERNAL MEDICINE

## 2021-08-30 PROCEDURE — 250N000011 HC RX IP 250 OP 636: Performed by: NURSE ANESTHETIST, CERTIFIED REGISTERED

## 2021-08-30 PROCEDURE — 120N000001 HC R&B MED SURG/OB

## 2021-08-30 PROCEDURE — 370N000017 HC ANESTHESIA TECHNICAL FEE, PER MIN: Performed by: INTERNAL MEDICINE

## 2021-08-30 PROCEDURE — 82040 ASSAY OF SERUM ALBUMIN: CPT | Performed by: HOSPITALIST

## 2021-08-30 PROCEDURE — 99232 SBSQ HOSP IP/OBS MODERATE 35: CPT | Performed by: INTERNAL MEDICINE

## 2021-08-30 PROCEDURE — 250N000009 HC RX 250: Performed by: NURSE ANESTHETIST, CERTIFIED REGISTERED

## 2021-08-30 PROCEDURE — 258N000001 HC RX 258: Performed by: HOSPITALIST

## 2021-08-30 PROCEDURE — 255N000002 HC RX 255 OP 636: Performed by: INTERNAL MEDICINE

## 2021-08-30 PROCEDURE — 36415 COLL VENOUS BLD VENIPUNCTURE: CPT | Performed by: PHYSICIAN ASSISTANT

## 2021-08-30 PROCEDURE — 0F798ZZ DILATION OF COMMON BILE DUCT, VIA NATURAL OR ARTIFICIAL OPENING ENDOSCOPIC: ICD-10-PCS | Performed by: INTERNAL MEDICINE

## 2021-08-30 PROCEDURE — 250N000009 HC RX 250: Performed by: INTERNAL MEDICINE

## 2021-08-30 PROCEDURE — 250N000012 HC RX MED GY IP 250 OP 636 PS 637: Performed by: INTERNAL MEDICINE

## 2021-08-30 PROCEDURE — 250N000025 HC SEVOFLURANE, PER MIN: Performed by: INTERNAL MEDICINE

## 2021-08-30 PROCEDURE — 94640 AIRWAY INHALATION TREATMENT: CPT

## 2021-08-30 PROCEDURE — 272N000001 HC OR GENERAL SUPPLY STERILE: Performed by: INTERNAL MEDICINE

## 2021-08-30 PROCEDURE — 250N000013 HC RX MED GY IP 250 OP 250 PS 637: Performed by: INTERNAL MEDICINE

## 2021-08-30 PROCEDURE — 258N000003 HC RX IP 258 OP 636: Performed by: HOSPITALIST

## 2021-08-30 PROCEDURE — 83690 ASSAY OF LIPASE: CPT | Performed by: PHYSICIAN ASSISTANT

## 2021-08-30 PROCEDURE — 74330 X-RAY BILE/PANC ENDOSCOPY: CPT

## 2021-08-30 PROCEDURE — 250N000009 HC RX 250

## 2021-08-30 PROCEDURE — 250N000011 HC RX IP 250 OP 636: Performed by: INTERNAL MEDICINE

## 2021-08-30 PROCEDURE — 999N000141 HC STATISTIC PRE-PROCEDURE NURSING ASSESSMENT: Performed by: INTERNAL MEDICINE

## 2021-08-30 RX ORDER — DEXTROSE MONOHYDRATE 25 G/50ML
25 INJECTION, SOLUTION INTRAVENOUS ONCE
Status: COMPLETED | OUTPATIENT
Start: 2021-08-30 | End: 2021-08-30

## 2021-08-30 RX ORDER — SODIUM CHLORIDE, SODIUM LACTATE, POTASSIUM CHLORIDE, CALCIUM CHLORIDE 600; 310; 30; 20 MG/100ML; MG/100ML; MG/100ML; MG/100ML
INJECTION, SOLUTION INTRAVENOUS CONTINUOUS
Status: DISCONTINUED | OUTPATIENT
Start: 2021-08-30 | End: 2021-08-30 | Stop reason: ALTCHOICE

## 2021-08-30 RX ORDER — HALOPERIDOL 5 MG/ML
1 INJECTION INTRAMUSCULAR
Status: DISCONTINUED | OUTPATIENT
Start: 2021-08-30 | End: 2021-08-30

## 2021-08-30 RX ORDER — ALBUTEROL SULFATE 5 MG/ML
2.5 SOLUTION RESPIRATORY (INHALATION) ONCE
Status: COMPLETED | OUTPATIENT
Start: 2021-08-30 | End: 2021-08-31

## 2021-08-30 RX ORDER — FENTANYL CITRATE 50 UG/ML
INJECTION, SOLUTION INTRAMUSCULAR; INTRAVENOUS PRN
Status: DISCONTINUED | OUTPATIENT
Start: 2021-08-30 | End: 2021-08-30

## 2021-08-30 RX ORDER — SODIUM CHLORIDE 9 MG/ML
INJECTION, SOLUTION INTRAVENOUS CONTINUOUS
Status: DISCONTINUED | OUTPATIENT
Start: 2021-08-30 | End: 2021-08-31 | Stop reason: HOSPADM

## 2021-08-30 RX ORDER — FENTANYL CITRATE 50 UG/ML
50 INJECTION, SOLUTION INTRAMUSCULAR; INTRAVENOUS EVERY 5 MIN PRN
Status: DISCONTINUED | OUTPATIENT
Start: 2021-08-30 | End: 2021-08-30

## 2021-08-30 RX ORDER — DEXAMETHASONE SODIUM PHOSPHATE 10 MG/ML
INJECTION, SOLUTION INTRAMUSCULAR; INTRAVENOUS PRN
Status: DISCONTINUED | OUTPATIENT
Start: 2021-08-30 | End: 2021-08-30

## 2021-08-30 RX ORDER — PROPOFOL 10 MG/ML
INJECTION, EMULSION INTRAVENOUS PRN
Status: DISCONTINUED | OUTPATIENT
Start: 2021-08-30 | End: 2021-08-30

## 2021-08-30 RX ORDER — ONDANSETRON 2 MG/ML
INJECTION INTRAMUSCULAR; INTRAVENOUS PRN
Status: DISCONTINUED | OUTPATIENT
Start: 2021-08-30 | End: 2021-08-30

## 2021-08-30 RX ORDER — ALBUTEROL SULFATE 5 MG/ML
2.5 SOLUTION RESPIRATORY (INHALATION) EVERY 4 HOURS PRN
Status: DISCONTINUED | OUTPATIENT
Start: 2021-08-30 | End: 2021-08-31 | Stop reason: HOSPADM

## 2021-08-30 RX ORDER — ALBUTEROL SULFATE 0.83 MG/ML
SOLUTION RESPIRATORY (INHALATION)
Status: COMPLETED
Start: 2021-08-30 | End: 2021-08-30

## 2021-08-30 RX ORDER — ONDANSETRON 4 MG/1
4 TABLET, ORALLY DISINTEGRATING ORAL EVERY 30 MIN PRN
Status: DISCONTINUED | OUTPATIENT
Start: 2021-08-30 | End: 2021-08-30

## 2021-08-30 RX ORDER — SODIUM CHLORIDE, SODIUM LACTATE, POTASSIUM CHLORIDE, CALCIUM CHLORIDE 600; 310; 30; 20 MG/100ML; MG/100ML; MG/100ML; MG/100ML
INJECTION, SOLUTION INTRAVENOUS CONTINUOUS
Status: DISCONTINUED | OUTPATIENT
Start: 2021-08-30 | End: 2021-08-30

## 2021-08-30 RX ORDER — INDOMETHACIN 50 MG/1
100 SUPPOSITORY RECTAL
Status: DISCONTINUED | OUTPATIENT
Start: 2021-08-30 | End: 2021-08-31 | Stop reason: HOSPADM

## 2021-08-30 RX ORDER — ONDANSETRON 2 MG/ML
4 INJECTION INTRAMUSCULAR; INTRAVENOUS EVERY 30 MIN PRN
Status: DISCONTINUED | OUTPATIENT
Start: 2021-08-30 | End: 2021-08-30

## 2021-08-30 RX ORDER — LIDOCAINE HYDROCHLORIDE 20 MG/ML
INJECTION, SOLUTION INFILTRATION; PERINEURAL PRN
Status: DISCONTINUED | OUTPATIENT
Start: 2021-08-30 | End: 2021-08-30

## 2021-08-30 RX ADMIN — CALCIUM CARBONATE (ANTACID) CHEW TAB 500 MG 1000 MG: 500 CHEW TAB at 21:28

## 2021-08-30 RX ADMIN — SUGAMMADEX 190 MG: 100 INJECTION, SOLUTION INTRAVENOUS at 13:12

## 2021-08-30 RX ADMIN — ALBUTEROL SULFATE 2.5 MG: 2.5 SOLUTION RESPIRATORY (INHALATION) at 09:10

## 2021-08-30 RX ADMIN — MIDAZOLAM HYDROCHLORIDE 2 MG: 1 INJECTION, SOLUTION INTRAMUSCULAR; INTRAVENOUS at 12:36

## 2021-08-30 RX ADMIN — PROPOFOL 10 MG: 10 INJECTION, EMULSION INTRAVENOUS at 12:44

## 2021-08-30 RX ADMIN — FENTANYL CITRATE 50 MCG: 50 INJECTION, SOLUTION INTRAMUSCULAR; INTRAVENOUS at 12:44

## 2021-08-30 RX ADMIN — SODIUM CHLORIDE, POTASSIUM CHLORIDE, SODIUM LACTATE AND CALCIUM CHLORIDE: 600; 310; 30; 20 INJECTION, SOLUTION INTRAVENOUS at 01:04

## 2021-08-30 RX ADMIN — MAGNESIUM OXIDE TAB 400 MG (241.3 MG ELEMENTAL MG) 400 MG: 400 (241.3 MG) TAB at 21:28

## 2021-08-30 RX ADMIN — SODIUM CHLORIDE, POTASSIUM CHLORIDE, SODIUM LACTATE AND CALCIUM CHLORIDE: 600; 310; 30; 20 INJECTION, SOLUTION INTRAVENOUS at 14:51

## 2021-08-30 RX ADMIN — DEXTROSE MONOHYDRATE 25 ML: 500 INJECTION PARENTERAL at 05:04

## 2021-08-30 RX ADMIN — LIDOCAINE HYDROCHLORIDE 20 MG: 20 INJECTION, SOLUTION INFILTRATION; PERINEURAL at 12:44

## 2021-08-30 RX ADMIN — INSULIN ASPART 1 UNITS: 100 INJECTION, SOLUTION INTRAVENOUS; SUBCUTANEOUS at 16:01

## 2021-08-30 RX ADMIN — PANTOPRAZOLE SODIUM 40 MG: 40 INJECTION, POWDER, FOR SOLUTION INTRAVENOUS at 09:16

## 2021-08-30 RX ADMIN — FENTANYL CITRATE 50 MCG: 50 INJECTION, SOLUTION INTRAMUSCULAR; INTRAVENOUS at 12:55

## 2021-08-30 RX ADMIN — ONDANSETRON 4 MG: 2 INJECTION INTRAMUSCULAR; INTRAVENOUS at 13:14

## 2021-08-30 RX ADMIN — ROCURONIUM BROMIDE 30 MG: 10 INJECTION, SOLUTION INTRAVENOUS at 12:44

## 2021-08-30 RX ADMIN — DEXAMETHASONE SODIUM PHOSPHATE 10 MG: 10 INJECTION, SOLUTION INTRAMUSCULAR; INTRAVENOUS at 12:49

## 2021-08-30 RX ADMIN — SODIUM CHLORIDE, POTASSIUM CHLORIDE, SODIUM LACTATE AND CALCIUM CHLORIDE: 600; 310; 30; 20 INJECTION, SOLUTION INTRAVENOUS at 09:15

## 2021-08-30 RX ADMIN — ALBUTEROL SULFATE 2.5 MG: 2.5 SOLUTION RESPIRATORY (INHALATION) at 15:18

## 2021-08-30 NOTE — ANESTHESIA PROCEDURE NOTES
Airway       Patient location during procedure: OR       Procedure Start/Stop Times: 8/30/2021 12:47 PM  Staff -        CRNA: Rosa Maria Chand APRN CRNA       Performed By: CRNAIndications and Patient Condition       Indications for airway management: shravan-procedural       Induction type:intravenous       Mask difficulty assessment: 1 - vent by mask    Final Airway Details       Final airway type: endotracheal airway       Successful airway: ETT - single  Endotracheal Airway Details        ETT size (mm): 7.0       Cuffed: yes       Successful intubation technique: direct laryngoscopy       DL Blade Type: Hayes 2       Grade View of Cords: 1       Adjucts: stylet and tooth guard       Position: Right       Measured from: lips       Secured at (cm): 22       Bite block used: None    Post intubation assessment        Placement verified by: capnometry, equal breath sounds and chest rise        Number of attempts at approach: 1       Number of other approaches attempted: 0       Secured with: silk tape       Ease of procedure: easy       Dentition: Intact and Unchanged

## 2021-08-30 NOTE — PLAN OF CARE
Problem: Adult Inpatient Plan of Care  Goal: Optimal Comfort and Wellbeing  Outcome: No Change   Pt NPO. Denies any pain or discomfort.  and 95. Pt c/o being sweaty, weak, and dizzy. BG checked was 85. Notified MD. One time D50 given. BG recheck was 159 and pt feels much better. Still having sinus pressure. Plan for ERCP today.     Caroline Marte RN

## 2021-08-30 NOTE — ANESTHESIA POSTPROCEDURE EVALUATION
Patient: Jo-Ann Jeffery    Procedure(s):  ENDOSCOPIC RETROGRADE CHOLANGIOPANCREATOGRAPHY WITH SPHINCTEROTOMY    Diagnosis:Gallstone pancreatitis [K85.10]  Diagnosis Additional Information: No value filed.    Anesthesia Type:  General    Note:  Disposition: Inpatient   Postop Pain Control: Uneventful            Sign Out: Well controlled pain   PONV: No   Neuro/Psych: Uneventful            Sign Out: Acceptable/Baseline neuro status   Airway/Respiratory: Uneventful            Sign Out: Acceptable/Baseline resp. status   CV/Hemodynamics: Uneventful            Sign Out: Acceptable CV status; No obvious hypovolemia; No obvious fluid overload   Other NRE: NONE   DID A NON-ROUTINE EVENT OCCUR? No           Last vitals:  Vitals Value Taken Time   /76 08/30/21 1430   Temp 36.8  C (98.3  F) 08/30/21 1415   Pulse 80 08/30/21 1436   Resp 48 08/30/21 1435   SpO2 94 % 08/30/21 1436   Vitals shown include unvalidated device data.    Electronically Signed By: Jamshid Barrow MD  August 30, 2021  4:09 PM

## 2021-08-30 NOTE — ANESTHESIA PREPROCEDURE EVALUATION
Anesthesia Pre-Procedure Evaluation    Patient: Jo-Ann Jeffery   MRN: [id number] : 1960        Preoperative Diagnosis: Gallstone pancreatitis [K85.10]   Procedure : Procedure(s):  ENDOSCOPIC RETROGRADE CHOLANGIOPANCREATOGRAPHY     History reviewed. No pertinent past medical history.   History reviewed. No pertinent surgical history.   Allergies   Allergen Reactions     Azithromycin Hives     Cephalexin Hives     Contrast Dye Shortness Of Breath     Erythromycin Hives     hives       Latex Hives     hands break out with latex gloves     Moxifloxacin Anaphylaxis     Penicillins Hives     nausea       Soap Hives     Tide soap, Soft soap, Hien Dish soap     Sulfamethoxazole-Trimethoprim Nausea     Amides      Aminoglycosides      Amitriptyline      Antipyrine-Benzocaine      Cefprozil Hives     Chocolate Flavor      Annotation: pudding       Ciprofloxacin Hives     Clindamycin Other (See Comments)     Severe diarrhea     Clonazepam      Hives       Dairy Products [Milk Protein Extract]      Tachycardia       Diphenhydramine Other (See Comments)     Racing heart     Fluoxetine      Hives       Glipizide Other (See Comments)     Muscle pain/muscle twitching     Guaifenesin & Derivatives      Hypertension     Hydrocodone-Acetaminophen      Iodides      swells up, burns      Iron      Lactulose Nausea and Vomiting     Loperamide      Macrolides      Hives       Memantine      Metformin Headache     Methylprednisolone Nausea and Swelling     Metoclopramide      Morphine      Neomycin      ear drops-ears swell up inside     Prochlorperazine      Pseudoephedrine-Guaifenesin Cr      Quinolones      Other reaction       Sodium Chloride Swelling     Strawberry (Diagnostic) Swelling     Sulfa Drugs      Tartrazine Difficulty breathing     Acetic Acid Rash     Antipyrine Rash     Food Palpitations     Chocolate and butterscottch, strawberries, walnuts     Imidazole Antifungals Rash and Unknown     Nuts Palpitations      Chocolate and butterscottch, strawberries, walnuts      Social History     Tobacco Use     Smoking status: Never Smoker     Smokeless tobacco: Never Used   Substance Use Topics     Alcohol use: Not on file      Wt Readings from Last 1 Encounters:   08/27/21 94.1 kg (207 lb 8 oz)        Anesthesia Evaluation   Pt has had prior anesthetic.         ROS/MED HX  ENT/Pulmonary:  - neg pulmonary ROS     Neurologic:  - neg neurologic ROS     Cardiovascular:  - neg cardiovascular ROS     METS/Exercise Tolerance:     Hematologic:  - neg hematologic  ROS     Musculoskeletal:  - neg musculoskeletal ROS     GI/Hepatic:  - neg GI/hepatic ROS     Renal/Genitourinary:  - neg Renal ROS     Endo:  - neg endo ROS   (+) type II DM, Obesity,     Psychiatric/Substance Use:  - neg psychiatric ROS     Infectious Disease:  - neg infectious disease ROS     Malignancy:  - neg malignancy ROS     Other:  - neg other ROS          Physical Exam    Airway  airway exam normal      Mallampati: II   TM distance: > 3 FB   Neck ROM: full   Mouth opening: > 3 cm    Respiratory Devices and Support         Dental       (+) missing      Cardiovascular   cardiovascular exam normal       Rhythm and rate: regular and normal     Pulmonary   pulmonary exam normal        breath sounds clear to auscultation           OUTSIDE LABS:  CBC:   Lab Results   Component Value Date    WBC 8.2 08/30/2021    WBC 9.5 08/29/2021    HGB 9.2 (L) 08/30/2021    HGB 9.7 (L) 08/29/2021    HCT 28.8 (L) 08/30/2021    HCT 30.2 (L) 08/29/2021     08/30/2021     08/29/2021     BMP:   Lab Results   Component Value Date     08/30/2021     08/29/2021    POTASSIUM 3.7 08/30/2021    POTASSIUM 3.9 08/29/2021    CHLORIDE 107 08/30/2021    CHLORIDE 107 08/29/2021    CO2 26 08/30/2021    CO2 24 08/29/2021    BUN 6 (L) 08/30/2021    BUN 6 (L) 08/29/2021    CR 0.70 08/30/2021    CR 0.72 08/29/2021     (H) 08/30/2021     (H) 08/30/2021     COAGS: No results  found for: PTT, INR, FIBR  POC: No results found for: BGM, HCG, HCGS  HEPATIC:   Lab Results   Component Value Date    ALBUMIN 2.6 (L) 08/30/2021    PROTTOTAL 6.9 08/30/2021     (H) 08/30/2021     (H) 08/30/2021    GGT 18 08/26/2021    ALKPHOS 85 08/30/2021    BILITOTAL 3.4 (H) 08/30/2021     OTHER:   Lab Results   Component Value Date    PH 7.39 08/26/2021    LACT 0.9 08/20/2020    A1C 8.9 (H) 08/26/2021    LAKEISHA 9.0 08/30/2021    MAG 1.8 08/25/2021    LIPASE 690 (H) 08/30/2021       Anesthesia Plan    ASA Status:  3   NPO Status:  NPO Appropriate    Anesthesia Type: General.     - Airway: ETT   Induction: Intravenous, Propofol.   Maintenance: Balanced.        Consents    Anesthesia Plan(s) and associated risks, benefits, and realistic alternatives discussed. Questions answered and patient/representative(s) expressed understanding.     - Discussed with:  Patient      - Extended Intubation/Ventilatory Support Discussed: Yes.      - Patient is DNR/DNI Status: No    Use of blood products discussed: Yes.     - Discussed with: Patient.     Postoperative Care    Pain management: Multi-modal analgesia.   PONV prophylaxis: Ondansetron (or other 5HT-3)     Comments:                Jamshid Barrow MD

## 2021-08-30 NOTE — PROVIDER NOTIFICATION
"Patient called CNA and reported, \"I know there is mold in this room. I suddenly feel wheezy and tight. I get like this when there is mold anywhere.\"  Dr. Light notified and came to bedside and updated. Oxygen sats 98%; Lungs diminished and expiratory wheezing. Nebs ordered and given and patient improved. No wheezing at this time. Moved pt to room 207.  "

## 2021-08-30 NOTE — PROGRESS NOTES
[facility]    PROGRESS NOTE - Hospitalist Service    Assessment and Plan    Active Problems:    Diabetes mellitus, type 2 (H)    Pancreatitis    Bronchospasm    Jo-Ann Jeffery is a 61 year old old female with h/o insulin dependent diabetes, lumbar disc disease, osteoarthritis, 47 medication allergies presents for evaluation of abdominal pain found to have acute pancreatitis.  RUQ US with mild biliary ductal dilation and possible stones vs. Gallbladder polyp.  Subsequent MRCP without biliary obstruction and normal pancreatic duct anatomy.  Pain improving significantly with supportive care.     Acute pancreatitis - initially suspected viral in etiology by GI given other family members affected.   However lipase was slow to improve and T.bili and LFTS have been trending up and repeat RUQ US with mild biliary dilation and biliary sludge.      - s/p ERCP today with sphincterotomy and sludge removal   - clears and ADAT   - possible surgical consult per note but defer to GI if needs to be in the hospital or as outpatient      #possible GB polyp - not seen on MRCP or repeat US.  Consider repeat US in 6-12 months      #DM, insulin dependent -  Home regimen NPH 20BID  Inpatient NPH 12 BID, SSI     Bronchospasms this morning likely from allergen  - resolved with alb neb, ordered PRN   - changed rooms     VTE prophylaxis:  Pneumatic Compression Devices  DIET: Orders Placed This Encounter      Clear Liquid Diet    Drains/Lines: none  Weight bearing status: WBAT  Disposition/Barriers to discharge: pending diet and GI recs   Code Status: Full Code    Subjective:  Patient was very wheezy this morning and is very sensitive to allergens and per patient and daughter mildew in the room caused her wheezing which improved after a neb     PHYSICAL EXAM  Vitals:    08/25/21 0820 08/25/21 1955 08/27/21 1236   Weight: 93.4 kg (206 lb) 95.4 kg (210 lb 5 oz) 94.1 kg (207 lb 8 oz)     B/P:151/76 T:98.3 P:77 R:16      Intake/Output Summary (Last 24 hours) at 8/30/2021 1454  Last data filed at 8/30/2021 1334  Gross per 24 hour   Intake 1320 ml   Output 600 ml   Net 720 ml      Body mass index is 34.53 kg/m .  Physical Exam  Constitutional:       Appearance: Normal appearance.   HENT:      Head: Normocephalic and atraumatic.      Nose: Nose normal.      Mouth/Throat:      Mouth: Mucous membranes are moist.   Eyes:      Extraocular Movements: Extraocular movements intact.   Cardiovascular:      Rate and Rhythm: Normal rate and regular rhythm.      Pulses: Normal pulses.      Heart sounds: Normal heart sounds.   Pulmonary:      Effort: Pulmonary effort is normal.      Breath sounds: Normal breath sounds.   Abdominal:      General: Abdomen is flat. Bowel sounds are normal.      Palpations: Abdomen is soft.   Musculoskeletal:         General: Normal range of motion.      Cervical back: Normal range of motion.   Skin:     General: Skin is warm.   Neurological:      General: No focal deficit present.      Mental Status: She is alert.           PERTINENT LABS/IMAGING:  Results for orders placed or performed during the hospital encounter of 08/25/21   CT Abdomen Pelvis w/o Contrast    Impression    IMPRESSION:   1.  Acute pancreatitis.  2.  Cholelithiasis.     Abdomen US, limited (RUQ only)    Impression    IMPRESSION:  1.  Mild extrahepatic biliary ductal dilatation and borderline dilatation of the main pancreatic duct. No obstructing stone is identified on this exam. Further evaluation with MRCP may be helpful.  2.  8 mm non shadowing nodular structure in the gallbladder neck, more likely a gallbladder polyp rather than gallstone. Recommend 6-12 month ultrasound follow-up. No evidence of acute cholecystitis.       MR Abdomen MRCP without Contrast    Impression    IMPRESSION:  1.  Parenchymal edema throughout the pancreas and within the adjacent peripancreatic and retroperitoneal fat with uncomplicated acute pancreatitis.  2.  Mild  intra and extrahepatic bile duct dilatation that tapers smoothly to the ampulla with no stone, stricture or mass.  3.  Trace amount of stone material and minimal dilatation of the otherwise normal-appearing gallbladder.  4.  Mild diffuse hepatic steatosis.   US Abdomen Limited    Impression    IMPRESSION:    1.  Mild gallbladder sludge and/or sand-like cholelithiasis. No evidence for acute cholecystitis.    2.  Grossly stable mild biliary dilatation. Please see recent MRCP.    3.  Pancreatitis changes better seen on cross-sectional imaging.    4.  Mild hepatic steatosis.         Most Recent 3 CBC's:  Recent Labs   Lab Test 08/30/21  0557 08/29/21  0701 08/28/21  0651   WBC 8.2 9.5 11.6*   HGB 9.2* 9.7* 10.1*   MCV 86 86 87    250 265     Most Recent 3 BMP's:  Recent Labs   Lab Test 08/30/21  1344 08/30/21  1106 08/30/21  0840 08/30/21  0557 08/29/21  0701 08/28/21  0621   NA  --   --   --  141 141 138   POTASSIUM  --   --   --  3.7 3.9 3.8   CHLORIDE  --   --   --  107 107 103   CO2  --   --   --  26 24 27   BUN  --   --   --  6* 6* 7*   CR  --   --   --  0.70 0.72 0.87   ANIONGAP  --   --   --  8 10 8   LAKEISHA  --   --   --  9.0 9.4 9.2   * 155* 135* 165* 162* 223*     Most Recent 2 LFT's:  Recent Labs   Lab Test 08/30/21  0557 08/29/21  0701   * 115*   * 145*   ALKPHOS 85 82   BILITOTAL 3.4* 3.2*     Nette Fleming MD  [facility] Medicine Service  [phone]

## 2021-08-30 NOTE — PROGRESS NOTES
Brief GI Note  Jo-Ann feels well today. No abd pain. This morning she felt like there was mold in her room so was moved to another room. Tbili up a little today to 3.4. May have sludge, microlithiasis in CBD. Suspect this is mild gallstone pancreatitis. Will undergo ERCP today. I consulted surgery for eventual cholecystectomy given risk of recurrent pancreatitis.

## 2021-08-31 VITALS
RESPIRATION RATE: 20 BRPM | OXYGEN SATURATION: 96 % | BODY MASS INDEX: 34.57 KG/M2 | HEART RATE: 67 BPM | SYSTOLIC BLOOD PRESSURE: 148 MMHG | HEIGHT: 65 IN | TEMPERATURE: 98 F | WEIGHT: 207.5 LBS | DIASTOLIC BLOOD PRESSURE: 68 MMHG

## 2021-08-31 LAB
ALBUMIN SERPL-MCNC: 2.8 G/DL (ref 3.5–5)
ALP SERPL-CCNC: 102 U/L (ref 45–120)
ALT SERPL W P-5'-P-CCNC: 135 U/L (ref 0–45)
ANION GAP SERPL CALCULATED.3IONS-SCNC: 8 MMOL/L (ref 5–18)
AST SERPL W P-5'-P-CCNC: 52 U/L (ref 0–40)
BILIRUB SERPL-MCNC: 1.4 MG/DL (ref 0–1)
BUN SERPL-MCNC: 10 MG/DL (ref 8–22)
CALCIUM SERPL-MCNC: 9.3 MG/DL (ref 8.5–10.5)
CHLORIDE BLD-SCNC: 102 MMOL/L (ref 98–107)
CO2 SERPL-SCNC: 28 MMOL/L (ref 22–31)
CREAT SERPL-MCNC: 0.76 MG/DL (ref 0.6–1.1)
ERYTHROCYTE [DISTWIDTH] IN BLOOD BY AUTOMATED COUNT: 13.2 % (ref 10–15)
GFR SERPL CREATININE-BSD FRML MDRD: 85 ML/MIN/1.73M2
GLUCOSE BLD-MCNC: 246 MG/DL (ref 70–125)
GLUCOSE BLDC GLUCOMTR-MCNC: 203 MG/DL (ref 70–125)
GLUCOSE BLDC GLUCOMTR-MCNC: 218 MG/DL (ref 70–125)
HCT VFR BLD AUTO: 28.9 % (ref 35–47)
HGB BLD-MCNC: 9.1 G/DL (ref 11.7–15.7)
LIPASE SERPL-CCNC: 61 U/L (ref 0–52)
MCH RBC QN AUTO: 27.2 PG (ref 26.5–33)
MCHC RBC AUTO-ENTMCNC: 31.5 G/DL (ref 31.5–36.5)
MCV RBC AUTO: 87 FL (ref 78–100)
PLATELET # BLD AUTO: 285 10E3/UL (ref 150–450)
POTASSIUM BLD-SCNC: 3.7 MMOL/L (ref 3.5–5)
PROT SERPL-MCNC: 7.1 G/DL (ref 6–8)
RBC # BLD AUTO: 3.34 10E6/UL (ref 3.8–5.2)
SODIUM SERPL-SCNC: 138 MMOL/L (ref 136–145)
WBC # BLD AUTO: 8.6 10E3/UL (ref 4–11)

## 2021-08-31 PROCEDURE — 99231 SBSQ HOSP IP/OBS SF/LOW 25: CPT | Performed by: SPECIALIST

## 2021-08-31 PROCEDURE — 250N000009 HC RX 250: Performed by: INTERNAL MEDICINE

## 2021-08-31 PROCEDURE — 83690 ASSAY OF LIPASE: CPT | Performed by: SPECIALIST

## 2021-08-31 PROCEDURE — 82040 ASSAY OF SERUM ALBUMIN: CPT | Performed by: INTERNAL MEDICINE

## 2021-08-31 PROCEDURE — 94640 AIRWAY INHALATION TREATMENT: CPT

## 2021-08-31 PROCEDURE — 258N000003 HC RX IP 258 OP 636: Performed by: INTERNAL MEDICINE

## 2021-08-31 PROCEDURE — 36415 COLL VENOUS BLD VENIPUNCTURE: CPT | Performed by: INTERNAL MEDICINE

## 2021-08-31 PROCEDURE — 85027 COMPLETE CBC AUTOMATED: CPT | Performed by: INTERNAL MEDICINE

## 2021-08-31 PROCEDURE — 99239 HOSP IP/OBS DSCHRG MGMT >30: CPT | Performed by: HOSPITALIST

## 2021-08-31 RX ADMIN — SODIUM CHLORIDE: 9 INJECTION, SOLUTION INTRAVENOUS at 00:18

## 2021-08-31 RX ADMIN — ALBUTEROL SULFATE 2.5 MG: 2.5 SOLUTION RESPIRATORY (INHALATION) at 02:36

## 2021-08-31 RX ADMIN — INSULIN ASPART 2 UNITS: 100 INJECTION, SOLUTION INTRAVENOUS; SUBCUTANEOUS at 09:00

## 2021-08-31 RX ADMIN — ALBUTEROL SULFATE 2.5 MG: 2.5 SOLUTION RESPIRATORY (INHALATION) at 08:19

## 2021-08-31 NOTE — PLAN OF CARE
Shift from 0700 to 1930-    Problem: Adult Inpatient Plan of Care  Goal: Plan of Care Review  Outcome: Change based on patient need/priority  Goal: Patient-Specific Goal (Individualized)  Outcome: Change based on patient need/priority  Goal: Absence of Hospital-Acquired Illness or Injury  Outcome: Change based on patient need/priority  Intervention: Identify and Manage Fall Risk  Recent Flowsheet Documentation  Taken 8/31/2021 0863 by Shaista Arriola RN  Safety Promotion/Fall Prevention:   clutter free environment maintained   nonskid shoes/slippers when out of bed   patient and family education   room organization consistent   safety round/check completed    Patient declined surgery; diet advanced and tolerated. Discharging to home.

## 2021-08-31 NOTE — PLAN OF CARE
Shift from 0700 to 1930-    Problem: Pain (Pancreatitis)  Goal: Acceptable Pain Control  Outcome: Improving  Intervention: Monitor and Manage Pain  Recent Flowsheet Documentation  Taken 8/30/2021 1610 by Shaista Arriola RN  Pain Management Interventions:   emotional support   cold applied     Problem: Anxiety  Goal: Anxiety Reduction or Resolution  Outcome: Improving   Patient anxious today about ERCP. Went for procedure at 1020 and returned at 1440; Tolerated well. Patient get anxious then lungs diminish and wheeze; RT gave neb this am and when she returned from ERCP. Lungs then were clear. O2 sats stable during.   IVF Sl'd; Passing flatus. Started on clears. Tolerated well.   Up ambulating in hallway.   NPO after MN for possible surgery. Ramsey consulted.

## 2021-08-31 NOTE — DISCHARGE INSTRUCTIONS
I updated your med list to reflect that you have not been taking simvastatin.    No NSAIDs for 2 weeks then ok to resume cautiously.

## 2021-08-31 NOTE — PLAN OF CARE
Problem: Adult Inpatient Plan of Care  Goal: Absence of Hospital-Acquired Illness or Injury  Intervention: Prevent Skin Injury  Recent Flowsheet Documentation  Taken 8/31/2021 0022 by Anuja Botello RN  Body Position: position changed independently     Problem: Pain (Pancreatitis)  Goal: Acceptable Pain Control  Intervention: Monitor and Manage Pain  Recent Flowsheet Documentation  Taken 8/31/2021 0022 by Anuja Botello RN  Pain Management Interventions:    rest    distraction    declines  Pt is NPO for possible cholecystectomy today. Denies abdominal pain. Endorses throat pain from previous procedure(ercp). Initially rated throat pain at 4/10, pain worsened to 7/10 and pt was wheezing and endorsed difficulty breathing, notified house officer, GI cocktail ordered but pt declined. Prn nebulizer given for wheezing with improvement. Pt also had ice chips to help with pain.  IV fluids infusing, pt called writer that she started to feel throbbing pain at I.v site, fluids paused. Iv site looks okay without infiltration signs per writer. Spot checked BG level at 0200 and was 203.     Pt planning to decline surgery today  due to her throat feeling raw from ercp yesterday. Notified house, surgery team  will address.

## 2021-08-31 NOTE — PROGRESS NOTES
[facility] DIGESTIVE HEALTH PROGRESS NOTE      Subjective:              Jo-Ann is feeling well this morning.  Much earlier this morning she was having significant throat pain and discomfort after waking up.  This resolved with eating ice chips getting a nebulizer treatment.  She was having some throat soreness following her ERCP yesterday.  Overall she feels much better currently is not having any abdominal pain.  She tolerated a regular diet.    Objective:                  Vital signs in last 24 hrs;  Temp:  [98  F (36.7  C)-99  F (37.2  C)] 98  F (36.7  C)  Pulse:  [65-97] 67  Resp:  [16-23] 20  BP: (148-166)/(68-83) 148/68  SpO2:  [91 %-100 %] 96 %    Physical Exam:   General: Comfortable appearing. Awake.   Cardiovascular: Regular rate. No edema  Chest: Non-labored breathing. Symmetric chest rise.   Abdomen: non-distended  Neurologic: Alert, no focal defects.     Current Labs:  CBC RESULTS: Recent Labs   Lab Test 08/31/21  0647   WBC 8.6   RBC 3.34*   HGB 9.1*   HCT 28.9*   MCV 87   MCH 27.2   MCHC 31.5   RDW 13.2           CMP Results:   Recent Labs   Lab Test 08/31/21  0836 08/31/21  0647   NA  --  138   POTASSIUM  --  3.7   CHLORIDE  --  102   CO2  --  28   ANIONGAP  --  8   * 246*   BUN  --  10   CR  --  0.76   BILITOTAL  --  1.4*   ALKPHOS  --  102   ALT  --  135*   AST  --  52*        INR Results: No results for input(s): INR in the last 72652 hours.       Relevant Imaging:  No new imaging    Assessment:       Mild gallstone pancreatitis  Cholelithiasis/gallbladder sludge  -Jo-Ann is now status post ERCP which was completed August 30.  That did show dilated bile ducts with sludge.  Sphincterotomy was performed and sludge was removed.  Her liver labs have improved today with bilirubin dropping from 3.4-1.4.  Jo-Ann feels very good currently and is ready to go home.  She would prefer to wait a couple weeks before undergoing lap Dayana.  I did let her know that she is at risk for recurrent pancreatitis  but that that risk is diminished somewhat following her ERCP with sphincterotomy yesterday.    Plan:     Agree with discharge home today and follow-up with surgery for planned lap chirag.  Clear with low fat diet from GI standpoint  No further GI follow-up needed.  We will now sign off.  Please call with additional questions or concerns.            Casey Lopez, DO  Thank you for the opportunity to participate in the care of this patient.   Please feel free to call me with any questions or concerns.  Phone number [phone].

## 2021-08-31 NOTE — PROGRESS NOTES
RESPIRATORY CARE NOTE     Patient Name: Jo-Ann Jeffery  Today's Date: 8/31/2021       Pt continues to receive albuterol. BS are clear. Pt is on RA, SpO2 is 96%. Post treatment there is increased aeration. Pt also perceives improvement.  RT encouraged deep breathing and coughing techniques .  RT will continue to monitor and assess.

## 2021-08-31 NOTE — DISCHARGE SUMMARY
[facility] MEDICINE  DISCHARGE SUMMARY     Primary Care Physician: Shell Hassan  Admission Date: 8/25/2021   Discharge Provider: Roselia Moody Discharge Date: 8/31/2021   Diet:   Active Diet and Nourishment Order   Procedures     Combination Diet Consistent Carb 60 Grams CHO per Meal Diet; Low Fat Diet     Diet       Code Status: Full Code   Activity: DCACTIVITY: Activity as tolerated        Condition at Discharge: Stable     REASON FOR PRESENTATION(See Admission Note for Details)   abd pain    PRINCIPAL & ACTIVE DISCHARGE DIAGNOSES     Active Problems:    Diabetes mellitus, type 2 (H)    Pancreatitis    Bronchospasm      PENDING LABS     Unresulted Labs Ordered in the Past 30 Days of this Admission     No orders found from 7/26/2021 to 8/26/2021.          PROCEDURES ( this hospitalization only)      Procedure(s):  ENDOSCOPIC RETROGRADE CHOLANGIOPANCREATOGRAPHY WITH SPHINCTEROTOMY    RECOMMENDATIONS TO OUTPATIENT PROVIDER FOR F/U VISIT     Follow-up Appointments     Follow-up and recommended labs and tests       Follow up with primary care provider, Shell Hassan, within 7 days for   hospital follow- up.  The following labs/tests are recommended: liver   enzymes  Follow up with Surgeon in 2 weeks.             DISPOSITION     Home    SUMMARY OF HOSPITAL COURSE:      Jo-Ann Jeffery is a 61 year old female admitted on 8/25/2021. She has history of diabetes, lumbar disc disease, osteoarthritis, 47 medication allergies presents for evaluation of abdominal pain found to have pancreatitis.    #Acute pancreatitis  Ultimately found to be likely due to biliary obstruction due to sludge.  She was seen by GI, underwent ERCP and sludge was swept from the bile duct and sphincterotomy was performed.  Liver enzymes have been trending up and now down after procedure.  Symptomatically she is feeling better and tolerating oral diet.  Seen by general surgeon and cholecystectomy is recommended and  will be done later as an outpatient.  Would recheck liver enzymes in perhaps a month to ensure that they normalize.    #?Allergic reaction  Following ERCP, patient complained of acute shortness of breath.  She attributed this to mold in her hospital room and asked to change rooms.  This morning when I see her she sounded acutely hoarse and complained she was feeling terrible.  However over the course of our conversation her hoarseness seemed to spontaneously resolve and she indicated that she wanted to go home.  No wheezing on auscultation, sounds all seemed to be upper airway in nature.  Do not think presentation this morning represents bronchospasm.  I did not examine her last night.  Patient does have a certain degree of anxiety about her numerous allergies.    #HLD  Patient has not been taking her simvastatin anyway and so I did remove this from her med list.    Discharge Medications with Med changes:     Current Discharge Medication List      CONTINUE these medications which have NOT CHANGED    Details   albuterol (PROAIR HFA/PROVENTIL HFA/VENTOLIN HFA) 108 (90 Base) MCG/ACT inhaler Inhale 2 puffs into the lungs every 6 hours as needed for shortness of breath / dyspnea or wheezing      !! calcium-magnesium (CALMAG) 500-250 MG TABS per tablet Take 2 tablets by mouth every evening      !! calcium-magnesium (CALMAG) 500-250 MG TABS per tablet Take 2 tablets by mouth daily as needed (anxiety)       insulin  UNIT/ML vial Inject 20 Units Subcutaneous 2 times daily (before meals)      multivitamin w/minerals (THERA-VIT-M) tablet Take 1 tablet by mouth daily      vitamin D3 (CHOLECALCIFEROL) 50 mcg (2000 units) tablet Take 50 mcg by mouth daily 3 gummies = 2000 units      Zinc Oxide-Vitamin C (ZINC PLUS VITAMIN C PO) Take 3 tablets by mouth daily       !! - Potential duplicate medications found. Please discuss with provider.      STOP taking these medications       simvastatin (ZOCOR) 20 MG tablet Comments:    Reason for Stopping:                 Consults     SOCIAL WORK IP CONSULT  GASTROENTEROLOGY IP CONSULT  NUTRITION SERVICES ADULT IP CONSULT  SURGERY GENERAL IP CONSULT  SURGERY GENERAL IP CONSULT  SURGERY GENERAL IP CONSULT    SIGNIFICANT IMAGING FINDINGS     Results for orders placed or performed during the hospital encounter of 08/25/21   CT Abdomen Pelvis w/o Contrast    Impression    IMPRESSION:   1.  Acute pancreatitis.  2.  Cholelithiasis.     Abdomen US, limited (RUQ only)    Impression    IMPRESSION:  1.  Mild extrahepatic biliary ductal dilatation and borderline dilatation of the main pancreatic duct. No obstructing stone is identified on this exam. Further evaluation with MRCP may be helpful.  2.  8 mm non shadowing nodular structure in the gallbladder neck, more likely a gallbladder polyp rather than gallstone. Recommend 6-12 month ultrasound follow-up. No evidence of acute cholecystitis.       MR Abdomen MRCP without Contrast    Impression    IMPRESSION:  1.  Parenchymal edema throughout the pancreas and within the adjacent peripancreatic and retroperitoneal fat with uncomplicated acute pancreatitis.  2.  Mild intra and extrahepatic bile duct dilatation that tapers smoothly to the ampulla with no stone, stricture or mass.  3.  Trace amount of stone material and minimal dilatation of the otherwise normal-appearing gallbladder.  4.  Mild diffuse hepatic steatosis.   US Abdomen Limited    Impression    IMPRESSION:    1.  Mild gallbladder sludge and/or sand-like cholelithiasis. No evidence for acute cholecystitis.    2.  Grossly stable mild biliary dilatation. Please see recent MRCP.    3.  Pancreatitis changes better seen on cross-sectional imaging.    4.  Mild hepatic steatosis.       XR ERCP    Impression    IMPRESSION:  1.  Mild extrahepatic bile duct dilatation. No filling defects appreciated.  2.  A radiologist was not present at the time of the procedure. See the operative report for details.         SIGNIFICANT LABORATORY FINDINGS     Most Recent 2 LFT's:Recent Labs   Lab Test 08/31/21  0647 08/30/21  0557   AST 52* 100*   * 146*   ALKPHOS 102 85   BILITOTAL 1.4* 3.4*       Discharge Orders        Reason for your hospital stay    pancreatitis     Follow-up and recommended labs and tests     Follow up with primary care provider, Shell Hassan, within 7 days for hospital follow- up.  The following labs/tests are recommended: liver enzymes  Follow up with Surgeon in 2 weeks.     Activity    Your activity upon discharge: activity as tolerated     When to contact your care team    Call your primary doctor if you have any of the following: worsening pain, fever, nausea, stool unusually light in color, skin or eyes turning yellow, or any other symptoms that are new or concerning to you.     Diet    Follow this diet upon discharge: Lowfat diet       Examination   Physical Exam   Temp:  [98  F (36.7  C)-99  F (37.2  C)] 98  F (36.7  C)  Pulse:  [65-97] 67  Resp:  [16-23] 20  BP: (148-166)/(68-83) 148/68  SpO2:  [91 %-100 %] 96 %  Wt Readings from Last 1 Encounters:   08/27/21 94.1 kg (207 lb 8 oz)       General: in no apparent distress, non-toxic and alert female lying in hospital bed oriented x3  HEENT: Head normocephalic atraumatic, oral mucosa moist. Sclerae anicteric. Voice sounds stridulous but spontaneously normalizes during course of conversation.  CV: Regular rhythm, normal rate, no murmurs  Resp: No wheezes, no rales or rhonchi, no focal consolidations. Occasional inspiratory wheezes sound upper airway in origin and not consistent.  GI: Belly soft, nondistended, nontender, bowel sounds present  Skin: No rashes or lesions  Extremities: No peripheral edema  Psych: Normal affect, mood euthymic  Neuro: CNII-XII grossly intact, moving all 4 extremities    Please see EMR for more detailed significant labs, imaging, consultant notes etc.    Roselia SOMMERS MD, personally saw the patient today and spent  greater than 30 minutes discharging this patient.    Roselia Moody MD  [facility]    CC:Shell Hassan

## 2021-08-31 NOTE — CONSULTS
Consultation - Surgery  Jo-Ann Serrato,  1960, MRN [id number]    Admitting Dx: Pancreatitis [K85.90]  Acute pancreatitis, unspecified complication status, unspecified pancreatitis type [K85.90]    PCP: Shell Hassan, [phone]   Code status:  Full Code       Extended Emergency Contact Information  Primary Emergency Contact: NUHA SERRATO  Mobile Phone: [phone]  Relation: Spouse  Secondary Emergency Contact: SHASHANK SERRATO  Mobile Phone: [phone]  Relation: Other       Assessment and Plan   Impression:  Cholelithiasis, status post ERCP yesterday for gallstone pancreatitis.  The patient states she has a severe sore throat and is otherwise feeling well from the standpoint of her abdomen.  She does not feel up to having another procedure now.  There is no urgency to proceeding with laparoscopic cholecystectomy.  I told her it is totally her choice.  If she had wanted to we would have done it today but she would prefer to do it delayed.      Plan:  Follow-up in our clinic in about 2 weeks just to be sure she is doing okay and then we will set up an outpatient laparoscopic cholecystectomy.           Chief Complaint <principal problem not specified>       HPI    We have been requested by Alfredito Rivas to evaluate Jo-Ann Serrato for cholelithiasis.  This is a 61 year old year old female who was admitted several days ago with gallstone pancreatitis.  She underwent ERCP yesterday with removal of stones.  We were consulted for laparoscopic cholecystectomy.  The patient however states that she has a severe sore throat.  She does not feel up to having any further surgery at this time.  She denies any abdominal pain at this time.       Medical History  History reviewed. No pertinent past medical history.    History reviewed. No pertinent surgical history.  Look at this patient's allergy Surgical History  She  has no past surgical history on file.   Social History  Reviewed, and she  reports that she has  never smoked. She has never used smokeless tobacco.   Allergies  Allergies   Allergen Reactions     Azithromycin Hives     Cephalexin Hives     Contrast Dye Shortness Of Breath     Erythromycin Hives     hives       Latex Hives     hands break out with latex gloves     Moxifloxacin Anaphylaxis     Penicillins Hives     nausea       Soap Hives     Tide soap, Soft soap, Hien Dish soap     Sulfamethoxazole-Trimethoprim Nausea     Amides      Aminoglycosides      Amitriptyline      Antipyrine-Benzocaine      Cefprozil Hives     Chocolate Flavor      Annotation: pudding       Ciprofloxacin Hives     Clindamycin Other (See Comments)     Severe diarrhea     Clonazepam      Hives       Dairy Products [Milk Protein Extract]      Tachycardia       Diphenhydramine Other (See Comments)     Racing heart     Fluoxetine      Hives       Glipizide Other (See Comments)     Muscle pain/muscle twitching     Guaifenesin & Derivatives      Hypertension     Hydrocodone-Acetaminophen      Iodides      swells up, burns      Iron      Lactulose Nausea and Vomiting     Loperamide      Macrolides      Hives       Memantine      Metformin Headache     Methylprednisolone Nausea and Swelling     Metoclopramide      Morphine      Neomycin      ear drops-ears swell up inside     Prochlorperazine      Pseudoephedrine-Guaifenesin Cr      Quinolones      Other reaction       Sodium Chloride Swelling     Strawberry (Diagnostic) Swelling     Sulfa Drugs      Tartrazine Difficulty breathing     Acetic Acid Rash     Antipyrine Rash     Food Palpitations     Chocolate and butterscottch, strawberries, walnuts     Imidazole Antifungals Rash and Unknown     Nuts Palpitations     Chocolate and butterscottch, strawberries, walnuts    Family History  Reviewed, and family history includes Breast Cancer in her maternal grandmother; Cerebrovascular Disease (age of onset: 62) in her father; Cerebrovascular Disease (age of onset: 63) in her brother; Diabetes in her  father; Gallbladder Disease in her maternal aunt and maternal grandmother; Hypertension in her mother; Syncope in her mother; Throat cancer in her maternal aunt.  The Family history is not pertinent to the patients chief complaint. Psychosocial Needs  Social History     Social History Narrative     Not on file     Additional psychosocial needs reviewed per nursing assessment.     Prior to Admission Medications   Medications Prior to Admission   Medication Sig Dispense Refill Last Dose     albuterol (PROAIR HFA/PROVENTIL HFA/VENTOLIN HFA) 108 (90 Base) MCG/ACT inhaler Inhale 2 puffs into the lungs every 6 hours as needed for shortness of breath / dyspnea or wheezing   Past Month at July     calcium-magnesium (CALMAG) 500-250 MG TABS per tablet Take 2 tablets by mouth every evening   8/24/2021 at hs     calcium-magnesium (CALMAG) 500-250 MG TABS per tablet Take 2 tablets by mouth daily as needed (anxiety)    PRN     insulin  UNIT/ML vial Inject 20 Units Subcutaneous 2 times daily (before meals)   8/24/2021 at pm     multivitamin w/minerals (THERA-VIT-M) tablet Take 1 tablet by mouth daily   8/24/2021 at am     vitamin D3 (CHOLECALCIFEROL) 50 mcg (2000 units) tablet Take 50 mcg by mouth daily 3 gummies = 2000 units   8/24/2021 at am     Zinc Oxide-Vitamin C (ZINC PLUS VITAMIN C PO) Take 3 tablets by mouth daily   8/24/2021 at am     simvastatin (ZOCOR) 20 MG tablet Take 20 mg by mouth At Bedtime   NOT TAKING          Review of Systems:  A comprehensive review of systems was negative. Physical Exam:  Temp:  [98  F (36.7  C)-99  F (37.2  C)] 98  F (36.7  C)  Pulse:  [65-97] 67  Resp:  [16-23] 20  BP: (145-166)/(68-83) 148/68  SpO2:  [91 %-100 %] 96 %    General appearance: alert, appears stated age, cooperative and mild distress  Eyes: negative  Abdomen: soft, non-tender; bowel sounds normal; no masses,  no organomegaly         Pertinent Labs  Lab Results: personally reviewed.   Lab Results   Component Value Date      08/31/2021    CO2 28 08/31/2021    BUN 10 08/31/2021     Lab Results   Component Value Date    WBC 8.6 08/31/2021    HGB 9.1 08/31/2021    HCT 28.9 08/31/2021    MCV 87 08/31/2021     08/31/2021   Persistent slight elevation in LFTs     Pertinent Radiology  Radiology Results: Reviewed her ultrasound                   3

## 2021-08-31 NOTE — PROVIDER NOTIFICATION
Notified Dr. Romero about patient's throat pain rated 7/10. GI cocktail ordered. Will continue to monitor.

## 2021-09-11 ENCOUNTER — HOSPITAL ENCOUNTER (EMERGENCY)
Facility: HOSPITAL | Age: 61
Discharge: HOME | End: 2021-09-11
Admitting: STUDENT IN AN ORGANIZED HEALTH CARE EDUCATION/TRAINING PROGRAM
Payer: COMMERCIAL

## 2021-09-11 VITALS
TEMPERATURE: 98.4 F | WEIGHT: 205 LBS | SYSTOLIC BLOOD PRESSURE: 177 MMHG | BODY MASS INDEX: 34.16 KG/M2 | HEART RATE: 90 BPM | HEIGHT: 65 IN | RESPIRATION RATE: 18 BRPM | DIASTOLIC BLOOD PRESSURE: 77 MMHG | OXYGEN SATURATION: 95 %

## 2021-09-11 LAB
ALBUMIN UR-MCNC: NEGATIVE MG/DL
APPEARANCE UR: CLEAR
BACTERIA #/AREA URNS HPF: ABNORMAL /HPF
BILIRUB UR QL STRIP: NEGATIVE
COLOR UR AUTO: ABNORMAL
GLUCOSE UR STRIP-MCNC: NEGATIVE MG/DL
HGB UR QL STRIP: NEGATIVE
KETONES UR STRIP-MCNC: NEGATIVE MG/DL
LEUKOCYTE ESTERASE UR QL STRIP: NEGATIVE
NITRATE UR QL: NEGATIVE
PH UR STRIP: 5.5 [PH] (ref 5–7)
RBC URINE: <1 /HPF
SP GR UR STRIP: 1 (ref 1–1.03)
SQUAMOUS EPITHELIAL: <1 /HPF
UROBILINOGEN UR STRIP-MCNC: <2 MG/DL
WBC URINE: 1 /HPF

## 2021-09-11 PROCEDURE — 81001 URINALYSIS AUTO W/SCOPE: CPT | Performed by: STUDENT IN AN ORGANIZED HEALTH CARE EDUCATION/TRAINING PROGRAM

## 2021-09-11 PROCEDURE — 999N000104 HC STATISTIC NO CHARGE

## 2021-09-11 ASSESSMENT — MIFFLIN-ST. JEOR: SCORE: 1495.75

## 2021-09-11 NOTE — ED TRIAGE NOTES
The pt presents for evaluation of abdominal pain. She was seen on 8/25 for pancreatitis and sent home. She was recently started on doxycycline for a sinus infection. She reports around the same time she began to have severe abdominal pain.

## 2021-09-21 ENCOUNTER — OFFICE VISIT (OUTPATIENT)
Dept: SURGERY | Facility: CLINIC | Age: 61
End: 2021-09-21
Payer: COMMERCIAL

## 2021-09-21 ENCOUNTER — LAB (OUTPATIENT)
Dept: LAB | Facility: CLINIC | Age: 61
End: 2021-09-21

## 2021-09-21 VITALS
SYSTOLIC BLOOD PRESSURE: 107 MMHG | BODY MASS INDEX: 33.95 KG/M2 | WEIGHT: 204 LBS | DIASTOLIC BLOOD PRESSURE: 67 MMHG | HEART RATE: 60 BPM

## 2021-09-21 DIAGNOSIS — K85.10 GALLSTONE PANCREATITIS: ICD-10-CM

## 2021-09-21 DIAGNOSIS — K85.10 GALLSTONE PANCREATITIS: Primary | ICD-10-CM

## 2021-09-21 DIAGNOSIS — K80.20 CALCULUS OF GALLBLADDER WITHOUT CHOLECYSTITIS WITHOUT OBSTRUCTION: ICD-10-CM

## 2021-09-21 LAB — LIPASE SERPL-CCNC: 149 U/L (ref 0–52)

## 2021-09-21 PROCEDURE — 83690 ASSAY OF LIPASE: CPT

## 2021-09-21 PROCEDURE — 36415 COLL VENOUS BLD VENIPUNCTURE: CPT

## 2021-09-21 PROCEDURE — 99214 OFFICE O/P EST MOD 30 MIN: CPT | Performed by: SPECIALIST

## 2021-09-21 NOTE — PROGRESS NOTES
CC: Gallbladder Problems    HPI:  Jo-Ann comes in for follow-up of a hospital stay for gallstone pancreatitis.  She had undergone ERCP but did not feel well enough the day after that to have her surgery to remove the gallbladder.  She is overall feeling well now.  She occasionally gets twinges of abdominal pain depending on what she eats.  Her biggest complaint is severe jaw pain that she relates to her surgery for the ERCP.    Please see Chart Review for PMH, medication list, allergies, FH and social history.  Past Medical History:   Diagnosis Date     Depressive disorder      Past Surgical History:   Procedure Laterality Date     ENDOSCOPIC RETROGRADE CHOLANGIOPANCREATOGRAM N/A 8/30/2021    Procedure: ENDOSCOPIC RETROGRADE CHOLANGIOPANCREATOGRAPHY WITH SPHINCTEROTOMY;  Surgeon: Stephen Rivas MD;  Location: [facility] OR         Current Outpatient Medications:      albuterol (PROAIR HFA/PROVENTIL HFA/VENTOLIN HFA) 108 (90 Base) MCG/ACT inhaler, Inhale 2 puffs into the lungs every 6 hours as needed for shortness of breath / dyspnea or wheezing, Disp: , Rfl:      calcium-magnesium (CALMAG) 500-250 MG TABS per tablet, Take 2 tablets by mouth every evening, Disp: , Rfl:      insulin  UNIT/ML vial, Inject 20 Units Subcutaneous 2 times daily (before meals), Disp: , Rfl:      multivitamin w/minerals (THERA-VIT-M) tablet, Take 1 tablet by mouth daily, Disp: , Rfl:      vitamin D3 (CHOLECALCIFEROL) 50 mcg (2000 units) tablet, Take 50 mcg by mouth daily 3 gummies = 2000 units (Patient not taking: Reported on 9/21/2021), Disp: , Rfl:      Zinc Oxide-Vitamin C (ZINC PLUS VITAMIN C PO), Take 3 tablets by mouth daily (Patient not taking: Reported on 9/21/2021), Disp: , Rfl:        Allergies   Allergen Reactions     Azithromycin Hives     Cephalexin Hives     Contrast Dye Shortness Of Breath     Erythromycin Hives     hives       Latex Hives     hands break out with latex gloves     Moxifloxacin Anaphylaxis      Penicillins Hives     nausea       Soap Hives     Tide soap, Soft soap, Hien Dish soap     Sulfamethoxazole-Trimethoprim Nausea     Amides      Aminoglycosides      Amitriptyline      Antipyrine-Benzocaine      Cefprozil Hives     Chocolate Flavor      Annotation: pudding       Ciprofloxacin Hives     Clindamycin Other (See Comments)     Severe diarrhea     Clonazepam      Hives       Dairy Products [Milk Protein Extract]      Tachycardia       Diphenhydramine Other (See Comments)     Racing heart     Fluoxetine      Hives       Glipizide Other (See Comments)     Muscle pain/muscle twitching     Guaifenesin & Derivatives      Hypertension     Hydrocodone-Acetaminophen      Iodides      swells up, burns      Iron      Lactulose Nausea and Vomiting     Loperamide      Macrolides      Hives       Memantine      Metformin Headache     Methylprednisolone Nausea and Swelling     Metoclopramide      Morphine      Neomycin      ear drops-ears swell up inside     Prochlorperazine      Pseudoephedrine-Guaifenesin Cr      Quinolones      Other reaction       Sodium Chloride Swelling     Strawberry (Diagnostic) Swelling     Sulfa Drugs      Tartrazine Difficulty breathing     Acetic Acid Rash     Antipyrine Rash     Food Palpitations     Chocolate and butterscottch, strawberries, walnuts     Imidazole Antifungals Rash and Unknown     Nuts Palpitations     Chocolate and butterscottch, strawberries, walnuts       Social History     Socioeconomic History     Marital status:      Spouse name: None     Number of children: None     Years of education: None     Highest education level: None   Occupational History     None   Tobacco Use     Smoking status: Never Smoker     Smokeless tobacco: Never Used   Substance and Sexual Activity     Alcohol use: Not Currently     Drug use: None     Sexual activity: None   Other Topics Concern     None   Social History Narrative     None     Social Determinants of Health     Financial  Resource Strain:      Difficulty of Paying Living Expenses:    Food Insecurity:      Worried About Running Out of Food in the Last Year:      Ran Out of Food in the Last Year:    Transportation Needs:      Lack of Transportation (Medical):      Lack of Transportation (Non-Medical):    Physical Activity:      Days of Exercise per Week:      Minutes of Exercise per Session:    Stress:      Feeling of Stress :    Social Connections:      Frequency of Communication with Friends and Family:      Frequency of Social Gatherings with Friends and Family:      Attends Yarsani Services:      Active Member of Clubs or Organizations:      Attends Club or Organization Meetings:      Marital Status:    Intimate Partner Violence:      Fear of Current or Ex-Partner:      Emotionally Abused:      Physically Abused:      Sexually Abused:          Pertinent items are noted in HPI.    Physical Exam:  /67   Pulse 60   Wt 92.5 kg (204 lb)   BMI 33.95 kg/m      General:alert, appears stated age, cooperative and mildly obese  Eyes: negative  Lungs: clear to auscultation bilaterally  CV:regular rate and rhythm  Abdomen:soft, non-tender; bowel sounds normal; no masses,  no organomegaly  Neuro: Grossly normal    Studies:  Lab Results   Component Value Date    WBC 8.6 08/31/2021    HGB 9.1 (L) 08/31/2021    HCT 28.9 (L) 08/31/2021    MCV 87 08/31/2021     08/31/2021     Lab Results   Component Value Date     (H) 08/31/2021    AST 52 (H) 08/31/2021    GGT 18 08/26/2021    ALKPHOS 102 08/31/2021       Most recent lipase 125    Impression:    Cholelithiasis with recent choledocholithiasis.  Tend to recommend laparoscopic cholecystectomy to prevent further problems in the future.  Risks of surgery including beeding, infection, bile leak and common bile duct injury were explained along with potential benefits. Appropriate questions were asked and answered and they wish to proceed. Typically and outpatient procedure.  The  patient is concerned because of multiple other issues that she would need to spend the night in the hospital.  I reassured her that this is almost always able to be done as an outpatient.  The fact is if she think she needs a night in the hospital, then we need to defer her surgery for some time until the Covid pandemic has essentially resolved.    Plan:  Laparoscopic Cholecystectomy.  Follow-up with me after surgery can be as needed if there are any questions or concerns.  We have encouraged the patient to call if they have any questions.  Typically is a follow-up call from her nurses also.  She wanted know how long she could wait to do this.  I told her that it is really her call.  There is no way to know if or when she will have another bad attack.  We will repeat her lipase again today to be sure it has completely normalized.

## 2021-09-21 NOTE — LETTER
9/21/2021         RE: Jo-Ann Jeffery  [address] [place] [address]        Dear Colleague,    Thank you for referring your patient, Jo-Ann Jeffery, to the [facility] [place]. Please see a copy of my visit note below.    CC: Gallbladder Problems    HPI:  Jo-Ann comes in for follow-up of a hospital stay for gallstone pancreatitis.  She had undergone ERCP but did not feel well enough the day after that to have her surgery to remove the gallbladder.  She is overall feeling well now.  She occasionally gets twinges of abdominal pain depending on what she eats.  Her biggest complaint is severe jaw pain that she relates to her surgery for the ERCP.    Please see Chart Review for PMH, medication list, allergies, FH and social history.  Past Medical History:   Diagnosis Date     Depressive disorder      Past Surgical History:   Procedure Laterality Date     ENDOSCOPIC RETROGRADE CHOLANGIOPANCREATOGRAM N/A 8/30/2021    Procedure: ENDOSCOPIC RETROGRADE CHOLANGIOPANCREATOGRAPHY WITH SPHINCTEROTOMY;  Surgeon: Stephen Rivas MD;  Location: [facility] OR         Current Outpatient Medications:      albuterol (PROAIR HFA/PROVENTIL HFA/VENTOLIN HFA) 108 (90 Base) MCG/ACT inhaler, Inhale 2 puffs into the lungs every 6 hours as needed for shortness of breath / dyspnea or wheezing, Disp: , Rfl:      calcium-magnesium (CALMAG) 500-250 MG TABS per tablet, Take 2 tablets by mouth every evening, Disp: , Rfl:      insulin  UNIT/ML vial, Inject 20 Units Subcutaneous 2 times daily (before meals), Disp: , Rfl:      multivitamin w/minerals (THERA-VIT-M) tablet, Take 1 tablet by mouth daily, Disp: , Rfl:      vitamin D3 (CHOLECALCIFEROL) 50 mcg (2000 units) tablet, Take 50 mcg by mouth daily 3 gummies = 2000 units (Patient not taking: Reported on 9/21/2021), Disp: , Rfl:      Zinc Oxide-Vitamin C (ZINC PLUS VITAMIN C PO), Take 3 tablets by mouth daily (Patient not  taking: Reported on 9/21/2021), Disp: , Rfl:        Allergies   Allergen Reactions     Azithromycin Hives     Cephalexin Hives     Contrast Dye Shortness Of Breath     Erythromycin Hives     hives       Latex Hives     hands break out with latex gloves     Moxifloxacin Anaphylaxis     Penicillins Hives     nausea       Soap Hives     Tide soap, Soft soap, Hien Dish soap     Sulfamethoxazole-Trimethoprim Nausea     Amides      Aminoglycosides      Amitriptyline      Antipyrine-Benzocaine      Cefprozil Hives     Chocolate Flavor      Annotation: pudding       Ciprofloxacin Hives     Clindamycin Other (See Comments)     Severe diarrhea     Clonazepam      Hives       Dairy Products [Milk Protein Extract]      Tachycardia       Diphenhydramine Other (See Comments)     Racing heart     Fluoxetine      Hives       Glipizide Other (See Comments)     Muscle pain/muscle twitching     Guaifenesin & Derivatives      Hypertension     Hydrocodone-Acetaminophen      Iodides      swells up, burns      Iron      Lactulose Nausea and Vomiting     Loperamide      Macrolides      Hives       Memantine      Metformin Headache     Methylprednisolone Nausea and Swelling     Metoclopramide      Morphine      Neomycin      ear drops-ears swell up inside     Prochlorperazine      Pseudoephedrine-Guaifenesin Cr      Quinolones      Other reaction       Sodium Chloride Swelling     Strawberry (Diagnostic) Swelling     Sulfa Drugs      Tartrazine Difficulty breathing     Acetic Acid Rash     Antipyrine Rash     Food Palpitations     Chocolate and butterscottch, strawberries, walnuts     Imidazole Antifungals Rash and Unknown     Nuts Palpitations     Chocolate and butterscottch, strawberries, walnuts       Social History     Socioeconomic History     Marital status:      Spouse name: None     Number of children: None     Years of education: None     Highest education level: None   Occupational History     None   Tobacco Use     Smoking  status: Never Smoker     Smokeless tobacco: Never Used   Substance and Sexual Activity     Alcohol use: Not Currently     Drug use: None     Sexual activity: None   Other Topics Concern     None   Social History Narrative     None     Social Determinants of Health     Financial Resource Strain:      Difficulty of Paying Living Expenses:    Food Insecurity:      Worried About Running Out of Food in the Last Year:      Ran Out of Food in the Last Year:    Transportation Needs:      Lack of Transportation (Medical):      Lack of Transportation (Non-Medical):    Physical Activity:      Days of Exercise per Week:      Minutes of Exercise per Session:    Stress:      Feeling of Stress :    Social Connections:      Frequency of Communication with Friends and Family:      Frequency of Social Gatherings with Friends and Family:      Attends Baptism Services:      Active Member of Clubs or Organizations:      Attends Club or Organization Meetings:      Marital Status:    Intimate Partner Violence:      Fear of Current or Ex-Partner:      Emotionally Abused:      Physically Abused:      Sexually Abused:          Pertinent items are noted in HPI.    Physical Exam:  /67   Pulse 60   Wt 92.5 kg (204 lb)   BMI 33.95 kg/m      General:alert, appears stated age, cooperative and mildly obese  Eyes: negative  Lungs: clear to auscultation bilaterally  CV:regular rate and rhythm  Abdomen:soft, non-tender; bowel sounds normal; no masses,  no organomegaly  Neuro: Grossly normal    Studies:  Lab Results   Component Value Date    WBC 8.6 08/31/2021    HGB 9.1 (L) 08/31/2021    HCT 28.9 (L) 08/31/2021    MCV 87 08/31/2021     08/31/2021     Lab Results   Component Value Date     (H) 08/31/2021    AST 52 (H) 08/31/2021    GGT 18 08/26/2021    ALKPHOS 102 08/31/2021       Most recent lipase 125    Impression:    Cholelithiasis with recent choledocholithiasis.  Tend to recommend laparoscopic cholecystectomy to prevent  further problems in the future.  Risks of surgery including beeding, infection, bile leak and common bile duct injury were explained along with potential benefits. Appropriate questions were asked and answered and they wish to proceed. Typically and outpatient procedure.  The patient is concerned because of multiple other issues that she would need to spend the night in the hospital.  I reassured her that this is almost always able to be done as an outpatient.  The fact is if she think she needs a night in the hospital, then we need to defer her surgery for some time until the Covid pandemic has essentially resolved.    Plan:  Laparoscopic Cholecystectomy.  Follow-up with me after surgery can be as needed if there are any questions or concerns.  We have encouraged the patient to call if they have any questions.  Typically is a follow-up call from her nurses also.  She wanted know how long she could wait to do this.  I told her that it is really her call.  There is no way to know if or when she will have another bad attack.  We will repeat her lipase again today to be sure it has completely normalized.      Again, thank you for allowing me to participate in the care of your patient.        Sincerely,        Tanja Mustafa MD

## 2021-09-22 ENCOUNTER — TELEPHONE (OUTPATIENT)
Dept: SURGERY | Facility: CLINIC | Age: 61
End: 2021-09-22
Payer: COMMERCIAL

## 2021-09-22 NOTE — TELEPHONE ENCOUNTER
MARYAM for pt to schedule surgery. She is looking for the week of 10/25. Offered the dates of 10/27 or 10/29

## 2021-09-27 ENCOUNTER — TELEPHONE (OUTPATIENT)
Dept: SURGERY | Facility: CLINIC | Age: 61
End: 2021-09-27
Payer: COMMERCIAL

## 2021-09-27 NOTE — TELEPHONE ENCOUNTER
Called Jo-Ann back regarding her voicemail about talking to an anesthesiologist about her jaw and tooth pain after her last surgery. She said she was told to talk to them before scheduling her next surgery with Dr. Mustafa.    I gave her the number to JOSUE MYERS [phone]

## 2021-10-24 ENCOUNTER — HEALTH MAINTENANCE LETTER (OUTPATIENT)
Age: 61
End: 2021-10-24

## 2021-11-01 ENCOUNTER — OFFICE VISIT (OUTPATIENT)
Dept: URGENT CARE | Facility: URGENT CARE | Age: 61
End: 2021-11-01
Payer: COMMERCIAL

## 2021-11-01 VITALS
HEIGHT: 65 IN | HEART RATE: 68 BPM | WEIGHT: 203.5 LBS | DIASTOLIC BLOOD PRESSURE: 65 MMHG | TEMPERATURE: 98.1 F | OXYGEN SATURATION: 100 % | BODY MASS INDEX: 33.91 KG/M2 | SYSTOLIC BLOOD PRESSURE: 151 MMHG

## 2021-11-01 DIAGNOSIS — R10.84 GENERALIZED ABDOMINAL PAIN: Primary | ICD-10-CM

## 2021-11-01 LAB — LIPASE SERPL-CCNC: 301 U/L (ref 73–393)

## 2021-11-01 PROCEDURE — 83690 ASSAY OF LIPASE: CPT | Performed by: FAMILY MEDICINE

## 2021-11-01 PROCEDURE — 36415 COLL VENOUS BLD VENIPUNCTURE: CPT | Performed by: FAMILY MEDICINE

## 2021-11-01 PROCEDURE — 99213 OFFICE O/P EST LOW 20 MIN: CPT | Performed by: FAMILY MEDICINE

## 2021-11-01 ASSESSMENT — MIFFLIN-ST. JEOR: SCORE: 1488.95

## 2021-11-01 NOTE — PROGRESS NOTES
"  Assessment & Plan     Generalized abdominal pain    - Lipase; Future  - Lipase    Patient desires a recheck of her lipase today after episode of acute gallstone pancreatitis and hospitalization in August.  PCP was reluctant to draw this for her so she came into UC.  Advised if returns elevated- to Follow-up urgently with PCP and General Surgery to determine next steps.  Continue avoiding fatty foods.  Close Follow-up if any change or worsening sx.    Mariana Levy MD  [facility]    Choco Busch is a 61 year old who presents for the following health issues     HPI     ERCP 8- for acute gallstone pancreatitis- was in hospital x 7 days on TPN x 5 days.    Now feels vague sx over the weekend-- is worried she has aggravated her pancreas and wishes for recheck of her lipase.  Last lipase middle of October back to normal at 77.  Denies any fever, vomiting or worsening abdominal pain.    Review of Systems   Constitutional, HEENT, cardiovascular, pulmonary, GI, , musculoskeletal, neuro, skin, endocrine and psych systems are negative, except as otherwise noted.      Objective    BP (!) 151/65   Pulse 68   Temp 98.1  F (36.7  C) (Oral)   Ht 1.651 m (5' 5\")   Wt 92.3 kg (203 lb 8 oz)   SpO2 100%   BMI 33.86 kg/m    Body mass index is 33.86 kg/m .  Physical Exam   GENERAL: healthy, alert and no distress  EYES: Eyes grossly normal to inspection, PERRL and conjunctivae and sclerae normal  NECK: no adenopathy, no asymmetry, masses, or scars and thyroid normal to palpation  ABDOMEN: soft, nontender, no hepatosplenomegaly, no masses and bowel sounds normal  MS: no gross musculoskeletal defects noted, no edema  PSYCH: mentation appears normal, affect normal/bright                "

## 2021-11-18 ENCOUNTER — HOSPITAL ENCOUNTER (EMERGENCY)
Facility: HOSPITAL | Age: 61
Discharge: HOME | End: 2021-11-18
Attending: EMERGENCY MEDICINE | Admitting: EMERGENCY MEDICINE
Payer: COMMERCIAL

## 2021-11-18 VITALS
TEMPERATURE: 98.1 F | BODY MASS INDEX: 34.52 KG/M2 | DIASTOLIC BLOOD PRESSURE: 77 MMHG | RESPIRATION RATE: 16 BRPM | HEART RATE: 76 BPM | WEIGHT: 207.2 LBS | HEIGHT: 65 IN | OXYGEN SATURATION: 98 % | SYSTOLIC BLOOD PRESSURE: 174 MMHG

## 2021-11-18 DIAGNOSIS — M54.16 LUMBAR BACK PAIN WITH RADICULOPATHY AFFECTING RIGHT LOWER EXTREMITY: ICD-10-CM

## 2021-11-18 PROCEDURE — 99281 EMR DPT VST MAYX REQ PHY/QHP: CPT

## 2021-11-18 ASSESSMENT — ENCOUNTER SYMPTOMS
DIAPHORESIS: 0
DYSURIA: 0
DIARRHEA: 0
CONSTIPATION: 0
FREQUENCY: 0
DIFFICULTY URINATING: 0
ABDOMINAL PAIN: 0
BACK PAIN: 1
COUGH: 0
FEVER: 0
WEAKNESS: 0
CHILLS: 0

## 2021-11-18 ASSESSMENT — MIFFLIN-ST. JEOR: SCORE: 1505.73

## 2021-11-18 NOTE — ED PROVIDER NOTES
EMERGENCY DEPARTMENT ENCOUNTER      NAME: Jo-Ann Jeffery  AGE: 61 year old female  YOB: 1960  MRN: [id number]  EVALUATION DATE & TIME: 11/18/2021  3:06 AM    PCP: Shell Hassan    ED PROVIDER: Ignacia Santacruz M.D.      Chief Complaint   Patient presents with     Back Pain     Hip Pain         FINAL IMPRESSION:  1. Lumbar back pain with radiculopathy affecting right lower extremity          ED COURSE & MEDICAL DECISION MAKING:    Pertinent Labs & Imaging studies reviewed. (See chart for details)  61 year old female presents to the Emergency Department for evaluation of lower back pain. Patient has a history of chronic back pain and states that her pain has worsened over the last two months. It has been progressively worsening with no acute event or trauma. She denies any numbness, tingling, new weakness. She denies urinary incontinence, urinary retention, bowel incontinence. Patient denies fever, chills, sweats. On exam, patient's pain is in the right lumbar back, no abdominal pain, symmetric strength in her lower extremities. Patient has a myriad of allergies and states she cannot take pain medications. She currently is trying physical therapy, chiropractic manipulation and use of a TENS unit. We discussed the addition of cold and heat therapy. MRI lumbar back ordered as an outpatient given this does not appear acute and there are no red flags that require emergent MRI. I discussed with her she will need to follow-up with her PMD and request they access the records for the results. I did place a referral to follow-up with the spine center for pain management.     At the conclusion of the encounter I discussed the results of all of the tests and the disposition. The questions were answered. The patient or family acknowledged understanding and was agreeable with the care plan.     3:20 AM I met with patient for initial interview and exam. PPE includes N95, protective glasses, gloves.    We  discussed the plan for discharge and the patient is agreeable. Reviewed supportive cares, symptomatic treatment, outpatient follow up, and reasons to return to the Emergency Department. Patient to be discharged by ED RN.         MEDICATIONS GIVEN IN THE EMERGENCY:  Medications - No data to display    NEW PRESCRIPTIONS STARTED AT TODAY'S ER VISIT  New Prescriptions    No medications on file        =================================================================    HPI    Patient information was obtained from: patient     Use of : N/A        Jo-Ann Jeffery is a 61 year old female with a pertinent history of type II diabetes, HLD, lumbar and cervical disc degeneration, who presents to this ED for evaluation of back pain.    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 She was told at that time that she had a herniated disc. Over the past year, patient has been doing physical therapy and seeing a chiropractor. She also has a TENS unit at home. Over the last 2 months, the pain has worsened. There was no acute or traumatic event that onset this. The pain has just progressively worsened. She describes it as starting in her right lower back and it radiates into her right groin and down her right leg. It also radiates up her back some. The pain is worse with movement or sitting for long periods of time. She cannot take any pain medications to manage her pain because she has extensive significant allergies to medications. She denies any new numbness or tingling, extremity weakness, urinary or bowel incontinence, dysuria, fever chills, diaphoresis, congestion, abdominal pain, changes in appetite, diarrhea, or any additional symptoms at this time.       REVIEW OF SYSTEMS   Review of Systems   Constitutional: Negative for chills, diaphoresis and fever.   HENT: Negative for congestion.    Respiratory: Negative for cough.    Gastrointestinal: Negative for abdominal pain,  constipation and diarrhea.   Genitourinary: Negative for difficulty urinating, dysuria, frequency and urgency.   Musculoskeletal: Positive for back pain (low, right sided that radiates to right groin and down right leg).   Neurological: Negative for weakness.        Negative for numbness or tingling   All other systems reviewed and are negative.       PAST MEDICAL HISTORY:  Past Medical History:   Diagnosis Date     Depressive disorder        PAST SURGICAL HISTORY:  Past Surgical History:   Procedure Laterality Date     ENDOSCOPIC RETROGRADE CHOLANGIOPANCREATOGRAM N/A 8/30/2021    Procedure: ENDOSCOPIC RETROGRADE CHOLANGIOPANCREATOGRAPHY WITH SPHINCTEROTOMY;  Surgeon: Stephen Rivas MD;  Location: [facility] OR           CURRENT MEDICATIONS:    No current facility-administered medications for this encounter.     Current Outpatient Medications   Medication     albuterol (PROAIR HFA/PROVENTIL HFA/VENTOLIN HFA) 108 (90 Base) MCG/ACT inhaler     calcium-magnesium (CALMAG) 500-250 MG TABS per tablet     insulin  UNIT/ML vial     multivitamin w/minerals (THERA-VIT-M) tablet     vitamin D3 (CHOLECALCIFEROL) 50 mcg (2000 units) tablet     Zinc Oxide-Vitamin C (ZINC PLUS VITAMIN C PO)         ALLERGIES:  Allergies   Allergen Reactions     Azithromycin Hives     Cephalexin Hives     Contrast Dye Shortness Of Breath     Erythromycin Hives     hives       Latex Hives     hands break out with latex gloves     Moxifloxacin Anaphylaxis     Penicillins Hives     nausea       Soap Hives     Tide soap, Soft soap, Hien Dish soap     Sulfamethoxazole-Trimethoprim Nausea     Amides      Aminoglycosides      Amitriptyline      Antipyrine-Benzocaine      Cefprozil Hives     Chocolate Flavor      Annotation: pudding       Ciprofloxacin Hives     Clindamycin Other (See Comments)     Severe diarrhea     Clonazepam      Hives       Dairy Products [Milk Protein Extract]      Tachycardia       Diphenhydramine Other (See  Comments)     Racing heart     Fluoxetine      Hives       Glipizide Other (See Comments)     Muscle pain/muscle twitching     Guaifenesin & Derivatives      Hypertension     Hydrocodone-Acetaminophen      Iodides      swells up, burns      Iron      Lactulose Nausea and Vomiting     Loperamide      Macrolides      Hives       Memantine      Metformin Headache     Methylprednisolone Nausea and Swelling     Metoclopramide      Morphine      Neomycin      ear drops-ears swell up inside     Prochlorperazine      Pseudoephedrine-Guaifenesin Cr      Quinolones      Other reaction       Sodium Chloride Swelling     Strawberry (Diagnostic) Swelling     Sulfa Drugs      Tartrazine Difficulty breathing     Acetic Acid Rash     Antipyrine Rash     Food Palpitations     Chocolate and butterscottch, strawberries, walnuts     Imidazole Antifungals Rash and Unknown     Nuts Palpitations     Chocolate and butterscottch, strawberries, walnuts       FAMILY HISTORY:  Family History   Problem Relation Age of Onset     Syncope Mother      Hypertension Mother      Cerebrovascular Disease Father 62     Diabetes Father      Cerebrovascular Disease Brother 63     Gallbladder Disease Maternal Grandmother      Breast Cancer Maternal Grandmother      Gallbladder Disease Maternal Aunt      Throat cancer Maternal Aunt        SOCIAL HISTORY:   Social History     Socioeconomic History     Marital status:      Spouse name: Not on file     Number of children: Not on file     Years of education: Not on file     Highest education level: Not on file   Occupational History     Not on file   Tobacco Use     Smoking status: Never Smoker     Smokeless tobacco: Never Used   Substance and Sexual Activity     Alcohol use: Not Currently     Drug use: Not on file     Sexual activity: Not on file   Other Topics Concern     Not on file   Social History Narrative     Not on file     Social Determinants of Health     Financial Resource Strain: Not on file  "  Food Insecurity: Not on file   Transportation Needs: Not on file   Physical Activity: Not on file   Stress: Not on file   Social Connections: Not on file   Intimate Partner Violence: Not on file   Housing Stability: Not on file       VITALS:  BP (!) 174/77   Pulse 76   Temp 98.1  F (36.7  C) (Oral)   Resp 16   Ht 1.651 m (5' 5\")   Wt 94 kg (207 lb 3.2 oz)   SpO2 98%   Breastfeeding No   BMI 34.48 kg/m      PHYSICAL EXAM    Constitutional: Well developed, Well nourished, NAD  HENT: Normocephalic, Atraumatic, Bilateral external ears normal, Oropharynx normal, mucous membranes moist, Nose normal. Neck- Normal range of motion, No tenderness, Supple, No stridor.   Eyes: PERRL, EOMI, Conjunctiva normal, No discharge.   Respiratory: Normal breath sounds, No respiratory distress, No wheezing, Speaks full sentences easily. No cough.    Cardiovascular: Normal heart rate, Regular rhythm, Chest wall nontender.    GI: Bowel sounds normal, Soft, No tenderness, No masses, No flank tenderness. No rebound or guarding.    Musculoskeletal: Tenderness in right lumbar region, no midline spinal tenderness. Palpable DP pulses. No edema.  No cyanosis, No clubbing. Good range of motion in all major joints. No tenderness to palpation or major deformities noted. No tenderness of the  Integument: Warm, Dry, No erythema, No rash. No petechiae.   Neurologic: Alert & oriented x 3, Normal motor function, Normal sensory function, No focal deficits noted.    Psychiatric: Affect normal, Judgment normal, Mood normal. Cooperative.        I, Alexia Miramontes, am serving as a scribe to document services personally performed by Dr. Santacruz based on my observation and the provider's statements to me. I, Ignacia Santacruz MD attest that Alexia Miramontes is acting in a scribe capacity, has observed my performance of the services and has documented them in accordance with my direction.    Ignacia Santacruz M.D.  Emergency Medicine  [facility] EMERGENCY DEPARTMENT  [address] [place] [id number]  [phone]  Dept: [phone]     Ignacia Santacruz MD  11/18/21 7462

## 2021-11-18 NOTE — ED TRIAGE NOTES
Pt reports onset of back pain on December of last year. Pt has been doing physical therapy and has been seeing a chiropractor without relief. Pt reports an episode of back pain that started last Friday. She went in and got adjusted Monday twice. Pain is on the right lower back and radiates to the right hip and groin area. Reports some tingling in the right leg.

## 2021-11-18 NOTE — DISCHARGE INSTRUCTIONS
Please call MRI scheduling in the morning at [phone] to schedule your MRI of your lower back and follow-up with your primary care provider

## 2021-12-19 ENCOUNTER — HEALTH MAINTENANCE LETTER (OUTPATIENT)
Age: 61
End: 2021-12-19

## 2022-04-10 ENCOUNTER — HEALTH MAINTENANCE LETTER (OUTPATIENT)
Age: 62
End: 2022-04-10

## 2022-07-31 ENCOUNTER — HEALTH MAINTENANCE LETTER (OUTPATIENT)
Age: 62
End: 2022-07-31

## 2022-08-27 ENCOUNTER — HOSPITAL ENCOUNTER (EMERGENCY)
Facility: HOSPITAL | Age: 62
Discharge: HOME | End: 2022-08-27
Attending: EMERGENCY MEDICINE | Admitting: EMERGENCY MEDICINE
Payer: COMMERCIAL

## 2022-08-27 ENCOUNTER — APPOINTMENT (OUTPATIENT)
Dept: RADIOLOGY | Facility: HOSPITAL | Age: 62
End: 2022-08-27
Attending: EMERGENCY MEDICINE
Payer: COMMERCIAL

## 2022-08-27 ENCOUNTER — APPOINTMENT (OUTPATIENT)
Dept: CT IMAGING | Facility: HOSPITAL | Age: 62
End: 2022-08-27
Attending: EMERGENCY MEDICINE
Payer: COMMERCIAL

## 2022-08-27 VITALS
BODY MASS INDEX: 40.12 KG/M2 | OXYGEN SATURATION: 96 % | SYSTOLIC BLOOD PRESSURE: 146 MMHG | HEART RATE: 64 BPM | HEIGHT: 62 IN | RESPIRATION RATE: 16 BRPM | DIASTOLIC BLOOD PRESSURE: 67 MMHG | WEIGHT: 218 LBS | TEMPERATURE: 98.2 F

## 2022-08-27 DIAGNOSIS — R10.13 ABDOMINAL PAIN, EPIGASTRIC: ICD-10-CM

## 2022-08-27 LAB
ALBUMIN SERPL-MCNC: 3.6 G/DL (ref 3.5–5)
ALP SERPL-CCNC: 69 U/L (ref 45–120)
ALT SERPL W P-5'-P-CCNC: 21 U/L (ref 0–45)
ANION GAP SERPL CALCULATED.3IONS-SCNC: 8 MMOL/L (ref 5–18)
AST SERPL W P-5'-P-CCNC: 23 U/L (ref 0–40)
ATRIAL RATE - MUSE: 69 BPM
BASOPHILS # BLD AUTO: 0 10E3/UL (ref 0–0.2)
BASOPHILS NFR BLD AUTO: 0 %
BILIRUB DIRECT SERPL-MCNC: 0.1 MG/DL
BILIRUB SERPL-MCNC: 0.4 MG/DL (ref 0–1)
BUN SERPL-MCNC: 15 MG/DL (ref 8–22)
CALCIUM SERPL-MCNC: 9.2 MG/DL (ref 8.5–10.5)
CHLORIDE BLD-SCNC: 104 MMOL/L (ref 98–107)
CO2 SERPL-SCNC: 27 MMOL/L (ref 22–31)
CREAT SERPL-MCNC: 0.81 MG/DL (ref 0.6–1.1)
DIASTOLIC BLOOD PRESSURE - MUSE: NORMAL MMHG
EOSINOPHIL # BLD AUTO: 0.1 10E3/UL (ref 0–0.7)
EOSINOPHIL NFR BLD AUTO: 1 %
ERYTHROCYTE [DISTWIDTH] IN BLOOD BY AUTOMATED COUNT: 13.2 % (ref 10–15)
GFR SERPL CREATININE-BSD FRML MDRD: 82 ML/MIN/1.73M2
GLUCOSE BLD-MCNC: 160 MG/DL (ref 70–125)
HCT VFR BLD AUTO: 35.8 % (ref 35–47)
HGB BLD-MCNC: 11.5 G/DL (ref 11.7–15.7)
HOLD SPECIMEN: NORMAL
IMM GRANULOCYTES # BLD: 0 10E3/UL
IMM GRANULOCYTES NFR BLD: 0 %
INTERPRETATION ECG - MUSE: NORMAL
LIPASE SERPL-CCNC: 19 U/L (ref 0–52)
LYMPHOCYTES # BLD AUTO: 3.2 10E3/UL (ref 0.8–5.3)
LYMPHOCYTES NFR BLD AUTO: 39 %
MCH RBC QN AUTO: 27.8 PG (ref 26.5–33)
MCHC RBC AUTO-ENTMCNC: 32.1 G/DL (ref 31.5–36.5)
MCV RBC AUTO: 87 FL (ref 78–100)
MONOCYTES # BLD AUTO: 0.4 10E3/UL (ref 0–1.3)
MONOCYTES NFR BLD AUTO: 5 %
NEUTROPHILS # BLD AUTO: 4.3 10E3/UL (ref 1.6–8.3)
NEUTROPHILS NFR BLD AUTO: 55 %
NRBC # BLD AUTO: 0 10E3/UL
NRBC BLD AUTO-RTO: 0 /100
P AXIS - MUSE: 80 DEGREES
PLATELET # BLD AUTO: 279 10E3/UL (ref 150–450)
POTASSIUM BLD-SCNC: 4.4 MMOL/L (ref 3.5–5)
PR INTERVAL - MUSE: 182 MS
PROT SERPL-MCNC: 8 G/DL (ref 6–8)
QRS DURATION - MUSE: 86 MS
QT - MUSE: 382 MS
QTC - MUSE: 409 MS
R AXIS - MUSE: 0 DEGREES
RBC # BLD AUTO: 4.14 10E6/UL (ref 3.8–5.2)
SODIUM SERPL-SCNC: 139 MMOL/L (ref 136–145)
SYSTOLIC BLOOD PRESSURE - MUSE: NORMAL MMHG
T AXIS - MUSE: 16 DEGREES
TROPONIN I SERPL-MCNC: 0.02 NG/ML (ref 0–0.29)
VENTRICULAR RATE- MUSE: 69 BPM
WBC # BLD AUTO: 8 10E3/UL (ref 4–11)

## 2022-08-27 PROCEDURE — 74176 CT ABD & PELVIS W/O CONTRAST: CPT

## 2022-08-27 PROCEDURE — 80053 COMPREHEN METABOLIC PANEL: CPT | Performed by: EMERGENCY MEDICINE

## 2022-08-27 PROCEDURE — 36415 COLL VENOUS BLD VENIPUNCTURE: CPT | Performed by: EMERGENCY MEDICINE

## 2022-08-27 PROCEDURE — 85025 COMPLETE CBC W/AUTO DIFF WBC: CPT | Performed by: EMERGENCY MEDICINE

## 2022-08-27 PROCEDURE — 82248 BILIRUBIN DIRECT: CPT | Performed by: EMERGENCY MEDICINE

## 2022-08-27 PROCEDURE — 83690 ASSAY OF LIPASE: CPT | Performed by: EMERGENCY MEDICINE

## 2022-08-27 PROCEDURE — 71046 X-RAY EXAM CHEST 2 VIEWS: CPT

## 2022-08-27 PROCEDURE — 84484 ASSAY OF TROPONIN QUANT: CPT | Performed by: EMERGENCY MEDICINE

## 2022-08-27 PROCEDURE — 99285 EMERGENCY DEPT VISIT HI MDM: CPT | Mod: 25

## 2022-08-27 PROCEDURE — 93005 ELECTROCARDIOGRAM TRACING: CPT | Performed by: EMERGENCY MEDICINE

## 2022-08-27 ASSESSMENT — ENCOUNTER SYMPTOMS
NAUSEA: 1
FEVER: 0
SHORTNESS OF BREATH: 0
SORE THROAT: 0
EYES NEGATIVE: 1
HEMATURIA: 0
CHILLS: 0
BLOOD IN STOOL: 0
ABDOMINAL PAIN: 1

## 2022-08-27 NOTE — ED TRIAGE NOTES
Triage Assessment     Row Name 08/27/22 0638       Triage Assessment (Adult)    Airway WDL WDL       Respiratory WDL    Respiratory WDL WDL       Skin Circulation/Temperature WDL    Skin Circulation/Temperature WDL WDL       Cardiac WDL    Cardiac WDL WDL       Peripheral/Neurovascular WDL    Peripheral Neurovascular WDL WDL       Cognitive/Neuro/Behavioral WDL    Cognitive/Neuro/Behavioral WDL WDL       Pearl Coma Scale    Best Eye Response 4-->(E4) spontaneous    Best Motor Response 6-->(M6) obeys commands    Best Verbal Response 5-->(V5) oriented    Rin Coma Scale Score 15

## 2022-08-27 NOTE — ED TRIAGE NOTES
" She states she woke up and threw up several times, after eating dinner and going to bed. She got up at 1AM to have BM, was straining, and felt nauseous. She was able to have BM. She states she has HX of pancreatitis, pain a 5. She denies CP or SOB. She had a chiropractor adjustment on Wednesday, and \"my stomach has been burning since\". No interventions at home.      Triage Assessment     Row Name 08/27/22 0638       Triage Assessment (Adult)    Airway WDL WDL       Respiratory WDL    Respiratory WDL WDL       Skin Circulation/Temperature WDL    Skin Circulation/Temperature WDL WDL       Cardiac WDL    Cardiac WDL WDL       Peripheral/Neurovascular WDL    Peripheral Neurovascular WDL WDL       Cognitive/Neuro/Behavioral WDL    Cognitive/Neuro/Behavioral WDL WDL       Rin Coma Scale    Best Eye Response 4-->(E4) spontaneous    Best Motor Response 6-->(M6) obeys commands    Best Verbal Response 5-->(V5) oriented    Hamilton Coma Scale Score 15              "

## 2022-08-27 NOTE — ED PROVIDER NOTES
EMERGENCY DEPARTMENT ENCOUNTER      NAME: Jo-Ann Jeffery  AGE: 62 year old female  YOB: 1960  MRN: [id number]  EVALUATION DATE & TIME: No admission date for patient encounter.    PCP: Shell Hassan    ED PROVIDER: Augustina Leach M.D.      CHIEF COMPLAINT     Chief Complaint   Patient presents with     Abdominal Pain         FINAL IMPRESSION:     1. Abdominal pain, epigastric          MEDICAL DECISION MAKING:       Pertinent Labs & Imaging studies reviewed. (See chart for details)    62 year old female presents to the Emergency Department for evaluation of upper abdominal pain.    ED Course as of 08/27/22 1119   Sat Aug 27, 2022   0731 Mrs. Jeffery 62-year-old female presents complaining of upper abdominal pain.   0731 Noticed that on Wednesday she went to her chiropractor her upper thoracic spine not in neck adjusted she states she immediately felt short of breath.  She went back he adjusted her again the shortness of when went away but then she started having upper abdominal pain.   0732 No stools but she says because she is a lot of vegetables and fruit no gross blood or melena.  N Some nausea.  Has been having   0732 previosu history and alcoholic pancreatitis had an MRCP.   0732 Currently the pain is a 4 out of 10.  Denies any chest pain denies any shortness of breath denies any back pain no lower extremity edema no recent travel no history of thromboembolism.   0733 Patient states she has multiple arteries declines anything for pain or nausea.   0733 evaluation she is well-appearing in no distress smiling and laughing often.  Epigastric tenderness palpation no guarding or rebound.   0733 Differential diagnosis of upper abdominal pain associated with increased frequency of stooling and some nausea include but not limited to biliary colic cholecystitis pancreatitis   0734 He reports sudden onset of shortness of breath after her adjustment she went back and she was readjusted and she said to have  gotten better no shortness of breath or chest pain since Wednesday.  We will do a chest x-ray.  Entertain the possibility of PE.  I think is less likely she has no risk factors currently denies any chest pain or shortness of breath.   0925 Lipase normal normal liver function test normal white blood cell count.  Troponin 0.02.   0926 Anion Gap: 8   0950 Updated on lab findings she is very happy that her lipase is normal.  Given the report of pain on the chest with manipulation now completely 1 we will do a chest x-ray and we will do a CT abdomen without contrast.   1102 Chest x-ray no acute.   1108 Clinical impression and decision making 62-year-old female presents complaining of upper abdominal pain symptoms have been present since her chiropractor adjusted her.  On Wednesday after the adjustment she had shortness of breath.  She got readjusted in bed became better she is concerned may be a rib or something adjustment past abdominal pain on exam she is well-appearing in no distress.  No chest pain no shortness of breath low suspicion for acute coronary syndrome PE was also entertained she is a she is completely asymptomatic has no risk factors for PE no history of DVT does not take hormone does not smoke no recent travel.  Abdomen is benign she had a previous history of pancreatitis secondary to choledocholithiasis.  Lipase is normal liver function test normal.  CT without contrast no evidence of obstruction.  Previous air from procedure.  She is quite reliable feels comfortable going home encouraged her to follow-up primary care doctor and return for any concerns.     Vital Signs: Hypertension  EKG: Normal sinus rhythm poor anterior progression normal axis  Imaging: X-ray CT abdomen pelvis no acute  Home Meds: reviewed  ED meds/abx: none  Fluids: oral    Labs  K 4.4  Cr 0.81  Wbc 8  Hgb 11.5  [id number]  Troponin 0.02      Review of Previous Records  Discharge summary 2021    Jo-Ann Jeffery is a 61 year old  female admitted on 8/25/2021. She has history of diabetes, lumbar disc disease, osteoarthritis, 47 medication allergies presents for evaluation of abdominal pain found to have pancreatitis.     #Acute pancreatitis  Ultimately found to be likely due to biliary obstruction due to sludge.  She was seen by GI, underwent ERCP and sludge was swept from the bile duct and sphincterotomy was performed.  Liver enzymes have been trending up and now down after procedure.  Symptomatically she is feeling better and tolerating oral diet.  Seen by general surgeon and cholecystectomy is recommended and will be done later as an outpatient.  Would recheck liver enzymes in perhaps a month to ensure that they normalize.     #?Allergic reaction  Following ERCP, patient complained of acute shortness of breath.  She attributed this to mold in her hospital room and asked to change rooms.  This morning when I see her she sounded acutely hoarse and complained she was feeling terrible.  However over the course of our conversation her hoarseness seemed to spontaneously resolve and she indicated that she wanted to go home.  No wheezing on auscultation, sounds all seemed to be upper airway in nature.  Do not think presentation this morning represents bronchospasm.  I did not examine her last night.  Patient does have a certain degree of anxiety about her numerous allergies.     #HLD  Patient has not been taking her simvastatin anyway and so I did remove this from her med list.    Consults      ED COURSE   7:20 AM I met the patient and performed my initial interview and exam. Patient seen in Triage.    7:54 AM reevaluated and updated.    9:27 AM reevaluated and updated    11:03 AM reevaluated and updated.  Tolerating water.  No vomiting.  Feels comfortable going home.    At the conclusion of the encounter I discussed the results of all of the tests and the disposition. The questions were answered. The patient acknowledged understanding and was  "agreeable with the care plan.           MEDICATIONS GIVEN IN THE EMERGENCY:   Medications - No data to display    NEW PRESCRIPTIONS STARTED AT TODAY'S ER VISIT     New Prescriptions    No medications on file          =================================================================    HPI     Patient information was obtained from: patient     Use of : N/A         Jo-Ann Jeffery is a 62 year old female who presents by private vehicle at 6:40 AM for evaluation of abdominal pain.     The patient had a chiropractic adjustment of her mid-back on Wednesday, 8/24/22. She reports that when she arrived home, she \"couldn't breathe\". She says the chiropractor \"threw a rib\" during the adjustment. The patient was then re-adjusted by the chiropractor which relieved her pain with breathing. She says that following the readjustment, she immediately noticed upper abdominal pain which was burning/thobbing in quality. The pain eventually subsided on its own and the patient was able to eat normally last night. At ~12:45 AM today, however, she awoke as her upper abdominal pain had returned. At this time, she rates her pain 4/10 in severity. She says she has also been having more frequent BMs than normal. She says pooping does relieve her abdominal pain.    The patient endorses upper abdominal pain, nausea and increased BM frequency. She denies any blood in her stool or urine, fevers, chills, eye problems, sore throat, chest pain, or shortness of breath.    PMHx: Pancreatitis      REVIEW OF SYSTEMS   Review of Systems   Constitutional: Negative for chills and fever.   HENT: Negative for sore throat.    Eyes: Negative.    Respiratory: Negative for shortness of breath.    Cardiovascular: Negative for chest pain.   Gastrointestinal: Positive for abdominal pain (upper) and nausea. Negative for blood in stool.        Positive for increased BM frequency.   Genitourinary: Negative for hematuria.   All other systems reviewed and are " negative.       PAST MEDICAL HISTORY:     Past Medical History:   Diagnosis Date     Depressive disorder        PAST SURGICAL HISTORY:     Past Surgical History:   Procedure Laterality Date     ENDOSCOPIC RETROGRADE CHOLANGIOPANCREATOGRAM N/A 8/30/2021    Procedure: ENDOSCOPIC RETROGRADE CHOLANGIOPANCREATOGRAPHY WITH SPHINCTEROTOMY;  Surgeon: Stephen Rivas MD;  Location: [facility] Main OR         CURRENT MEDICATIONS:   albuterol (PROAIR HFA/PROVENTIL HFA/VENTOLIN HFA) 108 (90 Base) MCG/ACT inhaler  calcium-magnesium (CALMAG) 500-250 MG TABS per tablet  insulin  UNIT/ML vial  multivitamin w/minerals (THERA-VIT-M) tablet  vitamin D3 (CHOLECALCIFEROL) 50 mcg (2000 units) tablet  Zinc Oxide-Vitamin C (ZINC PLUS VITAMIN C PO)         ALLERGIES:     Allergies   Allergen Reactions     Azithromycin Hives     Cephalexin Hives     Contrast Dye Shortness Of Breath     Erythromycin Hives     hives       Latex Hives     hands break out with latex gloves     Moxifloxacin Anaphylaxis     Penicillins Hives     nausea       Soap Hives     Tide soap, Soft soap, Hien Dish soap     Sulfamethoxazole-Trimethoprim Nausea     Amides      Aminoglycosides      Amitriptyline      Antipyrine-Benzocaine      Cefprozil Hives     Chocolate Flavor      Annotation: pudding       Ciprofloxacin Hives     Clindamycin Other (See Comments)     Severe diarrhea     Clonazepam      Hives       Dairy Products [Milk Protein Extract]      Tachycardia       Diphenhydramine Other (See Comments)     Racing heart     Fluoxetine      Hives       Glipizide Other (See Comments)     Muscle pain/muscle twitching     Guaifenesin & Derivatives      Hypertension     Hydrocodone-Acetaminophen      Iodides      swells up, burns      Iron      Lactulose Nausea and Vomiting     Loperamide      Macrolides      Hives       Memantine      Metformin Headache     Methylprednisolone Nausea and Swelling     Metoclopramide      Morphine      Neomycin      ear  "drops-ears swell up inside     Prochlorperazine      Pseudoephedrine-Guaifenesin Cr      Quinolones      Other reaction       Sodium Chloride Swelling     Strawberry (Diagnostic) Swelling     Sulfa Drugs      Tartrazine Difficulty breathing     Acetic Acid Rash     Antipyrine Rash     Food Palpitations     Chocolate and butterscottch, strawberries, walnuts     Imidazole Antifungals Rash and Unknown     Nuts Palpitations     Chocolate and butterscottch, strawberries, walnuts       FAMILY HISTORY:     Family History   Problem Relation Age of Onset     Syncope Mother      Hypertension Mother      Cerebrovascular Disease Father 62     Diabetes Father      Cerebrovascular Disease Brother 63     Gallbladder Disease Maternal Grandmother      Breast Cancer Maternal Grandmother      Gallbladder Disease Maternal Aunt      Throat cancer Maternal Aunt        SOCIAL HISTORY:     Social History     Socioeconomic History     Marital status:    Tobacco Use     Smoking status: Never Smoker     Smokeless tobacco: Never Used   Substance and Sexual Activity     Alcohol use: Not Currently       VITALS:   BP (!) 172/63   Pulse 81   Temp 98.2  F (36.8  C) (Temporal)   Resp 20   Ht 1.575 m (5' 2\")   Wt 98.9 kg (218 lb)   SpO2 96%   BMI 39.87 kg/m      PHYSICAL EXAM     Physical Exam  Vitals and nursing note reviewed. Exam conducted with a chaperone present.   Constitutional:       Appearance: She is well-developed.   Skin:     General: Skin is warm and dry.   Neurological:      Mental Status: She is alert.      Cranial Nerves: No cranial nerve deficit.         Physical Exam   Constitutional: Well-appearing, cooperative, pleasant, no distress.    Head: Atraumatic.     Nose: Nose normal.     Mouth/Throat: Oropharynx is clear and moist.     Eyes: EOM are normal. Pupils are equal, round, and reactive to light.     Ears: Bilateral pearly white TMs.    Neck: Normal range of motion. Neck supple.     Cardiovascular: Normal rate, " regular rhythm and normal heart sounds.      Pulmonary/Chest: Normal effort  and breath sounds normal.     Abdominal: Upper abdominal tenderness to palpation.    Musculoskeletal: Normal range of motion.     Neurological: No deficits.    Lymphatics: No edema.    : Normal female anatomy.    Skin: Skin is warm and dry.     Psychiatric: Normal mood and affect. Behavior is normal.       LAB:     All pertinent labs reviewed and interpreted.  Labs Ordered and Resulted from Time of ED Arrival to Time of ED Departure   BASIC METABOLIC PANEL - Abnormal       Result Value    Sodium 139      Potassium 4.4      Chloride 104      Carbon Dioxide (CO2) 27      Anion Gap 8      Urea Nitrogen 15      Creatinine 0.81      Calcium 9.2      Glucose 160 (*)     GFR Estimate 82     CBC WITH PLATELETS AND DIFFERENTIAL - Abnormal    WBC Count 8.0      RBC Count 4.14      Hemoglobin 11.5 (*)     Hematocrit 35.8      MCV 87      MCH 27.8      MCHC 32.1      RDW 13.2      Platelet Count 279      % Neutrophils 55      % Lymphocytes 39      % Monocytes 5      % Eosinophils 1      % Basophils 0      % Immature Granulocytes 0      NRBCs per 100 WBC 0      Absolute Neutrophils 4.3      Absolute Lymphocytes 3.2      Absolute Monocytes 0.4      Absolute Eosinophils 0.1      Absolute Basophils 0.0      Absolute Immature Granulocytes 0.0      Absolute NRBCs 0.0     HEPATIC FUNCTION PANEL - Normal    Bilirubin Total 0.4      Bilirubin Direct 0.1      Protein Total 8.0      Albumin 3.6      Alkaline Phosphatase 69      AST 23      ALT 21     LIPASE - Normal    Lipase 19     TROPONIN I - Normal    Troponin I 0.02          RADIOLOGY:     Reviewed all pertinent imaging. Please see official radiology report.  Chest XR,  PA & LAT   Preliminary Result   IMPRESSION: Negative chest. Lungs clear. No pneumothorax. No pleural effusions.         CT Abdomen Pelvis w/o Contrast   Final Result   IMPRESSION:    1.  No acute findings or CT evidence for source of  symptoms.   2.  Small volume pneumobilia consistent with prior sphincterotomy/ERCP.   3.  Hepatic steatosis.   4.  Possible small fibroids.              EKG:     EKG #1  Normal sinus rhythm poor anterior progression normal axis inverted T wave in lead III and V1 which are normal.    Time:[id number]  Ventricular rate 69 bmp  Axis normal  HI interval 182 ms  QRS duration 86 ms  QT//409 ms    Compared to previous EKG on compared to EKG on May 2, 2006 heart rate was 105 then.  Inverted T wave in 3 was seen before.  Poor anterior progression is new.  I have independently reviewed and interpreted the EKG(s) documented above.      PROCEDURES:     Procedures      I, Carole Damon, am serving as a scribe to document services personally performed by Dr. Leach based on my observation and the provider's statements to me. I, Augustina Leach MD attest that Carole Damon is acting in a scribe capacity, has observed my performance of the services and has documented them in accordance with my direction.    Augustina Leach M.D.  Emergency Medicine  [facility] EMERGENCY DEPARTMENT  [address] [place] [id number] [phone]  Dept: [phone]     Augustina Leach MD  08/27/22 8358

## 2022-08-27 NOTE — DISCHARGE INSTRUCTIONS
Read and follow the discharge instructions.    Your chest x-ray and your abdomen were normal.    The blood work that checks your pancreas    No abnormalities were seen today.  Because of diet do call your primary care provider on Monday to make a follow-up appointment.    Return immediately or call 911 if you have worsening symptoms chest pain shortness of breath or any other concerns.

## 2022-10-15 ENCOUNTER — HEALTH MAINTENANCE LETTER (OUTPATIENT)
Age: 62
End: 2022-10-15

## 2022-11-25 ENCOUNTER — HOSPITAL ENCOUNTER (EMERGENCY)
Facility: CLINIC | Age: 62
Discharge: HOME | End: 2022-11-25
Admitting: PHYSICIAN ASSISTANT
Payer: COMMERCIAL

## 2022-11-25 ENCOUNTER — APPOINTMENT (OUTPATIENT)
Dept: RADIOLOGY | Facility: CLINIC | Age: 62
End: 2022-11-25
Attending: PHYSICIAN ASSISTANT
Payer: COMMERCIAL

## 2022-11-25 VITALS
DIASTOLIC BLOOD PRESSURE: 103 MMHG | HEART RATE: 82 BPM | OXYGEN SATURATION: 98 % | BODY MASS INDEX: 34.97 KG/M2 | SYSTOLIC BLOOD PRESSURE: 194 MMHG | RESPIRATION RATE: 20 BRPM | WEIGHT: 217.6 LBS | TEMPERATURE: 98.6 F | HEIGHT: 66 IN

## 2022-11-25 DIAGNOSIS — M75.42 IMPINGEMENT SYNDROME OF LEFT SHOULDER: ICD-10-CM

## 2022-11-25 DIAGNOSIS — M25.512 ACUTE PAIN OF LEFT SHOULDER: ICD-10-CM

## 2022-11-25 PROCEDURE — 73030 X-RAY EXAM OF SHOULDER: CPT | Mod: LT

## 2022-11-25 PROCEDURE — 71101 X-RAY EXAM UNILAT RIBS/CHEST: CPT | Mod: LT

## 2022-11-25 PROCEDURE — 99284 EMERGENCY DEPT VISIT MOD MDM: CPT

## 2022-11-25 ASSESSMENT — ENCOUNTER SYMPTOMS
ARTHRALGIAS: 1
SHORTNESS OF BREATH: 0
FEVER: 0
WEAKNESS: 0
ABDOMINAL PAIN: 0
FATIGUE: 0
CHILLS: 0
LIGHT-HEADEDNESS: 0
DIZZINESS: 0
NUMBNESS: 0

## 2022-11-25 NOTE — DISCHARGE INSTRUCTIONS
You are seen here in the emergency department for evaluation of left shoulder pain.  Your x-rays are normal.  No evidence of any rib fractures, or subluxation.  I suspect this is all musculoskeletal.  I recommend that you continue with your pain management at home with ibuprofen.  Additionally you can trial some ice, topical medications such as lidocaine over the area.  If you continue to have pain in that area, I recommend you follow-up with orthopedics.  The number for [facility] include above.  He develop any new or worsening symptoms such as numbness or tingling, weakness in the arm, please return to the emergency department for further evaluation.    For pain or fever you may use:  -Tylenol 650 mg every 6 hours.  Max 4000 mg in 24 hours  Do not use thismedication with alcohol as it can cause liver problems.  -Ibuprofen 600 mg every 6 hours.  Max 3500 mg in 24 hours  Do not take this medication if you have a history of a GI bleed or have kidney problems.  You may use both of these medications at the same time or you can alternate them every 3 hours.  For example, Tylenol at 6 AM, ibuprofen at 9 AM, Tylenol at noon, etc.

## 2022-11-25 NOTE — ED PROVIDER NOTES
EMERGENCY DEPARTMENT ENCOUNTER      NAME: Jo-Ann Jeffery  AGE: 62 year old female  YOB: 1960  MRN: [id number]  EVALUATION DATE & TIME: No admission date for patient encounter.    PCP: Shell Hassan    ED PROVIDER: Schuyler Hayward PA-C      Chief Complaint   Patient presents with     Arm Pain     Since Monday       FINAL IMPRESSION:  1. Acute pain of left shoulder    2. Impingement syndrome of left shoulder      ED COURSE & MEDICAL DECISION MAKING:    Pertinent Labs & Imaging studies reviewed. (See chart for details)  11:34 AM I met the patient and performed my initial interview and exam.   1222 PM Plan for discharge.     62 year old female presents to the Emergency Department for evaluation of left shoulder pain.     ED Course as of 11/25/22 1721   Fri Nov 25, 2022   1147 Is a 62-year-old female, notes that she was on the floor doing some exercises Monday, felt a pop in her shoulder.  She then saw the chiropractor on Wednesday for left-sided shoulder pain, notes that she had an adjustment of her shoulder, however her back, and then had increased pain since then.  Patient has 48 listed allergies, notes that she can only take 1 or 2 tablets of Advil a day otherwise she has an allergic reaction.  She has not taken any other pain medications.  She has been trying ice, heat.  She has no numbness or tingling in the hand, good strength and sensation, pulses intact to both upper extremities.  No swelling of neck.  Range of motion of the neck is intact.  Low suspicion for any cervical damage, or acute vascular dissection or damage from chiropractors adjustment.  Given that she has numerous allergies, unable to give her much for pain here.  We will plan for x-rays of the shoulder, as well as the x-ray of the left ribs.  On examination she has diffuse muscular tenderness over the anterior portion of the chest, the lateral portion of her shoulder, as well as the posterior aspect of the left scapula.   Diffusely tender.  No area of focal tenderness, some muscular spasms.  No midline tenderness.  Good strength and sensation to upper and lower extremities bilaterally.  Shoulders appear even, no evidence of dislocation.  Plan for x-ray imaging of the chest and shoulder, plan for reevaluation after imaging.   1222 XR Shoulder Left 2 Views  X-ray of the shoulder does not show any acute fracture, or subluxation of the ribs.  X-ray of the ribs does not show any acute rib fractures, normal lung windows.   1222 Given the negative x-rays, and her examination, suspect this is likely musculoskeletal.  Recommend symptomatic treatment with ibuprofen, rest, ice.  If patient persistently has symptoms, she should follow-up with orthopedics.  Discussed this plan with her.  She is agreeable.  Plan for discharge.       Medical Decision Making    History:    Supplemental history from: N/A    External Record(s) reviewed: Documented in HPI, if applicable.    Work Up:    Chart documentation includes differential considered and any EKGs or imaging interpreted by provider.    In additional to work up documented, I considered the following work up: See chart documentation, if applicable.    External consultation:    Discussion of management with another provider: See chart documentation, if applicable    Complicating factors:    Care impacted by chronic illness: None    Care affected by social determinants of health: N/A    Disposition considerations: Discharge. No recommendations on prescription strength medication(s). I did not consider admission.         At the conclusion of the encounter I discussed the results of all of the tests and the disposition. The questions were answered. The patient or family acknowledged understanding and was agreeable with the care plan.       0 minutes of critical care time     MEDICATIONS GIVEN IN THE EMERGENCY:  Medications - No data to display    NEW PRESCRIPTIONS STARTED AT TODAY'S ER VISIT  Discharge  "Medication List as of 11/25/2022 12:37 PM             =================================================================    HPI    Patient information was obtained from: Patient     Use of : N/A       Jo-Ann Jeffery is a 62 year old female with a pertinent history of type 2 diabetes, anxiety, obesity, alcohol dependence in remission, lumbosacral spondylosis, who presents to this ED for evaluation of left shoulder pain.  Patient notes that she was on the floor initially on Monday, doing some shoulder exercises, rolled over on her left shoulder, and felt a \"pop\".  She then went to the chiropractor on Wednesday, had an adjustment of her shoulder, where they pressed on the scapular region, as well as on the top of her clavicle, and then has had pain since then.  She notices a shooting pain that runs down her hand.  Is able to move her neck without difficulty.  No changes in vision.  No numbness or tingling.  No dizziness or lightheadedness.  No chest pain or shortness of breath.  Has reproducible tenderness to the left shoulder.  Notes it is difficult to move it.  Notes that she can only take 1-2 doses of Advil a day because she \"is allergic to everything else\".  Has trying this, heat over the area.    REVIEW OF SYSTEMS   Review of Systems   Constitutional: Negative for chills, fatigue and fever.   Respiratory: Negative for shortness of breath.    Cardiovascular: Negative for chest pain.   Gastrointestinal: Negative for abdominal pain.   Musculoskeletal: Positive for arthralgias.   Neurological: Negative for dizziness, weakness, light-headedness and numbness.   All other systems reviewed and are negative.      PAST MEDICAL HISTORY:  Past Medical History:   Diagnosis Date     Depressive disorder        PAST SURGICAL HISTORY:  Past Surgical History:   Procedure Laterality Date     ENDOSCOPIC RETROGRADE CHOLANGIOPANCREATOGRAM N/A 8/30/2021    Procedure: ENDOSCOPIC RETROGRADE CHOLANGIOPANCREATOGRAPHY WITH " SPHINCTEROTOMY;  Surgeon: Stephen Rivas MD;  Location: [facility] Main OR           CURRENT MEDICATIONS:    albuterol (PROAIR HFA/PROVENTIL HFA/VENTOLIN HFA) 108 (90 Base) MCG/ACT inhaler  calcium-magnesium (CALMAG) 500-250 MG TABS per tablet  insulin  UNIT/ML vial  multivitamin w/minerals (THERA-VIT-M) tablet  vitamin D3 (CHOLECALCIFEROL) 50 mcg (2000 units) tablet  Zinc Oxide-Vitamin C (ZINC PLUS VITAMIN C PO)         ALLERGIES:  Allergies   Allergen Reactions     Azithromycin Hives     Cephalexin Hives     Contrast Dye Shortness Of Breath     Erythromycin Hives     hives       Latex Hives     hands break out with latex gloves     Moxifloxacin Anaphylaxis     Penicillins Hives     nausea       Soap Hives     Tide soap, Soft soap, Hien Dish soap     Sulfamethoxazole-Trimethoprim Nausea     Amides      Aminoglycosides      Amitriptyline      Antipyrine-Benzocaine      Cefprozil Hives     Chocolate Flavor      Annotation: pudding       Ciprofloxacin Hives     Clindamycin Other (See Comments)     Severe diarrhea     Clonazepam      Hives       Dairy Products [Milk Protein Extract]      Tachycardia       Diphenhydramine Other (See Comments)     Racing heart     Fluoxetine      Hives       Glipizide Other (See Comments)     Muscle pain/muscle twitching     Guaifenesin & Derivatives      Hypertension     Hydrocodone-Acetaminophen      Iodides      swells up, burns      Iron      Kiwi      Lactulose Nausea and Vomiting     Loperamide      Macrolides      Hives       Memantine      Metformin Headache     Methylprednisolone Nausea and Swelling     Metoclopramide      Morphine      Neomycin      ear drops-ears swell up inside     Prochlorperazine      Pseudoephedrine-Guaifenesin Cr      Quinolones      Other reaction       Sodium Chloride Swelling     Strawberry (Diagnostic) Swelling     Sulfa Drugs      Tartrazine Difficulty breathing     Acetic Acid Rash     Antipyrine Rash     Food Palpitations      "Chocolate and butterscottch, strawberries, walnuts     Imidazole Antifungals Rash and Unknown     Nuts Palpitations     Chocolate and butterscottch, strawberries, walnuts       FAMILY HISTORY:  Family History   Problem Relation Age of Onset     Syncope Mother      Hypertension Mother      Cerebrovascular Disease Father 62     Diabetes Father      Cerebrovascular Disease Brother 63     Gallbladder Disease Maternal Grandmother      Breast Cancer Maternal Grandmother      Gallbladder Disease Maternal Aunt      Throat cancer Maternal Aunt        SOCIAL HISTORY:   Social History     Socioeconomic History     Marital status:    Tobacco Use     Smoking status: Never     Smokeless tobacco: Never   Substance and Sexual Activity     Alcohol use: Not Currently       VITALS:  BP (!) 194/103   Pulse 82   Temp 98.6  F (37  C) (Temporal)   Resp 20   Ht 1.664 m (5' 5.5\")   Wt 98.7 kg (217 lb 9.6 oz)   SpO2 98%   BMI 35.66 kg/m      PHYSICAL EXAM    Physical Exam  Vitals and nursing note reviewed.   Constitutional:       General: She is not in acute distress.     Appearance: Normal appearance. She is normal weight. She is not toxic-appearing or diaphoretic.   HENT:      Right Ear: External ear normal.      Left Ear: External ear normal.   Eyes:      Conjunctiva/sclera: Conjunctivae normal.   Cardiovascular:      Rate and Rhythm: Normal rate and regular rhythm.      Pulses: Normal pulses.   Pulmonary:      Effort: Pulmonary effort is normal.   Abdominal:      General: Abdomen is flat.      Palpations: Abdomen is soft.   Musculoskeletal:         General: Tenderness and signs of injury present. No swelling or deformity.      Cervical back: Normal range of motion. No rigidity or tenderness.      Comments: No midline cervical, thoracic, lumbar pain.  Does have some lateral tenderness to the neck.  No swelling or deformity.  Additional diffuse tenderness along the posterior aspect of the left scapula, along the anterior " portion of the left chest, as well as diffusely atop the left clavicle.  Pain worsening with range of motion of the shoulder, worse with abduction.   Skin:     Findings: No erythema or rash.   Neurological:      Mental Status: She is alert. Mental status is at baseline.      Sensory: No sensory deficit.      Motor: No weakness.      Comments: Strength and sensation intact to upper and lower extremities bilaterally.        LAB:  All pertinent labs reviewed and interpreted.  Labs Ordered and Resulted from Time of ED Arrival to Time of ED Departure - No data to display    RADIOLOGY:  Reviewed all pertinent imaging. Please see official radiology report.  Ribs XR, unilat 3 views + PA chest,  left   Final Result   IMPRESSION: The visualized heart and lungs are negative. No rib fractures.      XR Shoulder Left 2 Views   Final Result   IMPRESSION: Normal joint spaces and alignment. No fracture.        PROCEDURES:   None.     Schuyler Hayward PA-C  Emergency Medicine  [facility] EMERGENCY ROOM  [address] [address]  [phone]  Dept: [phone]     Schuyler Hayward PA-C  11/25/22 1289

## 2022-11-25 NOTE — ED TRIAGE NOTES
"When exercising on Monday getting up from the floor exercise, heard a \"pop\" in her left shoulder.    Saw chiropractor on Wednesday which made it worse. coming in for increased pain.    Radial pulse strong.   Triage Assessment     Row Name 11/25/22 1127       Triage Assessment (Adult)    Airway WDL WDL       Respiratory WDL    Respiratory WDL WDL       Skin Circulation/Temperature WDL    Skin Circulation/Temperature WDL WDL       Cardiac WDL    Cardiac WDL WDL       Peripheral/Neurovascular WDL    Peripheral Neurovascular WDL WDL       Cognitive/Neuro/Behavioral WDL    Cognitive/Neuro/Behavioral WDL WDL              "

## 2022-11-27 ENCOUNTER — HEALTH MAINTENANCE LETTER (OUTPATIENT)
Age: 62
End: 2022-11-27

## 2022-12-30 ENCOUNTER — HOSPITAL ENCOUNTER (EMERGENCY)
Facility: CLINIC | Age: 62
Discharge: HOME | End: 2022-12-30
Admitting: NURSE PRACTITIONER
Payer: COMMERCIAL

## 2022-12-30 VITALS
HEIGHT: 65 IN | WEIGHT: 216 LBS | BODY MASS INDEX: 35.99 KG/M2 | DIASTOLIC BLOOD PRESSURE: 78 MMHG | OXYGEN SATURATION: 95 % | RESPIRATION RATE: 16 BRPM | SYSTOLIC BLOOD PRESSURE: 169 MMHG | TEMPERATURE: 98.7 F | HEART RATE: 72 BPM

## 2022-12-30 DIAGNOSIS — R10.13 ABDOMINAL PAIN, EPIGASTRIC: ICD-10-CM

## 2022-12-30 DIAGNOSIS — R73.9 HYPERGLYCEMIA: ICD-10-CM

## 2022-12-30 LAB
ALBUMIN SERPL-MCNC: 3.5 G/DL (ref 3.5–5)
ALP SERPL-CCNC: 72 U/L (ref 45–120)
ALT SERPL W P-5'-P-CCNC: 25 U/L (ref 0–45)
ANION GAP SERPL CALCULATED.3IONS-SCNC: 9 MMOL/L (ref 5–18)
AST SERPL W P-5'-P-CCNC: 20 U/L (ref 0–40)
BASOPHILS # BLD AUTO: 0 10E3/UL (ref 0–0.2)
BASOPHILS NFR BLD AUTO: 0 %
BILIRUB SERPL-MCNC: 0.1 MG/DL (ref 0–1)
BUN SERPL-MCNC: 18 MG/DL (ref 8–22)
CALCIUM SERPL-MCNC: 9.8 MG/DL (ref 8.5–10.5)
CHLORIDE BLD-SCNC: 103 MMOL/L (ref 98–107)
CO2 SERPL-SCNC: 27 MMOL/L (ref 22–31)
CREAT SERPL-MCNC: 0.83 MG/DL (ref 0.6–1.1)
EOSINOPHIL # BLD AUTO: 0.1 10E3/UL (ref 0–0.7)
EOSINOPHIL NFR BLD AUTO: 1 %
ERYTHROCYTE [DISTWIDTH] IN BLOOD BY AUTOMATED COUNT: 13.3 % (ref 10–15)
GFR SERPL CREATININE-BSD FRML MDRD: 79 ML/MIN/1.73M2
GLUCOSE BLD-MCNC: 150 MG/DL (ref 70–125)
HCT VFR BLD AUTO: 36.4 % (ref 35–47)
HGB BLD-MCNC: 11.6 G/DL (ref 11.7–15.7)
IMM GRANULOCYTES # BLD: 0 10E3/UL
IMM GRANULOCYTES NFR BLD: 0 %
LIPASE SERPL-CCNC: 30 U/L (ref 0–52)
LYMPHOCYTES # BLD AUTO: 3.2 10E3/UL (ref 0.8–5.3)
LYMPHOCYTES NFR BLD AUTO: 29 %
MCH RBC QN AUTO: 27.7 PG (ref 26.5–33)
MCHC RBC AUTO-ENTMCNC: 31.9 G/DL (ref 31.5–36.5)
MCV RBC AUTO: 87 FL (ref 78–100)
MONOCYTES # BLD AUTO: 0.4 10E3/UL (ref 0–1.3)
MONOCYTES NFR BLD AUTO: 4 %
NEUTROPHILS # BLD AUTO: 7.2 10E3/UL (ref 1.6–8.3)
NEUTROPHILS NFR BLD AUTO: 66 %
NRBC # BLD AUTO: 0 10E3/UL
NRBC BLD AUTO-RTO: 0 /100
PLATELET # BLD AUTO: 299 10E3/UL (ref 150–450)
POTASSIUM BLD-SCNC: 4 MMOL/L (ref 3.5–5)
PROT SERPL-MCNC: 7.8 G/DL (ref 6–8)
RBC # BLD AUTO: 4.19 10E6/UL (ref 3.8–5.2)
SODIUM SERPL-SCNC: 139 MMOL/L (ref 136–145)
WBC # BLD AUTO: 10.9 10E3/UL (ref 4–11)

## 2022-12-30 PROCEDURE — 83690 ASSAY OF LIPASE: CPT | Performed by: EMERGENCY MEDICINE

## 2022-12-30 PROCEDURE — 85025 COMPLETE CBC W/AUTO DIFF WBC: CPT | Performed by: EMERGENCY MEDICINE

## 2022-12-30 PROCEDURE — 80053 COMPREHEN METABOLIC PANEL: CPT | Performed by: EMERGENCY MEDICINE

## 2022-12-30 PROCEDURE — 99283 EMERGENCY DEPT VISIT LOW MDM: CPT

## 2022-12-30 PROCEDURE — 36415 COLL VENOUS BLD VENIPUNCTURE: CPT | Performed by: EMERGENCY MEDICINE

## 2022-12-30 NOTE — DISCHARGE INSTRUCTIONS
Your labs are normal with the exception of your glucose which is mildly elevated today.    It seems likely that your abdominal pain is due to your recent respiratory illness.    Continue to rest and stay hydrated.  Activity as tolerated.    Follow-up in clinic in 1 to 2 weeks if symptoms persist    Return to the emergency department for new/worsening symptoms or other concerns

## 2022-12-30 NOTE — ED TRIAGE NOTES
"Pt with a history of gallbladder \"problems\" presents with upper abdominal pain that started for the past 2 weeks.  Recently had Influenza A and had a severe cough.     Triage Assessment     Row Name 12/30/22 7661       Triage Assessment (Adult)    Airway WDL WDL       Respiratory WDL    Respiratory WDL WDL       Skin Circulation/Temperature WDL    Skin Circulation/Temperature WDL WDL       Cardiac WDL    Cardiac WDL WDL       Peripheral/Neurovascular WDL    Peripheral Neurovascular WDL WDL       Cognitive/Neuro/Behavioral WDL    Cognitive/Neuro/Behavioral WDL WDL              "
no clubbing/no cyanosis

## 2022-12-30 NOTE — ED PROVIDER NOTES
EMERGENCY DEPARTMENT ENCOUNTER      NAME: Jo-Ann Jeffery  AGE: 62 year old female  YOB: 1960  MRN: [id number]  EVALUATION DATE & TIME: 2022  5:06 PM    PCP: Shell Hassan    ED PROVIDER: KELL Okeefe, CNP      Chief Complaint   Patient presents with     Abdominal Pain         FINAL IMPRESSION:  1. Abdominal pain, epigastric    2. Hyperglycemia          ED COURSE & MEDICAL DECISION MAKIN:08 PM I met with the patient, obtained history, performed an initial exam, and discussed options and plan for treatment here in the ED.    Pertinent Labs & Imaging studies reviewed. (See chart for details)  62 year old female presents to the Emergency Department for evaluation of abdominal pain.  Mild anemia and mild hyperglycemia but labs otherwise normal.  No evidence of pancreatitis.  Has no abdominal tenderness on exam.  She was concerned about recurrent pancreatitis.  I think this seems unlikely given her lack of tenderness and normal lipase.  Deferred any CT or ultrasound imaging of the abdomen at this time.  Recommend she follow-up in clinic in 1 to 2 weeks if symptoms persist.  Advised to return for new/worsening symptoms or other concerns.    At the conclusion of the encounter I discussed the results of all of the tests and the disposition. The questions were answered. The patient or family acknowledged understanding and was agreeable with the care plan.       MEDICATIONS GIVEN IN THE EMERGENCY:  Medications - No data to display    NEW PRESCRIPTIONS STARTED AT TODAY'S ER VISIT  Discharge Medication List as of 2022  5:39 PM               =================================================================    HPI    Patient information was obtained from: Patient    Use of Intrepreter: N/A        Jo-Ann Jeffery is a 62 year old female with a history of pancreatitis who presents today for evaluation of upper abdominal pain.  Recent cough and URI and has been improving.  Notes sore  upper abdominal muscles and was worried about recurrent pancreatitis.  Symptoms have been coming and going.  Did feel worse last night after eating some hamburger otherwise has been eating a low-fat diet.  Denies any associated nausea, vomiting, fever, urinary symptoms.  Does note some lower back muscles as well.  Has numerous allergies and has not been taking anything for symptom management.      REVIEW OF SYSTEMS   ROS negative except as noted in the HPI    PAST MEDICAL HISTORY:  Past Medical History:   Diagnosis Date     Alcohol dependence in remission (H) 2/21/2003    ALCOH DEP NEC/NOS-REMISS(aka ALCOHOL)      Cervical Disc Degeneration     Created by Conversion      Depressive disorder      Diabetes mellitus, type 2 (H)     Created by Conversion      Hyperlipidemia     Created by Conversion      Lumbar Disc Degeneration     Created by Conversion      Pancreatitis 8/25/2021       PAST SURGICAL HISTORY:  Past Surgical History:   Procedure Laterality Date     ENDOSCOPIC RETROGRADE CHOLANGIOPANCREATOGRAM N/A 8/30/2021    Procedure: ENDOSCOPIC RETROGRADE CHOLANGIOPANCREATOGRAPHY WITH SPHINCTEROTOMY;  Surgeon: Stephen Rivas MD;  Location: [facility] Main OR           CURRENT MEDICATIONS:    No current facility-administered medications for this encounter.     Current Outpatient Medications   Medication     albuterol (PROAIR HFA/PROVENTIL HFA/VENTOLIN HFA) 108 (90 Base) MCG/ACT inhaler     calcium-magnesium (CALMAG) 500-250 MG TABS per tablet     insulin  UNIT/ML vial     multivitamin w/minerals (THERA-VIT-M) tablet     vitamin D3 (CHOLECALCIFEROL) 50 mcg (2000 units) tablet     Zinc Oxide-Vitamin C (ZINC PLUS VITAMIN C PO)         ALLERGIES:  Allergies   Allergen Reactions     Azithromycin Hives     Cephalexin Hives     Contrast Dye Shortness Of Breath     Erythromycin Hives     hives       Latex Hives     hands break out with latex gloves     Moxifloxacin Anaphylaxis     Penicillins Hives     nausea        Soap Hives     Tide soap, Soft soap, Hien Dish soap     Sulfamethoxazole-Trimethoprim Nausea     Amides      Aminoglycosides      Amitriptyline      Antipyrine-Benzocaine      Cefprozil Hives     Chocolate Flavor      Annotation: pudding       Ciprofloxacin Hives     Clindamycin Other (See Comments)     Severe diarrhea     Clonazepam      Hives       Dairy Products [Milk Protein Extract]      Tachycardia       Diphenhydramine Other (See Comments)     Racing heart     Fluoxetine      Hives       Glipizide Other (See Comments)     Muscle pain/muscle twitching     Guaifenesin & Derivatives      Hypertension     Hydrocodone-Acetaminophen      Iodides      swells up, burns      Iron      Kiwi      Lactulose Nausea and Vomiting     Loperamide      Macrolides      Hives       Memantine      Metformin Headache     Methylprednisolone Nausea and Swelling     Metoclopramide      Morphine      Neomycin      ear drops-ears swell up inside     Prochlorperazine      Pseudoephedrine-Guaifenesin Cr      Quinolones      Other reaction       Sodium Chloride Swelling     Strawberry (Diagnostic) Swelling     Sulfa Drugs      Tartrazine Difficulty breathing     Acetic Acid Rash     Antipyrine Rash     Food Palpitations     Chocolate and butterscottch, strawberries, walnuts     Imidazole Antifungals Rash and Unknown     Nuts Palpitations     Chocolate and butterscottch, strawberries, walnuts       FAMILY HISTORY:  Family History   Problem Relation Age of Onset     Syncope Mother      Hypertension Mother      Cerebrovascular Disease Father 62     Diabetes Father      Cerebrovascular Disease Brother 63     Gallbladder Disease Maternal Grandmother      Breast Cancer Maternal Grandmother      Gallbladder Disease Maternal Aunt      Throat cancer Maternal Aunt        SOCIAL HISTORY:   Social History     Socioeconomic History     Marital status:    Tobacco Use     Smoking status: Never     Smokeless tobacco: Never   Substance and  "Sexual Activity     Alcohol use: Not Currently         VITALS:  Patient Vitals for the past 24 hrs:   BP Temp Temp src Pulse Resp SpO2 Height Weight   12/30/22 1740 (!) 169/78 -- -- 72 -- 95 % -- --   12/30/22 1418 (!) 183/73 98.7  F (37.1  C) Oral 80 16 96 % 1.651 m (5' 5\") 98 kg (216 lb)       PHYSICAL EXAM    Constitutional:  Well developed, well nourished, no acute distress  EYES: Conjunctivae clear  HENT:  Atraumatic, normocephalic  Respiratory:  No respiratory distress, normal breath sounds  Cardiovascular:  Normal rate, normal rhythm, no murmurs  GI:  Soft, nondistended, nontender, no palpable masses, no rebound, no guarding   : No CVA tenderness.    Integument: Warm, Dry, No erythema, No rash.   Neurologic:  Alert & oriented x 3              LAB:  All pertinent labs reviewed and interpreted.  Labs Ordered and Resulted from Time of ED Arrival to Time of ED Departure   COMPREHENSIVE METABOLIC PANEL - Abnormal       Result Value    Sodium 139      Potassium 4.0      Chloride 103      Carbon Dioxide (CO2) 27      Anion Gap 9      Urea Nitrogen 18      Creatinine 0.83      Calcium 9.8      Glucose 150 (*)     Alkaline Phosphatase 72      AST 20      ALT 25      Protein Total 7.8      Albumin 3.5      Bilirubin Total 0.1      GFR Estimate 79     CBC WITH PLATELETS AND DIFFERENTIAL - Abnormal    WBC Count 10.9      RBC Count 4.19      Hemoglobin 11.6 (*)     Hematocrit 36.4      MCV 87      MCH 27.7      MCHC 31.9      RDW 13.3      Platelet Count 299      % Neutrophils 66      % Lymphocytes 29      % Monocytes 4      % Eosinophils 1      % Basophils 0      % Immature Granulocytes 0      NRBCs per 100 WBC 0      Absolute Neutrophils 7.2      Absolute Lymphocytes 3.2      Absolute Monocytes 0.4      Absolute Eosinophils 0.1      Absolute Basophils 0.0      Absolute Immature Granulocytes 0.0      Absolute NRBCs 0.0     LIPASE - Normal    Lipase 30           RADIOLOGY:  Reviewed all pertinent imaging. Please see " official radiology report.  No orders to display             PROCEDURES:   None        KELL Okeefe, CNP  Emergency Medicine  [facility] EMERGENCY ROOM  [address] [place] [id number]  [phone]  Dept: [phone]         Huey Clemens APRN CNP  12/30/22 8186

## 2023-03-26 ENCOUNTER — HEALTH MAINTENANCE LETTER (OUTPATIENT)
Age: 63
End: 2023-03-26

## 2023-04-14 ENCOUNTER — APPOINTMENT (OUTPATIENT)
Dept: RADIOLOGY | Facility: HOSPITAL | Age: 63
End: 2023-04-14
Attending: EMERGENCY MEDICINE
Payer: COMMERCIAL

## 2023-04-14 ENCOUNTER — HOSPITAL ENCOUNTER (EMERGENCY)
Facility: HOSPITAL | Age: 63
Discharge: HOME | End: 2023-04-14
Attending: EMERGENCY MEDICINE | Admitting: EMERGENCY MEDICINE
Payer: COMMERCIAL

## 2023-04-14 VITALS
HEART RATE: 72 BPM | BODY MASS INDEX: 36.65 KG/M2 | TEMPERATURE: 98.6 F | WEIGHT: 220 LBS | SYSTOLIC BLOOD PRESSURE: 185 MMHG | DIASTOLIC BLOOD PRESSURE: 84 MMHG | OXYGEN SATURATION: 96 % | HEIGHT: 65 IN | RESPIRATION RATE: 20 BRPM

## 2023-04-14 DIAGNOSIS — F41.9 ANXIETY: ICD-10-CM

## 2023-04-14 DIAGNOSIS — B34.9 VIRAL SYNDROME: ICD-10-CM

## 2023-04-14 LAB
ALBUMIN SERPL BCG-MCNC: 3.8 G/DL (ref 3.5–5.2)
ALBUMIN UR-MCNC: NEGATIVE MG/DL
ALP SERPL-CCNC: 77 U/L (ref 35–104)
ALT SERPL W P-5'-P-CCNC: 18 U/L (ref 10–35)
ANION GAP SERPL CALCULATED.3IONS-SCNC: 9 MMOL/L (ref 7–15)
APPEARANCE UR: CLEAR
AST SERPL W P-5'-P-CCNC: 17 U/L (ref 10–35)
BACTERIA #/AREA URNS HPF: ABNORMAL /HPF
BASOPHILS # BLD AUTO: 0 10E3/UL (ref 0–0.2)
BASOPHILS NFR BLD AUTO: 0 %
BILIRUB SERPL-MCNC: 0.3 MG/DL
BILIRUB UR QL STRIP: NEGATIVE
BUN SERPL-MCNC: 13.5 MG/DL (ref 8–23)
CALCIUM SERPL-MCNC: 9.1 MG/DL (ref 8.8–10.2)
CHLORIDE SERPL-SCNC: 98 MMOL/L (ref 98–107)
COLOR UR AUTO: COLORLESS
CREAT SERPL-MCNC: 0.74 MG/DL (ref 0.51–0.95)
DEPRECATED HCO3 PLAS-SCNC: 28 MMOL/L (ref 22–29)
EOSINOPHIL # BLD AUTO: 0 10E3/UL (ref 0–0.7)
EOSINOPHIL NFR BLD AUTO: 0 %
ERYTHROCYTE [DISTWIDTH] IN BLOOD BY AUTOMATED COUNT: 13.3 % (ref 10–15)
FLUAV RNA SPEC QL NAA+PROBE: NEGATIVE
FLUBV RNA RESP QL NAA+PROBE: NEGATIVE
GFR SERPL CREATININE-BSD FRML MDRD: 90 ML/MIN/1.73M2
GLUCOSE BLDC GLUCOMTR-MCNC: 271 MG/DL (ref 70–99)
GLUCOSE SERPL-MCNC: 277 MG/DL (ref 70–99)
GLUCOSE UR STRIP-MCNC: 500 MG/DL
GROUP A STREP BY PCR: NOT DETECTED
HCT VFR BLD AUTO: 34.4 % (ref 35–47)
HGB BLD-MCNC: 11.1 G/DL (ref 11.7–15.7)
HGB UR QL STRIP: NEGATIVE
HOLD SPECIMEN: NORMAL
IMM GRANULOCYTES # BLD: 0 10E3/UL
IMM GRANULOCYTES NFR BLD: 0 %
KETONES UR STRIP-MCNC: NEGATIVE MG/DL
LEUKOCYTE ESTERASE UR QL STRIP: ABNORMAL
LIPASE SERPL-CCNC: 33 U/L (ref 13–60)
LYMPHOCYTES # BLD AUTO: 2.4 10E3/UL (ref 0.8–5.3)
LYMPHOCYTES NFR BLD AUTO: 23 %
MCH RBC QN AUTO: 27.3 PG (ref 26.5–33)
MCHC RBC AUTO-ENTMCNC: 32.3 G/DL (ref 31.5–36.5)
MCV RBC AUTO: 85 FL (ref 78–100)
MONOCYTES # BLD AUTO: 0.4 10E3/UL (ref 0–1.3)
MONOCYTES NFR BLD AUTO: 4 %
MUCOUS THREADS #/AREA URNS LPF: PRESENT /LPF
NEUTROPHILS # BLD AUTO: 7.7 10E3/UL (ref 1.6–8.3)
NEUTROPHILS NFR BLD AUTO: 73 %
NITRATE UR QL: NEGATIVE
NRBC # BLD AUTO: 0 10E3/UL
NRBC BLD AUTO-RTO: 0 /100
PH UR STRIP: 5.5 [PH] (ref 5–7)
PLATELET # BLD AUTO: 303 10E3/UL (ref 150–450)
POTASSIUM SERPL-SCNC: 3.8 MMOL/L (ref 3.4–5.3)
PROT SERPL-MCNC: 7.5 G/DL (ref 6.4–8.3)
RBC # BLD AUTO: 4.07 10E6/UL (ref 3.8–5.2)
RBC URINE: <1 /HPF
RSV RNA SPEC NAA+PROBE: NEGATIVE
SARS-COV-2 RNA RESP QL NAA+PROBE: NEGATIVE
SODIUM SERPL-SCNC: 135 MMOL/L (ref 136–145)
SP GR UR STRIP: 1.01 (ref 1–1.03)
SQUAMOUS EPITHELIAL: <1 /HPF
TROPONIN T SERPL HS-MCNC: 9 NG/L
UROBILINOGEN UR STRIP-MCNC: <2 MG/DL
WBC # BLD AUTO: 10.6 10E3/UL (ref 4–11)
WBC URINE: 1 /HPF

## 2023-04-14 PROCEDURE — 93005 ELECTROCARDIOGRAM TRACING: CPT | Performed by: EMERGENCY MEDICINE

## 2023-04-14 PROCEDURE — 83690 ASSAY OF LIPASE: CPT | Performed by: EMERGENCY MEDICINE

## 2023-04-14 PROCEDURE — 80053 COMPREHEN METABOLIC PANEL: CPT | Performed by: EMERGENCY MEDICINE

## 2023-04-14 PROCEDURE — 81001 URINALYSIS AUTO W/SCOPE: CPT | Performed by: EMERGENCY MEDICINE

## 2023-04-14 PROCEDURE — 71046 X-RAY EXAM CHEST 2 VIEWS: CPT

## 2023-04-14 PROCEDURE — C9803 HOPD COVID-19 SPEC COLLECT: HCPCS

## 2023-04-14 PROCEDURE — 84484 ASSAY OF TROPONIN QUANT: CPT | Performed by: EMERGENCY MEDICINE

## 2023-04-14 PROCEDURE — 250N000011 HC RX IP 250 OP 636: Performed by: EMERGENCY MEDICINE

## 2023-04-14 PROCEDURE — 99285 EMERGENCY DEPT VISIT HI MDM: CPT | Mod: CS,25

## 2023-04-14 PROCEDURE — 96374 THER/PROPH/DIAG INJ IV PUSH: CPT

## 2023-04-14 PROCEDURE — 36415 COLL VENOUS BLD VENIPUNCTURE: CPT | Performed by: EMERGENCY MEDICINE

## 2023-04-14 PROCEDURE — 87651 STREP A DNA AMP PROBE: CPT | Performed by: EMERGENCY MEDICINE

## 2023-04-14 PROCEDURE — 85025 COMPLETE CBC W/AUTO DIFF WBC: CPT | Performed by: EMERGENCY MEDICINE

## 2023-04-14 PROCEDURE — 93005 ELECTROCARDIOGRAM TRACING: CPT | Performed by: STUDENT IN AN ORGANIZED HEALTH CARE EDUCATION/TRAINING PROGRAM

## 2023-04-14 PROCEDURE — 82962 GLUCOSE BLOOD TEST: CPT

## 2023-04-14 PROCEDURE — 250N000011 HC RX IP 250 OP 636: Performed by: STUDENT IN AN ORGANIZED HEALTH CARE EDUCATION/TRAINING PROGRAM

## 2023-04-14 PROCEDURE — 87637 SARSCOV2&INF A&B&RSV AMP PRB: CPT | Performed by: EMERGENCY MEDICINE

## 2023-04-14 RX ORDER — KETOROLAC TROMETHAMINE 15 MG/ML
15 INJECTION, SOLUTION INTRAMUSCULAR; INTRAVENOUS ONCE
Status: COMPLETED | OUTPATIENT
Start: 2023-04-14 | End: 2023-04-14

## 2023-04-14 RX ORDER — LORAZEPAM 0.5 MG/1
0.5 TABLET ORAL EVERY 8 HOURS PRN
Qty: 9 TABLET | Refills: 0 | Status: SHIPPED | OUTPATIENT
Start: 2023-04-14 | End: 2023-04-23

## 2023-04-14 RX ORDER — ONDANSETRON 4 MG/1
4 TABLET, ORALLY DISINTEGRATING ORAL ONCE
Status: COMPLETED | OUTPATIENT
Start: 2023-04-14 | End: 2023-04-14

## 2023-04-14 RX ADMIN — ONDANSETRON 4 MG: 4 TABLET, ORALLY DISINTEGRATING ORAL at 04:37

## 2023-04-14 RX ADMIN — KETOROLAC TROMETHAMINE 15 MG: 15 INJECTION, SOLUTION INTRAMUSCULAR; INTRAVENOUS at 06:16

## 2023-04-14 ASSESSMENT — ACTIVITIES OF DAILY LIVING (ADL)
ADLS_ACUITY_SCORE: 35
ADLS_ACUITY_SCORE: 35

## 2023-04-14 NOTE — ED TRIAGE NOTES
Pt c/o N/V/D, headache, ear pain, cough, chest pain when vomiting that started yesterday at 3am. Pt took Advil and vomited the meds after. C/o shortness of breath. Per pt she is diabetic and she cut her insulin dose in half related to not being able to eat.     Triage Assessment       Row Name 04/14/23 0416       Triage Assessment (Adult)    Airway WDL WDL       Respiratory WDL    Respiratory WDL WDL       Skin Circulation/Temperature WDL    Skin Circulation/Temperature WDL WDL       Cardiac WDL    Cardiac WDL WDL       Peripheral/Neurovascular WDL    Peripheral Neurovascular WDL WDL       Cognitive/Neuro/Behavioral WDL    Cognitive/Neuro/Behavioral WDL WDL

## 2023-04-14 NOTE — ED PROVIDER NOTES
EMERGENCY DEPARTMENT ENCOUNTER      NAME: Jo-Ann Jeffery  AGE: 63 year old female  YOB: 1960  MRN: [id number]  EVALUATION DATE & TIME: 4/14/2023  4:22 AM    PCP: Shell Hassan    ED PROVIDER: Jimy Green M.D.      Chief Complaint   Patient presents with     Nausea, Vomiting, & Diarrhea     Headache     Otalgia     Cough         FINAL IMPRESSION:  1. Viral syndrome    2. Anxiety          ED COURSE & MEDICAL DECISION MAKING:    Pertinent Labs & Imaging studies reviewed. (See chart for details)  63 year old female presents to the Emergency Department for evaluation of body aches, nausea, cough. Patient appears non toxic with stable vitals signs, patient afebrile with no tachycardia or hypoxia, no increased work of breathing.  Lungs are clear, abdomen is benign.  No specific substernal chest pain, no ripping or tearing chest discomfort the back or shoulders, no blood in the urine or stools, she denies any urinary symptoms.  Suspect viral process, history and exam atypical for ACS, nothing to suggest sepsis, dissection, PE.  Nothing to suggest GI bleed, pyelonephritis or UTI.  We did obtain screening labs, urine studies, chest x-ray, strep and influenza, COVID screen.  Laboratory studies here by my independent interpretation showed no acute concerning findings, no elevated troponin and ECG nonischemic with certainly nothing to suggest cardiac ischemia.  No signs of acute kidney injury with a normal creatinine and no concerning electrolyte abnormalities.  No elevated white blood cell count or fever to suggest sepsis.  Urine showed leukocytes but no blood or nitrates and we will send for culture and treat only if positive.  COVID, influenza and RSV, rapid strep negative.  Chest x-ray reported no acute concerning findings.  Following our interventions patient felt improved.  I suspect mild viral URI and with an otherwise negative work-up, benign exam and reassuring vitals feel she is safe for  discharge and close follow-up.  Patient has extensive allergy list and so therefore I will recommend conservative management with Tylenol ibuprofen.  At the end of this encounter the patient did endorse much anxiety over her symptoms and she states that only lorazepam has worked for her in the past, feel it is reasonable to discharge with a prescription for a short course of lorazepam both for anxiety and nausea.  We will otherwise recommend follow-up with primary care in the next 5 to 7 days for continued outpatient management evaluation.  All questions answered and reasons to return discussed.  Patient felt comfortable this plan was discharged in stable condition.    5:29 AM I met with the patient for an interview and initial exam. Plans for treatment were discussed.  6:34 AM Repeat exam is benign. Discussed findings and pending studies.      Medical Decision Making    History:    Supplemental history from: The patient and her daughter    External Record(s) reviewed: Documented in chart, if applicable.    Work Up:    Chart documentation includes differential considered and any EKGs or imaging independently interpreted by provider, where specified.    In additional to work up documented, I considered the following work up: Documented in chart, if applicable.    External consultation:    Discussion of management with another provider: Documented in chart, if applicable    Complicating factors:    Care impacted by chronic illness: Diabetes and Hyperlipidemia    Care affected by social determinants of health: N/A    Disposition considerations: Discharge. I prescribed additional prescription strength medication(s) as charted. See documentation for any additional details.          At the conclusion of the encounter I discussed the results of all of the tests and the disposition. The questions were answered and return precautions provided. The patient or family acknowledged understanding and was agreeable with the care  plan.         MEDICATIONS GIVEN IN THE EMERGENCY:  Medications   ondansetron (ZOFRAN ODT) ODT tab 4 mg (4 mg Oral $Given 4/14/23 0438)   ketorolac (TORADOL) injection 15 mg (15 mg Intravenous $Given 4/14/23 0616)       NEW PRESCRIPTIONS STARTED AT TODAY'S ER VISIT  New Prescriptions    LORAZEPAM (ATIVAN) 0.5 MG TABLET    Take 1 tablet (0.5 mg) by mouth every 8 hours as needed for anxiety or nausea            =================================================================    HPI    Patient information was obtained from: The patient and her daughter    Use of Intrepreter: N/A        Jo-Ann Jeffery is a 63 year old female with a pertinent past medical history of type two diabetes mellitus, HLD, alcohol dependence, pancreatitis who presents to the ED by walk-in for multiple complaints.    The patient reported having diarrhea, headaches, nausea, vomiting, bilateral otalgia, body aches, low grade fever since 04/11/23 (3 days ago). She notes her  had congestion at the house. Her daughter notes the patient has dizziness when coughing. The patient reported visiting the ED 2-3 months ago and was revealed to have pneumonia. She took Advil earlier but ended up vomiting it back up shortly afterwards. She tested negative for COVID-19 yesterday at home and notes she is not vaccinated for COVID-19.    Otherwise, the patient denied having chest pain, hematuria, blood in stool, new skin rash, and any other complaints or concerns at this time.      REVIEW OF SYSTEMS   Constitutional:  Denies chills. Positive for low grade fever.  HENT: Positive for otalgia.  Respiratory:  Positive for cough and shortness of breath.  Cardiovascular:  Denies chest pain, palpitations  GI:  Denies abdominal pain. Positive for nausea, vomiting, and diarrhea.  Musculoskeletal:  Denies any new muscle/joint swelling. Positive for body aches.  Skin:  Denies rash   Neurologic:  Denies focal weakness. Positive for headache and dizziness.  All systems  negative except as marked.     PAST MEDICAL HISTORY:  Past Medical History:   Diagnosis Date     Alcohol dependence in remission (H) 2/21/2003    ALCOH DEP NEC/NOS-REMISS(aka ALCOHOL)      Cervical Disc Degeneration     Created by Conversion      Depressive disorder      Diabetes mellitus, type 2 (H)     Created by Conversion      Hyperlipidemia     Created by Conversion      Lumbar Disc Degeneration     Created by Conversion      Pancreatitis 8/25/2021       PAST SURGICAL HISTORY:  Past Surgical History:   Procedure Laterality Date     ENDOSCOPIC RETROGRADE CHOLANGIOPANCREATOGRAM N/A 8/30/2021    Procedure: ENDOSCOPIC RETROGRADE CHOLANGIOPANCREATOGRAPHY WITH SPHINCTEROTOMY;  Surgeon: Stephen Rivas MD;  Location: [facility] Main OR         CURRENT MEDICATIONS:    Prior to Admission medications    Medication Sig Start Date End Date Taking? Authorizing Provider   albuterol (PROAIR HFA/PROVENTIL HFA/VENTOLIN HFA) 108 (90 Base) MCG/ACT inhaler Inhale 2 puffs into the lungs every 6 hours as needed for shortness of breath / dyspnea or wheezing  Patient not taking: Reported on 11/1/2021    Unknown, Entered By History   calcium-magnesium (CALMAG) 500-250 MG TABS per tablet Take 2 tablets by mouth every evening    Unknown, Entered By History   insulin  UNIT/ML vial Inject 20 Units Subcutaneous 2 times daily (before meals)    Unknown, Entered By History   multivitamin w/minerals (THERA-VIT-M) tablet Take 1 tablet by mouth daily    Unknown, Entered By History   vitamin D3 (CHOLECALCIFEROL) 50 mcg (2000 units) tablet Take 50 mcg by mouth daily 3 gummies = 2000 units     Unknown, Entered By History   Zinc Oxide-Vitamin C (ZINC PLUS VITAMIN C PO) Take 3 tablets by mouth daily     Unknown, Entered By History        ALLERGIES:  Allergies   Allergen Reactions     Azithromycin Hives     Cephalexin Hives     Contrast Dye Shortness Of Breath     Erythromycin Hives     hives       Latex Hives     hands break out with  latex gloves     Moxifloxacin Anaphylaxis     Penicillins Hives     nausea       Soap Hives     Tide soap, Soft soap, Hien Dish soap     Sulfamethoxazole-Trimethoprim Nausea     Amides      Aminoglycosides      Amitriptyline      Antipyrine-Benzocaine      Cefprozil Hives     Chocolate Flavor      Annotation: pudding       Ciprofloxacin Hives     Clindamycin Other (See Comments)     Severe diarrhea     Clonazepam      Hives       Dairy Products [Milk Protein Extract]      Tachycardia       Diphenhydramine Other (See Comments)     Racing heart     Fluoxetine      Hives       Glipizide Other (See Comments)     Muscle pain/muscle twitching     Guaifenesin & Derivatives      Hypertension     Hydrocodone-Acetaminophen      Iodides      swells up, burns      Iron      Kiwi      Lactulose Nausea and Vomiting     Loperamide      Macrolides      Hives       Memantine      Metformin Headache     Methylprednisolone Nausea and Swelling     Metoclopramide      Morphine      Neomycin      ear drops-ears swell up inside     Prochlorperazine      Pseudoephedrine-Guaifenesin Cr      Quinolones      Other reaction       Sodium Chloride Swelling     Strawberry (Diagnostic) Swelling     Sulfa Drugs      Tartrazine Difficulty breathing     Acetic Acid Rash     Antipyrine Rash     Food Palpitations     Chocolate and butterscottch, strawberries, walnuts     Imidazole Antifungals Rash and Unknown     Nuts Palpitations     Chocolate and butterscottch, strawberries, walnuts       FAMILY HISTORY:  Family History   Problem Relation Age of Onset     Syncope Mother      Hypertension Mother      Cerebrovascular Disease Father 62     Diabetes Father      Cerebrovascular Disease Brother 63     Gallbladder Disease Maternal Grandmother      Breast Cancer Maternal Grandmother      Gallbladder Disease Maternal Aunt      Throat cancer Maternal Aunt        SOCIAL HISTORY:   Social History     Socioeconomic History     Marital status:    Tobacco  "Use     Smoking status: Never     Smokeless tobacco: Never   Substance and Sexual Activity     Alcohol use: Not Currently       VITALS:  Patient Vitals for the past 24 hrs:   BP Temp Temp src Pulse Resp SpO2 Height Weight   04/14/23 0700 (!) 159/76 -- -- 78 -- 94 % -- --   04/14/23 0645 (!) 162/73 -- -- 79 -- 94 % -- --   04/14/23 0630 (!) 159/59 -- -- 81 -- 96 % -- --   04/14/23 0600 (!) 171/79 -- -- 77 22 95 % -- --   04/14/23 0545 (!) 169/64 -- -- 77 23 95 % -- --   04/14/23 0515 (!) 157/74 -- -- 77 16 95 % -- --   04/14/23 0500 (!) 158/75 -- -- 76 18 94 % -- --   04/14/23 0430 (!) 179/82 -- -- 90 20 97 % -- --   04/14/23 0414 (!) 175/72 98.2  F (36.8  C) Oral 92 20 96 % 1.651 m (5' 5\") 99.8 kg (220 lb)        PHYSICAL EXAM    Constitutional:  Awake, alert, in no apparent distress  HENT:  Normocephalic, Atraumatic. Bilateral external ears normal. Oropharynx moist. Nose normal. Neck- Normal range of motion with no guarding, No midline cervical tenderness, Supple, No stridor.   Eyes:  PERRL, EOMI with no signs of entrapment, Conjunctiva normal, No discharge.   Respiratory:  Normal breath sounds, No respiratory distress, No wheezing.    Cardiovascular:  Normal heart rate, Normal rhythm, No appreciable rubs or gallops.   GI:  Soft, No tenderness, No distension, No palpable masses  Musculoskeletal:  Intact distal pulses, No edema. Good range of motion in all major joints. No tenderness to palpation or major deformities noted.  Integument:  Warm, Dry, No erythema, No rash.   Neurologic:  Alert & oriented, Normal motor function, Normal sensory function, No focal deficits noted.   Psychiatric:  Affect normal, Judgment normal, Mood normal.     LAB:  All pertinent labs reviewed and interpreted.  Results for orders placed or performed during the hospital encounter of 04/14/23   XR Chest 2 Views    Impression    IMPRESSION: No evidence of active cardiopulmonary disease.    UA with Microscopic reflex to Culture    Specimen: " Urine, Clean Catch   Result Value Ref Range    Color Urine Colorless Colorless, Straw, Light Yellow, Yellow    Appearance Urine Clear Clear    Glucose Urine 500 (A) Negative mg/dL    Bilirubin Urine Negative Negative    Ketones Urine Negative Negative mg/dL    Specific Gravity Urine 1.007 1.001 - 1.030    Blood Urine Negative Negative    pH Urine 5.5 5.0 - 7.0    Protein Albumin Urine Negative Negative mg/dL    Urobilinogen Urine <2.0 <2.0 mg/dL    Nitrite Urine Negative Negative    Leukocyte Esterase Urine 25 Lazaro/uL (A) Negative    Bacteria Urine Few (A) None Seen /HPF    Mucus Urine Present (A) None Seen /LPF    RBC Urine <1 <=2 /HPF    WBC Urine 1 <=5 /HPF    Squamous Epithelials Urine <1 <=1 /HPF   Extra Red Top Tube   Result Value Ref Range    Hold Specimen JIC    Extra Green Top (Lithium Heparin) Tube   Result Value Ref Range    Hold Specimen JIC    Extra Purple Top Tube   Result Value Ref Range    Hold Specimen JIC    Glucose by meter   Result Value Ref Range    GLUCOSE BY METER POCT 271 (H) 70 - 99 mg/dL   Extra Blue Top Tube   Result Value Ref Range    Hold Specimen JIC    Comprehensive metabolic panel   Result Value Ref Range    Sodium 135 (L) 136 - 145 mmol/L    Potassium 3.8 3.4 - 5.3 mmol/L    Chloride 98 98 - 107 mmol/L    Carbon Dioxide (CO2) 28 22 - 29 mmol/L    Anion Gap 9 7 - 15 mmol/L    Urea Nitrogen 13.5 8.0 - 23.0 mg/dL    Creatinine 0.74 0.51 - 0.95 mg/dL    Calcium 9.1 8.8 - 10.2 mg/dL    Glucose 277 (H) 70 - 99 mg/dL    Alkaline Phosphatase 77 35 - 104 U/L    AST 17 10 - 35 U/L    ALT 18 10 - 35 U/L    Protein Total 7.5 6.4 - 8.3 g/dL    Albumin 3.8 3.5 - 5.2 g/dL    Bilirubin Total 0.3 <=1.2 mg/dL    GFR Estimate 90 >60 mL/min/1.73m2   Result Value Ref Range    Lipase 33 13 - 60 U/L   Result Value Ref Range    Troponin T, High Sensitivity 9 <=14 ng/L   Symptomatic Influenza A/B, RSV, & SARS-CoV2 PCR (COVID-19) Nasopharyngeal    Specimen: Nasopharyngeal; Swab   Result Value Ref Range     Influenza A PCR Negative Negative    Influenza B PCR Negative Negative    RSV PCR Negative Negative    SARS CoV2 PCR Negative Negative   CBC with platelets and differential   Result Value Ref Range    WBC Count 10.6 4.0 - 11.0 10e3/uL    RBC Count 4.07 3.80 - 5.20 10e6/uL    Hemoglobin 11.1 (L) 11.7 - 15.7 g/dL    Hematocrit 34.4 (L) 35.0 - 47.0 %    MCV 85 78 - 100 fL    MCH 27.3 26.5 - 33.0 pg    MCHC 32.3 31.5 - 36.5 g/dL    RDW 13.3 10.0 - 15.0 %    Platelet Count 303 150 - 450 10e3/uL    % Neutrophils 73 %    % Lymphocytes 23 %    % Monocytes 4 %    % Eosinophils 0 %    % Basophils 0 %    % Immature Granulocytes 0 %    NRBCs per 100 WBC 0 <1 /100    Absolute Neutrophils 7.7 1.6 - 8.3 10e3/uL    Absolute Lymphocytes 2.4 0.8 - 5.3 10e3/uL    Absolute Monocytes 0.4 0.0 - 1.3 10e3/uL    Absolute Eosinophils 0.0 0.0 - 0.7 10e3/uL    Absolute Basophils 0.0 0.0 - 0.2 10e3/uL    Absolute Immature Granulocytes 0.0 <=0.4 10e3/uL    Absolute NRBCs 0.0 10e3/uL   Group A Streptococcus PCR Throat Swab    Specimen: Throat; Swab   Result Value Ref Range    Group A strep by PCR Not Detected Not Detected       RADIOLOGY:  XR Chest 2 Views   Final Result   IMPRESSION: No evidence of active cardiopulmonary disease.          EKG:    Sinus rhythm, no specific ST acute ischemic changes, no concerning dysrhythmias or interval prolongation, compared to ECG of August 27, 2022, no specific ST acute ischemic change appreciated  I have independently reviewed and interpreted the EKG(s) documented above.    I, Krishna Stringer, am serving as a scribe to document services personally performed by Jimy Green MD, based on my observation and the provider's statements to me. I, Jimy Green MD attest that Krishna Stringer is acting in a scribe capacity, has observed my performance of the services and has documented them in accordance with my direction.    Jimy Green M.D.  Emergency Medicine  [facility]  [facility] EMERGENCY DEPARTMENT  [address] [place] [id number]  [phone]  Dept: [phone]       Jimy Green MD  04/14/23 0726

## 2023-04-14 NOTE — DISCHARGE INSTRUCTIONS
You can take 650 mg of tylenol every 6-8 hours (no more than 3000 mg in 24 hours).  You can take 600 mg of ibuprofen with food every 6-8 hours (no more than 3200 mg in 24 hours).   If needed you can alternate between the two (take tylenol, then 3 hours later take a dose of ibuprofen, then 3 hours later take a dose of tylenol)    Continue to use your albuterol inhaler at home as needed for cough and shortness of breath.

## 2023-04-15 LAB
ATRIAL RATE - MUSE: 90 BPM
DIASTOLIC BLOOD PRESSURE - MUSE: NORMAL MMHG
INTERPRETATION ECG - MUSE: NORMAL
P AXIS - MUSE: 37 DEGREES
PR INTERVAL - MUSE: 178 MS
QRS DURATION - MUSE: 80 MS
QT - MUSE: 346 MS
QTC - MUSE: 423 MS
R AXIS - MUSE: -3 DEGREES
SYSTOLIC BLOOD PRESSURE - MUSE: NORMAL MMHG
T AXIS - MUSE: 32 DEGREES
VENTRICULAR RATE- MUSE: 90 BPM

## 2023-04-28 ENCOUNTER — APPOINTMENT (OUTPATIENT)
Dept: RADIOLOGY | Facility: CLINIC | Age: 63
End: 2023-04-28
Attending: EMERGENCY MEDICINE
Payer: COMMERCIAL

## 2023-04-28 ENCOUNTER — HOSPITAL ENCOUNTER (EMERGENCY)
Facility: CLINIC | Age: 63
Discharge: HOME | End: 2023-04-28
Attending: EMERGENCY MEDICINE | Admitting: EMERGENCY MEDICINE
Payer: COMMERCIAL

## 2023-04-28 VITALS
HEART RATE: 81 BPM | RESPIRATION RATE: 21 BRPM | DIASTOLIC BLOOD PRESSURE: 71 MMHG | SYSTOLIC BLOOD PRESSURE: 131 MMHG | TEMPERATURE: 98.4 F | OXYGEN SATURATION: 96 %

## 2023-04-28 DIAGNOSIS — I48.91 NEW ONSET ATRIAL FIBRILLATION (H): ICD-10-CM

## 2023-04-28 LAB
ALBUMIN SERPL-MCNC: 3.8 G/DL (ref 3.5–5)
ALP SERPL-CCNC: 72 U/L (ref 45–120)
ALT SERPL W P-5'-P-CCNC: 20 U/L (ref 0–45)
ANION GAP SERPL CALCULATED.3IONS-SCNC: 9 MMOL/L (ref 5–18)
APTT PPP: 26 SECONDS (ref 22–38)
AST SERPL W P-5'-P-CCNC: 20 U/L (ref 0–40)
ATRIAL RATE - MUSE: 214 BPM
BASOPHILS # BLD AUTO: 0 10E3/UL (ref 0–0.2)
BASOPHILS NFR BLD AUTO: 0 %
BILIRUB SERPL-MCNC: 0.2 MG/DL (ref 0–1)
BUN SERPL-MCNC: 17 MG/DL (ref 8–22)
CALCIUM SERPL-MCNC: 9.8 MG/DL (ref 8.5–10.5)
CHLORIDE BLD-SCNC: 104 MMOL/L (ref 98–107)
CO2 SERPL-SCNC: 26 MMOL/L (ref 22–31)
CREAT SERPL-MCNC: 0.91 MG/DL (ref 0.6–1.1)
DIASTOLIC BLOOD PRESSURE - MUSE: NORMAL MMHG
EOSINOPHIL # BLD AUTO: 0.1 10E3/UL (ref 0–0.7)
EOSINOPHIL NFR BLD AUTO: 0 %
ERYTHROCYTE [DISTWIDTH] IN BLOOD BY AUTOMATED COUNT: 13.6 % (ref 10–15)
GFR SERPL CREATININE-BSD FRML MDRD: 71 ML/MIN/1.73M2
GLUCOSE BLD-MCNC: 243 MG/DL (ref 70–125)
GLUCOSE BLDC GLUCOMTR-MCNC: 222 MG/DL (ref 70–99)
HCT VFR BLD AUTO: 38.4 % (ref 35–47)
HGB BLD-MCNC: 12.4 G/DL (ref 11.7–15.7)
HOLD SPECIMEN: NORMAL
IMM GRANULOCYTES # BLD: 0.1 10E3/UL
IMM GRANULOCYTES NFR BLD: 0 %
INR PPP: 0.95 (ref 0.85–1.15)
INTERPRETATION ECG - MUSE: NORMAL
LYMPHOCYTES # BLD AUTO: 4 10E3/UL (ref 0.8–5.3)
LYMPHOCYTES NFR BLD AUTO: 35 %
MAGNESIUM SERPL-MCNC: 2.2 MG/DL (ref 1.8–2.6)
MCH RBC QN AUTO: 27.3 PG (ref 26.5–33)
MCHC RBC AUTO-ENTMCNC: 32.3 G/DL (ref 31.5–36.5)
MCV RBC AUTO: 85 FL (ref 78–100)
MONOCYTES # BLD AUTO: 0.7 10E3/UL (ref 0–1.3)
MONOCYTES NFR BLD AUTO: 6 %
NEUTROPHILS # BLD AUTO: 6.7 10E3/UL (ref 1.6–8.3)
NEUTROPHILS NFR BLD AUTO: 59 %
NRBC # BLD AUTO: 0 10E3/UL
NRBC BLD AUTO-RTO: 0 /100
P AXIS - MUSE: NORMAL DEGREES
PLATELET # BLD AUTO: 308 10E3/UL (ref 150–450)
POTASSIUM BLD-SCNC: 4.1 MMOL/L (ref 3.5–5)
PR INTERVAL - MUSE: NORMAL MS
PROT SERPL-MCNC: 8.3 G/DL (ref 6–8)
QRS DURATION - MUSE: 74 MS
QT - MUSE: 274 MS
QTC - MUSE: 457 MS
R AXIS - MUSE: 5 DEGREES
RBC # BLD AUTO: 4.54 10E6/UL (ref 3.8–5.2)
SODIUM SERPL-SCNC: 139 MMOL/L (ref 136–145)
SYSTOLIC BLOOD PRESSURE - MUSE: NORMAL MMHG
T AXIS - MUSE: 34 DEGREES
TROPONIN I SERPL-MCNC: 0.03 NG/ML (ref 0–0.29)
TSH SERPL DL<=0.005 MIU/L-ACNC: 2 UIU/ML (ref 0.3–5)
VENTRICULAR RATE- MUSE: 167 BPM
WBC # BLD AUTO: 11.4 10E3/UL (ref 4–11)

## 2023-04-28 PROCEDURE — 96375 TX/PRO/DX INJ NEW DRUG ADDON: CPT | Mod: 59

## 2023-04-28 PROCEDURE — 99152 MOD SED SAME PHYS/QHP 5/>YRS: CPT

## 2023-04-28 PROCEDURE — 85610 PROTHROMBIN TIME: CPT | Performed by: EMERGENCY MEDICINE

## 2023-04-28 PROCEDURE — 93005 ELECTROCARDIOGRAM TRACING: CPT | Performed by: EMERGENCY MEDICINE

## 2023-04-28 PROCEDURE — 83735 ASSAY OF MAGNESIUM: CPT | Performed by: EMERGENCY MEDICINE

## 2023-04-28 PROCEDURE — 85025 COMPLETE CBC W/AUTO DIFF WBC: CPT | Performed by: EMERGENCY MEDICINE

## 2023-04-28 PROCEDURE — 82962 GLUCOSE BLOOD TEST: CPT

## 2023-04-28 PROCEDURE — 250N000009 HC RX 250: Performed by: EMERGENCY MEDICINE

## 2023-04-28 PROCEDURE — 250N000011 HC RX IP 250 OP 636: Performed by: EMERGENCY MEDICINE

## 2023-04-28 PROCEDURE — 94640 AIRWAY INHALATION TREATMENT: CPT

## 2023-04-28 PROCEDURE — 92960 CARDIOVERSION ELECTRIC EXT: CPT

## 2023-04-28 PROCEDURE — 96374 THER/PROPH/DIAG INJ IV PUSH: CPT

## 2023-04-28 PROCEDURE — 250N000013 HC RX MED GY IP 250 OP 250 PS 637: Performed by: EMERGENCY MEDICINE

## 2023-04-28 PROCEDURE — 80053 COMPREHEN METABOLIC PANEL: CPT | Performed by: EMERGENCY MEDICINE

## 2023-04-28 PROCEDURE — 99291 CRITICAL CARE FIRST HOUR: CPT | Mod: 25

## 2023-04-28 PROCEDURE — 84443 ASSAY THYROID STIM HORMONE: CPT | Performed by: EMERGENCY MEDICINE

## 2023-04-28 PROCEDURE — 84484 ASSAY OF TROPONIN QUANT: CPT | Performed by: EMERGENCY MEDICINE

## 2023-04-28 PROCEDURE — 71045 X-RAY EXAM CHEST 1 VIEW: CPT

## 2023-04-28 PROCEDURE — 36415 COLL VENOUS BLD VENIPUNCTURE: CPT | Performed by: EMERGENCY MEDICINE

## 2023-04-28 PROCEDURE — 85730 THROMBOPLASTIN TIME PARTIAL: CPT | Performed by: EMERGENCY MEDICINE

## 2023-04-28 RX ORDER — ETOMIDATE 2 MG/ML
10 INJECTION INTRAVENOUS ONCE
Status: COMPLETED | OUTPATIENT
Start: 2023-04-28 | End: 2023-04-28

## 2023-04-28 RX ORDER — ALBUTEROL SULFATE 90 UG/1
2 AEROSOL, METERED RESPIRATORY (INHALATION) EVERY 6 HOURS PRN
Status: DISCONTINUED | OUTPATIENT
Start: 2023-04-28 | End: 2023-04-29 | Stop reason: HOSPADM

## 2023-04-28 RX ORDER — LATANOPROST 50 UG/ML
1 SOLUTION/ DROPS OPHTHALMIC AT BEDTIME
COMMUNITY
Start: 2023-03-20

## 2023-04-28 RX ORDER — DILTIAZEM HYDROCHLORIDE 5 MG/ML
20 INJECTION INTRAVENOUS ONCE
Status: COMPLETED | OUTPATIENT
Start: 2023-04-28 | End: 2023-04-28

## 2023-04-28 RX ADMIN — DILTIAZEM HYDROCHLORIDE 20 MG: 5 INJECTION, SOLUTION INTRAVENOUS at 18:30

## 2023-04-28 RX ADMIN — ALBUTEROL SULFATE 2 PUFF: 90 AEROSOL, METERED RESPIRATORY (INHALATION) at 18:29

## 2023-04-28 RX ADMIN — ALBUTEROL SULFATE 2 PUFF: 90 AEROSOL, METERED RESPIRATORY (INHALATION) at 21:49

## 2023-04-28 RX ADMIN — ETOMIDATE 10 MG: 20 INJECTION, SOLUTION INTRAVENOUS at 22:02

## 2023-04-28 ASSESSMENT — ACTIVITIES OF DAILY LIVING (ADL)
ADLS_ACUITY_SCORE: 35

## 2023-04-28 ASSESSMENT — ENCOUNTER SYMPTOMS
SHORTNESS OF BREATH: 1
LIGHT-HEADEDNESS: 0
DIZZINESS: 0
CHEST TIGHTNESS: 1
DIAPHORESIS: 0
PALPITATIONS: 1

## 2023-04-28 NOTE — ED TRIAGE NOTES
Pt presents to the ED with c/o palpitations/heart racing. Denies hx of afib. Pt reports feeling this at 3:20pm this afternoon. Endorses HTN today, stating that its not normal for her. Denies N/V, SOB, dizziness.      Triage Assessment     Row Name 04/28/23 4774       Triage Assessment (Adult)    Airway WDL WDL       Respiratory WDL    Respiratory WDL WDL       Skin Circulation/Temperature WDL    Skin Circulation/Temperature WDL WDL       Cardiac WDL    Cardiac WDL X;rhythm    Pulse Rate & Regularity radial pulse irregular    Cardiac Rhythm Atrial fibrillation;Other (Comment)  afib rvr       Peripheral/Neurovascular WDL    Peripheral Neurovascular WDL WDL       Cognitive/Neuro/Behavioral WDL    Cognitive/Neuro/Behavioral WDL WDL

## 2023-04-28 NOTE — ED PROVIDER NOTES
EMERGENCY DEPARTMENT ENCOUNTER      NAME: Jo-Ann Jeffery  AGE: 63 year old female  YOB: 1960  MRN: [id number]  EVALUATION DATE & TIME: No admission date for patient encounter.    PCP: Shell Hassan    ED PROVIDER: Atif Allen DO      Chief Complaint   Patient presents with     Palpitations         FINAL IMPRESSION:  1. New onset atrial fibrillation (H)          ED COURSE & MEDICAL DECISION MAKIN-year-old female presented to the ED for evaluation of sudden onset palpitations that occurred just a few hours prior to ED arrival.  Other than the palpitations the patient had no other associated symptoms.  She denied any associated chest pain, chest pressure, shortness breath, or dizziness.  Upon arrival to the ED the patient was noted to be tachycardic with heart rate in the 160s.  She was also hypertensive with a blood pressure of 220/123.  An EKG was obtained which showed that the patient was in atrial fibrillation with rapid ventricular response.  The patient did not appear to be in any obvious distress or discomfort at the time of initial evaluation.  Other than the tachycardia with an irregular rhythm the patient's physical symptoms unremarkable.  Following her initial evaluation the patient was given a dose of 20 mg IV diltiazem for rate control.  The patient's blood pressure improved to 121/104 within a few minutes after her initial blood pressure was taken which was prior to the administration of the diltiazem.    The patient's glucose was 243.  There is no evidence of DKA.  The remainder of the CMP, CBC, troponin, TSH, and magnesium were all reassuring.   Chest x-ray was nondiagnostic    The patient was reevaluated and informed of the reassuring lab and imaging results.  At the time reevaluation the patient was still in atrial fibrillation with rapid ventricular response.      Because the atrial fibrillation appeared to have started earlier this afternoon, the patient was informed that  she could undergo electrical cardioversion.  The patient agreed to undergo a cardioversion after discussing the risks and benefits.  Consent was signed.    The patient was then given etomidate for sedation and successfully cardioverted using 125 J.  Following the cardioversion the patient was noted to be back in normal sinus rhythm.  After she was able to wake up from the sedation the patient reported complete resolution of her palpitations and she had no other complaints.  The patient was then educated about atrial fibrillation and reassured.  A referral was placed for the patient to follow-up in the rapid access cardiology clinic.  Anticoagulation was discussed with the patient.  The patient stated that she was allergic to aspirin and that she wanted to wait to talk to the cardiologist before starting on an anticoagulant.  The patient was instructed to return back to the ED for any worsening palpitations, chest pain or chest pressure, dizziness, shortness of breath, or any other new or concerning symptoms.      Pertinent Labs & Imaging studies reviewed. (See chart for details)  5:55 PM I met with the patient to gather history and to perform my initial exam. We discussed plans for the ED course, including diagnostic testing and treatment.   10:01 PM I performed an electro cardioversion procedure on the patient.    At the conclusion of the encounter I discussed the results of all of the tests and the disposition. The questions were answered. The patient or family acknowledged understanding and was agreeable with the care plan.     Medical Decision Making    History:    Supplemental history from: Documented in chart, if applicable    External Record(s) reviewed: Documented in chart, if applicable.    Work Up:    Chart documentation includes differential considered and any EKGs or imaging independently interpreted by provider, where specified.    In additional to work up documented, I considered the following work up:  Documented in chart, if applicable.    External consultation:    Discussion of management with another provider: Documented in chart, if applicable    Complicating factors:    Care impacted by chronic illness: Diabetes    Care affected by social determinants of health: N/A    Disposition considerations: Discharge. No recommendations on prescription strength medication(s). N/A.        Critical Care  Performed by: Atif Allen DO  Authorized by: Atif Allen DO     Total critical care time: 40 minutes  Critical care time was exclusive of separately billable procedures and treating other patients.  Critical care was necessary to treat or prevent imminent or life-threatening deterioration of the following conditions: Atrial fibrillation with rapid ventricular response  Critical care was time spent personally by me on the following activities: development of treatment plan with patient or surrogate, discussions with consultants, examination of patient, evaluation of patient's response to treatment, obtaining history from patient or surrogate, ordering and performing treatments and interventions, ordering and review of laboratory studies, ordering and review of radiographic studies and re-evaluation of patient's condition, this excludes any separately billable procedures.    PPE worn: n95 mask, goggles    MEDICATIONS GIVEN IN THE EMERGENCY:  Medications   albuterol (PROVENTIL HFA/VENTOLIN HFA) inhaler (2 puffs Inhalation $Given 4/28/23 2148)   diltiazem (CARDIZEM) injection 20 mg (20 mg Intravenous $Given 4/28/23 1830)   etomidate (AMIDATE) injection 10 mg (10 mg Intravenous $Given 4/28/23 2202)       NEW PRESCRIPTIONS STARTED AT TODAY'S ER VISIT  Discharge Medication List as of 4/28/2023 11:25 PM             =================================================================    HPI    Patient information was obtained from: Patient     Use of : N/A     Jo-Ann Jeffery is a 63 year old female with a pertinent  "history of depressive disorder, DM2, hyperlipidemia, cervical & lumbar disc degeneration, pancreatitis, obesity, anxiety, alcohol dependence in remission, and borderline glaucoma with ocular hypertension who presents to this ED by private car for evaluation of palpitations.    Patient reports she takes (~fast and slow) insulin and vitamins daily. She states no history of atrial fibrillation (~usually in low 50-60's in blood pressure). However, no recent medication changes. Patient reports having numerous allergies - \"allergic to everything\". She notes undergoing stress which occurred 4 weeks ago. She notes having a stomach virus (~abdominal pain, nausea, vomiting, headaches, and fever) on 4/14. She also states her daughter had this as well. However, she no longer has these symptoms. Patient reports no smoking or alcohol use.     Patient reports doing usual, daily activities today including house cleaning. She states she ate blueberries with cool whip, and drank pepsi. Patient reports an onset of palpitations which occured at 3:20 PM today, after showering. She now endorses shortness of breath - \"chest tightness\".     Patient denies lightheadedness, dizziness, diaphoresis, and chest pain. No other medical concerns are expressed at this time.     REVIEW OF SYSTEMS   Review of Systems   Constitutional: Negative for diaphoresis.   Respiratory: Positive for chest tightness and shortness of breath.    Cardiovascular: Positive for palpitations. Negative for chest pain.   Neurological: Negative for dizziness and light-headedness.   All other systems reviewed and are negative.       PAST MEDICAL HISTORY:  Past Medical History:   Diagnosis Date     Alcohol dependence in remission (H) 2/21/2003    ALCOH DEP NEC/NOS-REMISS(aka ALCOHOL)      Cervical Disc Degeneration     Created by Conversion      Depressive disorder      Diabetes mellitus, type 2 (H)     Created by Conversion      Hyperlipidemia     Created by Conversion      " Lumbar Disc Degeneration     Created by Conversion      Pancreatitis 8/25/2021       PAST SURGICAL HISTORY:  Past Surgical History:   Procedure Laterality Date     ENDOSCOPIC RETROGRADE CHOLANGIOPANCREATOGRAM N/A 8/30/2021    Procedure: ENDOSCOPIC RETROGRADE CHOLANGIOPANCREATOGRAPHY WITH SPHINCTEROTOMY;  Surgeon: Stephen Rivas MD;  Location: [facility] Main OR           CURRENT MEDICATIONS:    albuterol (PROAIR HFA/PROVENTIL HFA/VENTOLIN HFA) 108 (90 Base) MCG/ACT inhaler  calcium-magnesium (CALMAG) 500-250 MG TABS per tablet  latanoprost (XALATAN) 0.005 % ophthalmic solution  multivitamin w/minerals (THERA-VIT-M) tablet  vitamin D3 (CHOLECALCIFEROL) 50 mcg (2000 units) tablet  Zinc Oxide-Vitamin C (ZINC PLUS VITAMIN C PO)        ALLERGIES:  Allergies   Allergen Reactions     Azithromycin Hives     Cephalexin Hives     Contrast Dye Shortness Of Breath     Erythromycin Hives     hives       Iron Anaphylaxis     Latex Hives     hands break out with latex gloves     Moxifloxacin Anaphylaxis     Penicillins Hives     nausea       Soap Hives     Tide soap, Soft soap, Hien Dish soap     Sulfamethoxazole-Trimethoprim Nausea     Aminoglycosides Other (See Comments)     ear drops-ears swell up inside  ear drops-ears swell up inside     Amitriptyline      Anesthetics, Amide      Antipyrine-Benzocaine      Cefprozil Hives     Chocolate Flavor      Annotation: pudding       Ciprofloxacin Hives     Clindamycin Other (See Comments)     Severe diarrhea     Clonazepam      Hives       Dairy Products [Milk Protein]      Tachycardia       Diphenhydramine Other (See Comments)     Racing heart     Fluoxetine      Hives       Glipizide Other (See Comments)     Muscle pain/muscle twitching     Guaifenesin & Derivatives      Hypertension     Hydrocodone-Acetaminophen      Iodides      swells up, burns      Kiwi      Lactulose Nausea and Vomiting     Loperamide      Memantine      Metformin Headache     Methylprednisolone Nausea  and Swelling     Metoclopramide      Morphine      Neomycin      ear drops-ears swell up inside     Prochlorperazine      Pseudoephedrine-Guaifenesin      Quinolones      Other reaction       Sodium Chloride Swelling and Other (See Comments)     Tartrazine Difficulty breathing     Acetic Acid Rash     Antipyrine Rash     Imidazole Antifungals Rash and Unknown     Nuts Palpitations     Chocolate and butterscottch, strawberries, walnuts       FAMILY HISTORY:  Family History   Problem Relation Age of Onset     Syncope Mother      Hypertension Mother      Cerebrovascular Disease Father 62     Diabetes Father      Cerebrovascular Disease Brother 63     Gallbladder Disease Maternal Grandmother      Breast Cancer Maternal Grandmother      Gallbladder Disease Maternal Aunt      Throat cancer Maternal Aunt        SOCIAL HISTORY:   Social History     Socioeconomic History     Marital status:      Spouse name: None     Number of children: None     Years of education: None     Highest education level: None   Tobacco Use     Smoking status: Never     Smokeless tobacco: Never   Substance and Sexual Activity     Alcohol use: Not Currently       VITALS:  /71   Pulse 81   Temp 98.4  F (36.9  C) (Oral)   Resp 21   SpO2 96%     PHYSICAL EXAM    General presentation: Alert, Vital signs reviewed. NAD  HENT: ENT inspection is normal. Oropharynx is moist and clear.   Eye: Pupils are equal and reactive to light. EOMI  Neck: The neck is supple, with full ROM, with no evidence of meningismus.  Pulmonary: Currently in no acute respiratory distress. Normal, non labored respirations, the lung sounds are normal with good equal air movement. Clear to auscultation bilaterally.   Circulatory: Tachycardic. Irregular rate and rhythm. Peripheral pulses are strong and equal. No murmurs, rubs, or gallops.   Abdominal: The abdomen is soft. Nontender. No rigidity, guarding, or rebound. Bowel sounds normal.   Neurologic: Alert, oriented to  person, place, and time. No motor deficit. No sensory deficit. Cranial nerves II through XII are intact.  Musculoskeletal: No extremity tenderness. Full range of motion in all extremities. No extremity edema.   Skin: Skin color is normal. No rash. Warm. Dry to touch.      LAB:  All pertinent labs reviewed and interpreted.  Results for orders placed or performed during the hospital encounter of 04/28/23   XR Chest Port 1 View    Impression    IMPRESSION: Negative chest.   Result Value Ref Range    INR 0.95 0.85 - 1.15   Partial thromboplastin time   Result Value Ref Range    aPTT 26 22 - 38 Seconds   Comprehensive metabolic panel   Result Value Ref Range    Sodium 139 136 - 145 mmol/L    Potassium 4.1 3.5 - 5.0 mmol/L    Chloride 104 98 - 107 mmol/L    Carbon Dioxide (CO2) 26 22 - 31 mmol/L    Anion Gap 9 5 - 18 mmol/L    Urea Nitrogen 17 8 - 22 mg/dL    Creatinine 0.91 0.60 - 1.10 mg/dL    Calcium 9.8 8.5 - 10.5 mg/dL    Glucose 243 (H) 70 - 125 mg/dL    Alkaline Phosphatase 72 45 - 120 U/L    AST 20 0 - 40 U/L    ALT 20 0 - 45 U/L    Protein Total 8.3 (H) 6.0 - 8.0 g/dL    Albumin 3.8 3.5 - 5.0 g/dL    Bilirubin Total 0.2 0.0 - 1.0 mg/dL    GFR Estimate 71 >60 mL/min/1.73m2   Result Value Ref Range    Magnesium 2.2 1.8 - 2.6 mg/dL   Result Value Ref Range    Troponin I 0.03 0.00 - 0.29 ng/mL   CBC with platelets and differential   Result Value Ref Range    WBC Count 11.4 (H) 4.0 - 11.0 10e3/uL    RBC Count 4.54 3.80 - 5.20 10e6/uL    Hemoglobin 12.4 11.7 - 15.7 g/dL    Hematocrit 38.4 35.0 - 47.0 %    MCV 85 78 - 100 fL    MCH 27.3 26.5 - 33.0 pg    MCHC 32.3 31.5 - 36.5 g/dL    RDW 13.6 10.0 - 15.0 %    Platelet Count 308 150 - 450 10e3/uL    % Neutrophils 59 %    % Lymphocytes 35 %    % Monocytes 6 %    % Eosinophils 0 %    % Basophils 0 %    % Immature Granulocytes 0 %    NRBCs per 100 WBC 0 <1 /100    Absolute Neutrophils 6.7 1.6 - 8.3 10e3/uL    Absolute Lymphocytes 4.0 0.8 - 5.3 10e3/uL    Absolute Monocytes  0.7 0.0 - 1.3 10e3/uL    Absolute Eosinophils 0.1 0.0 - 0.7 10e3/uL    Absolute Basophils 0.0 0.0 - 0.2 10e3/uL    Absolute Immature Granulocytes 0.1 <=0.4 10e3/uL    Absolute NRBCs 0.0 10e3/uL   Extra Red Top Tube   Result Value Ref Range    Hold Specimen JIC    Glucose by meter   Result Value Ref Range    GLUCOSE BY METER POCT 222 (H) 70 - 99 mg/dL   TSH with free T4 reflex   Result Value Ref Range    TSH 2.00 0.30 - 5.00 uIU/mL   ECG 12-LEAD WITH MUSE ([facility])   Result Value Ref Range    Systolic Blood Pressure  mmHg    Diastolic Blood Pressure  mmHg    Ventricular Rate 167 BPM    Atrial Rate 214 BPM    OH Interval  ms    QRS Duration 74 ms     ms    QTc 457 ms    P Axis  degrees    R AXIS 5 degrees    T Axis 34 degrees    Interpretation ECG       Atrial fibrillation with rapid ventricular response  Nonspecific ST abnormality  Abnormal ECG  When compared with ECG of 14-APR-2023 04:27,  Atrial fibrillation has replaced Sinus rhythm  Vent. rate has increased BY  77 BPM  Non-specific change in ST segment in Inferior leads  Confirmed by SEE ED PROVIDER NOTE FOR, ECG INTERPRETATION ([id number]),  James Richmond ([id number]) on 4/28/2023 6:57:13 PM         RADIOLOGY:  Reviewed all pertinent imaging. Please see official radiology report.  XR Chest Port 1 View   Final Result   IMPRESSION: Negative chest.          EKG:    Atrial fibrillation with rapid ventricular response.  Rate of 167.  Normal QRS.  Normal QT.  Diffuse ST segment changes noted.  Compared to the EKG on 4/14/2023 atrial fibrillation with rapid ventricular response has replaced normal sinus rhythm and the  nonspecific diffuse ST segment changes appear new.    I have independently reviewed and interpreted the EKG(s) documented above.    PROCEDURES:     PROCEDURE: Electrical Cardioversion with Procedural Sedation   INDICATIONS: Sedation is required to allow for Cardioversion for Atrial Fibrilation    CARDIOVERSION TYPE: Biphasic, External   SEDATION PROVIDER:  Dr Atif Allen   CARDIOVERSION PROVIDER: Dr Atif Allen   LEVEL OF SEDATION: Deep Sedation    Defined as:  Minimal = Normal response to verbal  Moderate = Responds to verbal and light tactile stimulation  Deep = Responds after repeated painful stimulation   CONSENT: Risks, benefits and alternatives were discussed with and Verbal consent was obtained from Patient.   PROCEDURE SPECIFIC CHECKLIST COMPLETED: Yes   LAST ORAL INTAKE: Unknown   ASA CLASS: 2 - Mild systemic disease   MALLAMPATI:  II - Faucial pillars and soft palate may be seen, but uvula is masked by the base of the tongue   TIME OUT: Universal protocol was followed. TIME OUT conducted just prior to starting procedure confirmed patient identity, site/side, procedure, patient position, and availability of correct equipment. Yes    Immediately prior to initiation of sedation, reassessment of clinical condition was performed which was unchanged.   MEDICATIONS GIVEN: Etomidate, 10 mg, IV    MONITORING: heart rate, cardiac monitor, continuous pulse oximeter, continuous capnometry (end tidal CO2), frequent blood pressure checks, level of consciousness checks, IV access, constant attendance by RN until patient is recovered and constant attendance by MD until patient is stable   RESPONSE: vital signs stable, airway patent and O2 saturations remained >92%   POST-SEDATION ASSESSMENT/PROCEDURE NOTE: CARDIOVERSION: Trial 1: Synchronized shock at 125 joules was Successful    SEDATION:  Lowest level oxygen saturation reached was 89%.    Post procedure patient was alert and responds to verbal stimuli    Patient was monitored during recovery and returned to pre-procedure baseline.   TOTAL MD DRUG ADMINISTRATION / MONITORING TIME: 10 minutes.   COMPLICATIONS: Patient tolerated procedure well, without complication         Dine perfect System Documentation:   CMS Diagnoses:               Flaca SOMMERS am serving as a scribe to document services personally performed  by Atif Allen DO based on my observation and the provider's statements to me. I, Atif Allen, attest that Flaca Baumann is acting in a scribe capacity, has observed my performance of the services and has documented them in accordance with my direction.    Atif Allen DO  Emergency Medicine  [facility] EMERGENCY ROOM  [address] [id number] [phone]     Atif Allen DO  04/29/23 0033

## 2023-04-29 NOTE — ED NOTES
Prior to procedure pt had what she felt was an allergic reaction to a different nurses perfume, pt was evaluated throat open and clear, pt vitals remained the same, pt given inhaler and was then ok and procedure was performed. Pt airway open and clear and vitals are within normal limits.

## 2023-04-29 NOTE — DISCHARGE INSTRUCTIONS
Your laboratory tests and chest x-ray results all appear reassuring here tonight in the emergency department.     A referral has been placed for you to follow-up in the cardiology clinic.  You will likely be contacted for an appointment within the next week.      Return back to ED for any worsening palpitations, chest pain or chest pressure, shortness breath, dizziness, or any other new or concerning symptoms.

## 2023-05-11 ENCOUNTER — OFFICE VISIT (OUTPATIENT)
Dept: CARDIOLOGY | Facility: CLINIC | Age: 63
End: 2023-05-11
Payer: COMMERCIAL

## 2023-05-11 VITALS
SYSTOLIC BLOOD PRESSURE: 138 MMHG | RESPIRATION RATE: 20 BRPM | WEIGHT: 225 LBS | HEART RATE: 84 BPM | BODY MASS INDEX: 37.49 KG/M2 | HEIGHT: 65 IN | DIASTOLIC BLOOD PRESSURE: 56 MMHG

## 2023-05-11 DIAGNOSIS — I48.91 NEW ONSET ATRIAL FIBRILLATION (H): ICD-10-CM

## 2023-05-11 PROCEDURE — 99204 OFFICE O/P NEW MOD 45 MIN: CPT | Performed by: INTERNAL MEDICINE

## 2023-05-11 RX ORDER — INSULIN LISPRO 100 [IU]/ML
10 INJECTION, SUSPENSION SUBCUTANEOUS
COMMUNITY

## 2023-05-11 RX ORDER — HUMAN INSULIN 100 [USP'U]/ML
25 INJECTION, SUSPENSION SUBCUTANEOUS 2 TIMES DAILY WITH MEALS
COMMUNITY

## 2023-05-11 RX ORDER — MULTIVITAMIN WITH FOLIC ACID 400 MCG
3 TABLET ORAL DAILY
COMMUNITY

## 2023-05-11 NOTE — LETTER
2023    [facility] [address]  [facility] [address]    RE: Jo-Ann Jeffery       Dear Colleague,     I had the pleasure of seeing Jo-Ann Jeffery in the [facility].     [facility]  Cardiac Electrophysiology  [address]  [place] [address]   Office: [phone]  Fax: [phone]     Cardiac Electrophysiology Consultation    Patient: Jo-Ann Jeffery   : 1960     Referring Provider: Frank Allen DO  Primary Care Provider: Shell Hassan CNP    CHIEF COMPLAINT/REASON FOR CONSULTATION  Paroxysmal atrial fibrillation    Assessment/Recommendations   Jo-Ann Jeffery is a 63 year old female with paroxysmal atrial fibrillation, multiple medical allergies and intolerances, T2DM, obesity, depression, referred by Dr. Allen for consultation regarding atrial fibrillation.    Paroxysmal atrial fibrillation - symptomatic with palpitations  [id number]  We reviewed atrial fibrillation physiology and management considerations including managing stroke risk, cardioversion, antiarrhythmic drug therapy, and catheter ablation.  We discussed atrial fibrillation ablation procedures, anticipated success rates, the potential need for re-do ablation vs addition of anti-arrhythmic drugs, procedural risks and recovery expectations.  She will focus on lifestyle modification and tracking AF episodes for now.  - TTE  - she will continue to follow for recurrent atrial fibrillation events, including via use of Apple Watch, and will contact us should these occur  - given her [id number] 2, the long term risk-benefit balance of therapeutic anticoagulation is indeterminate, and decision regarding continuation vs cessation should take into account bleeding diatheses, stroke risk reduction and patient preference.  We discussed these elements today - she elects to not initiate anticoagulation  - we discussed the ongoing importance of lifestyle  "modification (maintaining a healthy weight, sleep apnea diagnosis and management, alcohol avoidance) as part of a long term strategy for atrial fibrillation management    Follow up: as above - we will follow-up on TTE results when available.  She will otherwise continue to follow with her established care team, with future EP follow-up as needed         History of Present Illness   Jo-Ann Jeffery is a 63 year old female with paroxysmal atrial fibrillation, multiple medical allergies and intolerances, T2DM, obesity, depression, referred by Dr. Allen for consultation regarding atrial fibrillation.    Mrs. Jeffery notes onset of palpitations, chest tightness after showering on 4/28/2023 and underwent ER evaluation with note of atrial fibrillation with rapid ventricular rates - she underwent DCCV and declined anticoagulation initiation at that time.  She has since been recording daily HR and BP - no known recurrence of atrial fibrillation.    She denies dyspnea, presyncope or syncope.  Her  (who has KIMBERLY) notes that she does not snore (unless she has a cold) or have apneic episodes.       Physical Examination  Review of Systems   VITALS: /56 (BP Location: Right arm, Patient Position: Sitting, Cuff Size: Adult Large)   Pulse 84   Resp 20   Ht 1.651 m (5' 5\")   Wt 102.1 kg (225 lb)   BMI 37.44 kg/m    Wt Readings from Last 3 Encounters:   04/14/23 99.8 kg (220 lb)   12/30/22 98 kg (216 lb)   11/25/22 98.7 kg (217 lb 9.6 oz)     CONSTITUTIONAL: well nourished, comfortable, no distress  EYES:  Conjunctivae pink, sclerae clear.    E/N/T:  Oral mucosa pink  RESPIRATORY:  Respiratory effort is normal  CARDIOVASCULAR:  normal S1 and S2  GASTROINTESTINAL:  Abdomen without masses or tenderness  EXTREMITIES:  No clubbing or cyanosis.    MUSCULOSKELETAL:  Overall grossly normal muscle strength  SKIN:  Overall, skin warm and dry, no lesions.  NEURO/PSYCH:  Oriented x 3 with normal affect.   Constitutional:  No " weight loss or loss of appetite    Eyes:  No difficulty with vision, no double vision, no dry eyes  ENT:  No sore throat, difficulty swallowing; changes in hearing or tinnitus  Cardiovascular: As detailed above  Respiratory:  No cough  Musculoskeletal  No joint pain, muscle aches  Neurologic:  No syncope, lightheadedness, fainting spells   Hematologic: No easy bruising, excessive bleeding tendency   Gastrointestinal:  No jaundice, abdominal pain or abdominal bloating  Genitourinary: No changes in urinary habits, no trouble urinating    Psychiatric: No anxiety or depression      Medical History  Surgical History   Past Medical History:   Diagnosis Date    Alcohol dependence in remission (H) 2/21/2003    ALCOH DEP NEC/NOS-REMISS(aka ALCOHOL)     Cervical Disc Degeneration     Created by Conversion     Depressive disorder     Diabetes mellitus, type 2 (H)     Created by Conversion     Hyperlipidemia     Created by Conversion     Lumbar Disc Degeneration     Created by Conversion     Pancreatitis 8/25/2021    Past Surgical History:   Procedure Laterality Date    ENDOSCOPIC RETROGRADE CHOLANGIOPANCREATOGRAM N/A 8/30/2021    Procedure: ENDOSCOPIC RETROGRADE CHOLANGIOPANCREATOGRAPHY WITH SPHINCTEROTOMY;  Surgeon: Stephen Rivas MD;  Location: [facility] Main OR         Family History Social History   Family History   Problem Relation Age of Onset    Syncope Mother     Hypertension Mother     Cerebrovascular Disease Father 62    Diabetes Father     Cerebrovascular Disease Brother 63    Gallbladder Disease Maternal Grandmother     Breast Cancer Maternal Grandmother     Gallbladder Disease Maternal Aunt     Throat cancer Maternal Aunt         Social History     Tobacco Use    Smoking status: Never    Smokeless tobacco: Never   Substance Use Topics    Alcohol use: Not Currently         Medications  Allergies     Current Outpatient Medications:     albuterol (PROAIR HFA/PROVENTIL HFA/VENTOLIN HFA) 108 (90 Base) MCG/ACT  inhaler, Inhale 2 puffs into the lungs every 6 hours as needed for shortness of breath / dyspnea or wheezing (Patient not taking: Reported on 11/1/2021), Disp: , Rfl:     calcium-magnesium (CALMAG) 500-250 MG TABS per tablet, Take 2 tablets by mouth every evening, Disp: , Rfl:     latanoprost (XALATAN) 0.005 % ophthalmic solution, Place 1 drop into both eyes At Bedtime, Disp: , Rfl:     multivitamin w/minerals (THERA-VIT-M) tablet, Take 1 tablet by mouth daily, Disp: , Rfl:     vitamin D3 (CHOLECALCIFEROL) 50 mcg (2000 units) tablet, Take 50 mcg by mouth daily 3 gummies = 2000 units , Disp: , Rfl:     Zinc Oxide-Vitamin C (ZINC PLUS VITAMIN C PO), Take 3 tablets by mouth daily , Disp: , Rfl:      Allergies   Allergen Reactions    Azithromycin Hives    Cephalexin Hives    Contrast Dye Shortness Of Breath    Erythromycin Hives     hives      Iron Anaphylaxis    Latex Hives     hands break out with latex gloves    Moxifloxacin Anaphylaxis    Penicillins Hives     nausea      Soap Hives     Tide soap, Soft soap, Hien Dish soap    Sulfamethoxazole-Trimethoprim Nausea    Aminoglycosides Other (See Comments)     ear drops-ears swell up inside  ear drops-ears swell up inside    Amitriptyline     Anesthetics, Amide     Antipyrine-Benzocaine     Cefprozil Hives    Chocolate Flavor      Annotation: pudding      Ciprofloxacin Hives    Clindamycin Other (See Comments)     Severe diarrhea    Clonazepam      Hives      Dairy Products [Milk Protein]      Tachycardia      Diphenhydramine Other (See Comments)     Racing heart    Fluoxetine      Hives      Glipizide Other (See Comments)     Muscle pain/muscle twitching    Guaifenesin & Derivatives      Hypertension    Hydrocodone-Acetaminophen     Iodides      swells up, burns     Kiwi     Lactulose Nausea and Vomiting    Loperamide     Memantine     Metformin Headache    Methylprednisolone Nausea and Swelling    Metoclopramide     Morphine     Neomycin      ear drops-ears swell up  inside    Prochlorperazine     Pseudoephedrine-Guaifenesin     Quinolones      Other reaction      Sodium Chloride Swelling and Other (See Comments)    Tartrazine Difficulty breathing    Acetic Acid Rash    Antipyrine Rash    Imidazole Antifungals Rash and Unknown    Nuts Palpitations     Chocolate and butterscottch, strawberries, walnuts          Lab Results    Chemistry CBC Cardiac Enzymes/BNP/TSH/INR   Recent Labs   Lab Test 04/28/23  1854 04/28/23  1723   NA  --  139   POTASSIUM  --  4.1   CHLORIDE  --  104   CO2  --  26   * 243*   BUN  --  17   CR  --  0.91   GFRESTIMATED  --  71   LAKEISHA  --  9.8     Recent Labs   Lab Test 04/28/23  1723 04/14/23  0436 12/30/22  1444   CR 0.91 0.74 0.83          Recent Labs   Lab Test 04/28/23  1723   WBC 11.4*   HGB 12.4   HCT 38.4   MCV 85        Recent Labs   Lab Test 04/28/23  1723 04/14/23  0436 12/30/22  1444   HGB 12.4 11.1* 11.6*    Recent Labs   Lab Test 04/28/23  1723 08/27/22  0815   TROPONINI 0.03 0.02     No results for input(s): BNP, NTBNPI, NTBNP in the last 58050 hours.  Recent Labs   Lab Test 04/28/23  1723   TSH 2.00     Recent Labs   Lab Test 04/28/23  1723   INR 0.95         Data Review    ECGs (tracings independently reviewed)  4/28/2023 (post DCCV) - SR 91bpm  4/28/2023 - AF, ventricular rate 167bpm  4/14/2023 - SR 90bpm       Cc: Sonya Amos DO, Sheri, CNP    Chinyere Newton MD  5/11/2023  4:26 PM          Thank you for allowing me to participate in the care of your patient.      Sincerely,     Chinyere Newton MD     [facility]  cc:   No referring provider defined for this encounter.

## 2023-05-11 NOTE — PATIENT INSTRUCTIONS
[facility]  Cardiac Electrophysiology  [address]  [place] [address]   Office: [phone]  Fax: [phone]       Thank you for seeing us in clinic today - it is a pleasure to be a part of your care team.  Below is a summary of our plan from today's visit.       You have very recently diagnosed paroxysmal atrial fibrillation.  We reviewed physiology and management options including lifestyle modification, antiarrhythmic drug therapy, catheter ablation.    We will plan for the following:  - echocardiogram  - focus on lifestyle modification (for example, maintaining a healthy weight, alcohol avoidance) as part of a long term strategy for atrial fibrillation management  - continue to follow for recurrent atrial fibrillation events, including via use of your Apple Watch - contact us should these occur     Please do not hesitate to be in touch with our office at [phone] with any questions that may arise.       Thank you for trusting us with your care,    Chinyere Newton MD  Clinical Cardiac Electrophysiology  [facility]  [address]  [place] [address]   Office: [phone]  Fax: [phone]            ATRIAL FIBRILLATION: Patient Information    What is atrial fibrillation?  Atrial fibrillation (AF, A-fib) is a common heart rhythm problem (arrhythmia) occurring within the upper chambers of the heart (the atria).  In normal rhythm, the upper and lower chambers of the heart are electrically driven to contract in a coordinated sequence.  In atrial fibrillation, the atria lose their ability to contract because of rapid and chaotic electrical activity.  The lower chambers of the heart (the ventricles) continue to pump blood throughout the body, though with irregular and often faster rate due to the chaotic activity within the atria.        How do I know if I have atrial fibrillation?   Some people may feel their heart beating  faster, harder, or irregularly while in atrial fibrillation.  Others may be lightheaded, fatigued, feel weak or tired or become more short of breath especially with activities.  Some patients have no symptoms at all.  Atrial fibrillation may be found due to an irregular pulse or on an electrocardiogram (ECG). Atrial fibrillation can start and stop on its own, and episodes can last from seconds to several months.      How common is atrial fibrillation?   An estimated 3-6 million people in the [place] have atrial fibrillation.  Atrial fibrillation is a common heart rhythm problem for older persons, affecting as estimated 12-15% of people over the age of 65 years of age.    What causes atrial fibrillation?   Age is the most important risk factor for atrial fibrillation.  Atrial fibrillation is more common in people with other heart disease, high blood pressure, diabetes, obesity, sleep apnea and in people who regularly consume alcohol.  Surgery, lung disease, or thyroid problems can lead to atrial fibrillation.  Atrial fibrillation has multiple possible causes, and in most cases a single cause cannot be found.  Atrial fibrillation is a progressive condition, usually starting with at an early stage with short and infrequent episodes.  In later stages of disease, more frequent and longer lasting episodes of atrial fibrillation occur, ultimately culminating in episodes which do not spontaneously terminate.  Generally, more enlargement and scarring within the upper chambers of the heart is observed as atrial fibrillation progresses from early to late-stage disease.    How is atrial fibrillation diagnosed and evaluated?    Because of its start-stop nature, atrial fibrillation can be challenging to diagnose.  Atrial fibrillation is most commonly diagnosed via cardiac rhythm recordings - either an ECG or wearable cardiac rhythm monitor.  For patients with pacemakers, defibrillators or implantable loop recorders, atrial  fibrillation may be recorded via these devices.  Recently, commercially available devices (eg. Apple Watch, MessageGears device, certain FitBit devices, others) can allow patients to take 30 second cardiac rhythm recordings which may document atrial fibrillation.  Once atrial fibrillation is diagnosed, additional tests include blood tests and an echocardiogram.  The echocardiogram uses ultrasound to look at your heart to assess your cardiac function and evaluate for other heart disease.  Additional evaluation may include CT or MRI studies.    Is atrial fibrillation dangerous?   Atrial fibrillation is not usually a life-threatening arrhythmia.  The most serious consequences of atrial fibrillation including stroke and worsening of overall cardiac function.  While in atrial fibrillation, the upper cardiac chambers do not contract normally, resulting in slower blood flow and increased risk of clot formation.  If this blood clot becomes detached from the heart a stroke can occur.  Unfortunately, stroke can be the first sign of atrial fibrillation for some people.  With a stroke, you may notice abnormal sensation, weakness on one side of the body or face, changes in your vision or speech.  If you have any of these signs, you should contact EMS and be evaluated in an emergency room as soon as possible.      How is atrial fibrillation treated?     Several treatment options exist for suppressing atrial fibrillation - however, it is not an easily curable arrhythmia.  The first goal in managing atrial fibrillation is to minimize stroke risk.  The second goal is to improve symptoms associated with atrial fibrillation.  Finally, in patients with reduced cardiac function, maintaining normal rhythm can help improve cardiac function.      Blood thinners are used to reduce the risk of stroke in patients with high estimated stroke risk related to atrial fibrillation.  For patients at higher risk of bleeding related to blood thinner use,  implantable devices may be an option to reduce stroke risk without the need for long term blood thinner use.      Atrial fibrillation can be managed via two strategies: rate control and rhythm control.  In a rate control strategy, continued atrial fibrillation is accepted and medications (eg. beta-blockers or calcium channel blockers) are used to control the lower chamber rate.  In a rhythm control strategy, anti-arrhythmic medications or catheter ablation are used to maintain normal cardiac rhythm and slow disease progression by suppressing atrial fibrillation.  A procedure called a cardioversion, in which an electric shock is delivered through patches placed on the chest wall while under deep sedation, can be performed to temporarily restore normal cardiac rhythm, though does not address the chance of atrial fibrillation recurrence.  Treatments are more effective for earlier rather than later stage atrial fibrillation.  Lifestyle modifications (maintaining a healthy weight, aerobic exercise, diagnosing and treating sleep apnea, and minimizing alcohol intake) are important elements of atrial fibrillation rhythm control.     What is catheter ablation for atrial fibrillation?  Cardiac catheter ablation is a commonly performed, minimally invasive procedure performed by a cardiac electrophysiologist to treat many different cardiac rhythm abnormalities.  During catheter ablation, long, thin, flexible tubes are advanced into the heart via small sheaths inserted into the femoral veins and thermal energy (either heating or cooling) is applied within the heart to disrupt abnormal electrical activity.  Atrial fibrillation ablation is performed under general anesthesia, with procedures generally taking approximately 2-3 hours.  Patients are typically observed for 3-5 hours after the ablation, and in most cases can be discharged home the same day.  Atrial fibrillation ablation is associated with better outcomes (mortality,  cardiovascular hospitalizations, atrial arrhythmia recurrences) compared to antiarrhythmic drug therapy.  However, atrial fibrillation recurrences are not uncommon, and repeat catheter ablation may be offered.  Your electrophysiology team can review atrial fibrillation ablation, anticipated success rates, risks, and recovery expectations with you.    What are anti-arrhythmic medications?  Anti-arrhythmic medications are specialized drugs which alter cardiac electrical functioning to suppress arrhythmias.  There are several anti-arrhythmic medications available, each with its own success rate and side effects.  Some anti-arrhythmic medications are less effective though safer to use, others are more effective though have serious potential toxicities.  Atrial fibrillation recurrences are common and may require dose adjustment or change in antiarrhythmic therapy.  Your electrophysiology team will carefully consider which medication would be the best and safest for your particular case.      Can I live a normal life?    The goal of atrial fibrillation management is for patients to live normal lives without being limited by symptoms related to atrial fibrillation.    Are any additional educational resources available?  There are a number of excellent atrial fibrillation education resources available to you online.  A few options you may wish to review include:  hrsonline.org/guide-atrial-fibrillation  afibmatters.org  getsmartaboutafib.com  stopaf.com    What comes next?    Consider your management options and let us know how we can help in your decision process.  Please take medications as they have been prescribed.  You should also get any tests that may have been ordered for you.      When to Call Your Doctor or seek emergency care:  Call your doctor or seek emergency care if you have any significant changes with the following:  Weakness  Dizziness  Fainting  Fatigue  Shortness of breath  Chest pain with increased  activity  If you are concerned that your heart rate is too fast or too slow  Bleeding that does not stop in 10 minutes  Coughing or throwing up blood  Bloody diarrhea or bleeding hemorrhoids  Dark-colored urine or black stool  Allergic reactions:  Rash  Itching  Swelling  Trouble breathing or swallowing      Please call the [facility] at [phone] if you have concerns about your symptoms, your medicines, or your follow-up appointments.

## 2023-05-11 NOTE — PROGRESS NOTES
[facility]  Cardiac Electrophysiology  [address]  [place] [address]   Office: [phone]  Fax: [phone]     Cardiac Electrophysiology Consultation    Patient: Jo-Ann Jeffery   : 1960     Referring Provider: Frank Allen DO  Primary Care Provider: Shell Hassan CNP    CHIEF COMPLAINT/REASON FOR CONSULTATION  Paroxysmal atrial fibrillation    Assessment/Recommendations   Jo-Ann Jeffery is a 63 year old female with paroxysmal atrial fibrillation, multiple medical allergies and intolerances, T2DM, obesity, depression, referred by Dr. Allen for consultation regarding atrial fibrillation.    Paroxysmal atrial fibrillation - symptomatic with palpitations  [id number] 2  We reviewed atrial fibrillation physiology and management considerations including managing stroke risk, cardioversion, antiarrhythmic drug therapy, and catheter ablation.  We discussed atrial fibrillation ablation procedures, anticipated success rates, the potential need for re-do ablation vs addition of anti-arrhythmic drugs, procedural risks and recovery expectations.  She will focus on lifestyle modification and tracking AF episodes for now.  - TTE  - she will continue to follow for recurrent atrial fibrillation events, including via use of Apple Watch, and will contact us should these occur  - given her [id number] 2, the long term risk-benefit balance of therapeutic anticoagulation is indeterminate, and decision regarding continuation vs cessation should take into account bleeding diatheses, stroke risk reduction and patient preference.  We discussed these elements today - she elects to not initiate anticoagulation  - we discussed the ongoing importance of lifestyle modification (maintaining a healthy weight, sleep apnea diagnosis and management, alcohol avoidance) as part of a long term strategy for atrial fibrillation management    Follow up: as above - we will follow-up on TTE results when  "available.  She will otherwise continue to follow with her established care team, with future EP follow-up as needed         History of Present Illness   Jo-Ann Jeffery is a 63 year old female with paroxysmal atrial fibrillation, multiple medical allergies and intolerances, T2DM, obesity, depression, referred by Dr. Allen for consultation regarding atrial fibrillation.    Mrs. Jeffery notes onset of palpitations, chest tightness after showering on 4/28/2023 and underwent ER evaluation with note of atrial fibrillation with rapid ventricular rates - she underwent DCCV and declined anticoagulation initiation at that time.  She has since been recording daily HR and BP - no known recurrence of atrial fibrillation.    She denies dyspnea, presyncope or syncope.  Her  (who has KIMBERLY) notes that she does not snore (unless she has a cold) or have apneic episodes.       Physical Examination  Review of Systems   VITALS: /56 (BP Location: Right arm, Patient Position: Sitting, Cuff Size: Adult Large)   Pulse 84   Resp 20   Ht 1.651 m (5' 5\")   Wt 102.1 kg (225 lb)   BMI 37.44 kg/m    Wt Readings from Last 3 Encounters:   04/14/23 99.8 kg (220 lb)   12/30/22 98 kg (216 lb)   11/25/22 98.7 kg (217 lb 9.6 oz)     CONSTITUTIONAL: well nourished, comfortable, no distress  EYES:  Conjunctivae pink, sclerae clear.    E/N/T:  Oral mucosa pink  RESPIRATORY:  Respiratory effort is normal  CARDIOVASCULAR:  normal S1 and S2  GASTROINTESTINAL:  Abdomen without masses or tenderness  EXTREMITIES:  No clubbing or cyanosis.    MUSCULOSKELETAL:  Overall grossly normal muscle strength  SKIN:  Overall, skin warm and dry, no lesions.  NEURO/PSYCH:  Oriented x 3 with normal affect.   Constitutional:  No weight loss or loss of appetite    Eyes:  No difficulty with vision, no double vision, no dry eyes  ENT:  No sore throat, difficulty swallowing; changes in hearing or tinnitus  Cardiovascular: As detailed above  Respiratory:  No " cough  Musculoskeletal  No joint pain, muscle aches  Neurologic:  No syncope, lightheadedness, fainting spells   Hematologic: No easy bruising, excessive bleeding tendency   Gastrointestinal:  No jaundice, abdominal pain or abdominal bloating  Genitourinary: No changes in urinary habits, no trouble urinating    Psychiatric: No anxiety or depression      Medical History  Surgical History   Past Medical History:   Diagnosis Date     Alcohol dependence in remission (H) 2/21/2003    ALCOH DEP NEC/NOS-REMISS(aka ALCOHOL)      Cervical Disc Degeneration     Created by Conversion      Depressive disorder      Diabetes mellitus, type 2 (H)     Created by Conversion      Hyperlipidemia     Created by Conversion      Lumbar Disc Degeneration     Created by Conversion      Pancreatitis 8/25/2021    Past Surgical History:   Procedure Laterality Date     ENDOSCOPIC RETROGRADE CHOLANGIOPANCREATOGRAM N/A 8/30/2021    Procedure: ENDOSCOPIC RETROGRADE CHOLANGIOPANCREATOGRAPHY WITH SPHINCTEROTOMY;  Surgeon: Stephen Rivas MD;  Location: [facility] Main OR         Family History Social History   Family History   Problem Relation Age of Onset     Syncope Mother      Hypertension Mother      Cerebrovascular Disease Father 62     Diabetes Father      Cerebrovascular Disease Brother 63     Gallbladder Disease Maternal Grandmother      Breast Cancer Maternal Grandmother      Gallbladder Disease Maternal Aunt      Throat cancer Maternal Aunt         Social History     Tobacco Use     Smoking status: Never     Smokeless tobacco: Never   Substance Use Topics     Alcohol use: Not Currently         Medications  Allergies     Current Outpatient Medications:      albuterol (PROAIR HFA/PROVENTIL HFA/VENTOLIN HFA) 108 (90 Base) MCG/ACT inhaler, Inhale 2 puffs into the lungs every 6 hours as needed for shortness of breath / dyspnea or wheezing (Patient not taking: Reported on 11/1/2021), Disp: , Rfl:      calcium-magnesium (CALMAG) 500-250  MG TABS per tablet, Take 2 tablets by mouth every evening, Disp: , Rfl:      latanoprost (XALATAN) 0.005 % ophthalmic solution, Place 1 drop into both eyes At Bedtime, Disp: , Rfl:      multivitamin w/minerals (THERA-VIT-M) tablet, Take 1 tablet by mouth daily, Disp: , Rfl:      vitamin D3 (CHOLECALCIFEROL) 50 mcg (2000 units) tablet, Take 50 mcg by mouth daily 3 gummies = 2000 units , Disp: , Rfl:      Zinc Oxide-Vitamin C (ZINC PLUS VITAMIN C PO), Take 3 tablets by mouth daily , Disp: , Rfl:      Allergies   Allergen Reactions     Azithromycin Hives     Cephalexin Hives     Contrast Dye Shortness Of Breath     Erythromycin Hives     hives       Iron Anaphylaxis     Latex Hives     hands break out with latex gloves     Moxifloxacin Anaphylaxis     Penicillins Hives     nausea       Soap Hives     Tide soap, Soft soap, Hien Dish soap     Sulfamethoxazole-Trimethoprim Nausea     Aminoglycosides Other (See Comments)     ear drops-ears swell up inside  ear drops-ears swell up inside     Amitriptyline      Anesthetics, Amide      Antipyrine-Benzocaine      Cefprozil Hives     Chocolate Flavor      Annotation: pudding       Ciprofloxacin Hives     Clindamycin Other (See Comments)     Severe diarrhea     Clonazepam      Hives       Dairy Products [Milk Protein]      Tachycardia       Diphenhydramine Other (See Comments)     Racing heart     Fluoxetine      Hives       Glipizide Other (See Comments)     Muscle pain/muscle twitching     Guaifenesin & Derivatives      Hypertension     Hydrocodone-Acetaminophen      Iodides      swells up, burns      Kiwi      Lactulose Nausea and Vomiting     Loperamide      Memantine      Metformin Headache     Methylprednisolone Nausea and Swelling     Metoclopramide      Morphine      Neomycin      ear drops-ears swell up inside     Prochlorperazine      Pseudoephedrine-Guaifenesin      Quinolones      Other reaction       Sodium Chloride Swelling and Other (See Comments)     Tartrazine  Difficulty breathing     Acetic Acid Rash     Antipyrine Rash     Imidazole Antifungals Rash and Unknown     Nuts Palpitations     Chocolate and butterscottch, strawberries, walnuts          Lab Results    Chemistry CBC Cardiac Enzymes/BNP/TSH/INR   Recent Labs   Lab Test 04/28/23  1854 04/28/23  1723   NA  --  139   POTASSIUM  --  4.1   CHLORIDE  --  104   CO2  --  26   * 243*   BUN  --  17   CR  --  0.91   GFRESTIMATED  --  71   LAKEISHA  --  9.8     Recent Labs   Lab Test 04/28/23  1723 04/14/23  0436 12/30/22  1444   CR 0.91 0.74 0.83          Recent Labs   Lab Test 04/28/23  1723   WBC 11.4*   HGB 12.4   HCT 38.4   MCV 85        Recent Labs   Lab Test 04/28/23  1723 04/14/23  0436 12/30/22  1444   HGB 12.4 11.1* 11.6*    Recent Labs   Lab Test 04/28/23  1723 08/27/22  0815   TROPONINI 0.03 0.02     No results for input(s): BNP, NTBNPI, NTBNP in the last 56204 hours.  Recent Labs   Lab Test 04/28/23  1723   TSH 2.00     Recent Labs   Lab Test 04/28/23  1723   INR 0.95         Data Review    ECGs (tracings independently reviewed)  4/28/2023 (post DCCV) - SR 91bpm  4/28/2023 - AF, ventricular rate 167bpm  4/14/2023 - SR 90bpm       Cc: Frank Allen DO, Shell Hassan, MIRTHA Newton MD  5/11/2023  4:26 PM

## 2023-08-20 ENCOUNTER — HEALTH MAINTENANCE LETTER (OUTPATIENT)
Age: 63
End: 2023-08-20

## 2023-10-29 ENCOUNTER — HEALTH MAINTENANCE LETTER (OUTPATIENT)
Age: 63
End: 2023-10-29

## 2024-01-07 ENCOUNTER — HEALTH MAINTENANCE LETTER (OUTPATIENT)
Age: 64
End: 2024-01-07

## 2024-05-26 ENCOUNTER — HEALTH MAINTENANCE LETTER (OUTPATIENT)
Age: 64
End: 2024-05-26

## 2024-06-05 NOTE — ANESTHESIA CARE TRANSFER NOTE
Patient: Jo-Ann Jeffery    Procedure(s):  ENDOSCOPIC RETROGRADE CHOLANGIOPANCREATOGRAPHY WITH SPHINCTEROTOMY    Diagnosis: Gallstone pancreatitis [K85.10]  Diagnosis Additional Information: No value filed.    Anesthesia Type:   General     Note:    Oropharynx: oropharynx clear of all foreign objects  Level of Consciousness: drowsy  Oxygen Supplementation: face mask    Independent Airway: airway patency satisfactory and stable  Dentition: dentition unchanged  Vital Signs Stable: post-procedure vital signs reviewed and stable  Report to RN Given: handoff report given  Patient transferred to: PACU  Comments: Patient spontaneously breathing.  Tidal volumes > 400ml.  Following commands, suctioned and extubated with balloon down.  O2 via mask at 6L.  To PACU, VSS, SBAR report to RN per institutional handoff policy and procedure.Transfer of care.  Handoff Report: Identifed the Patient, Identified the Reponsible Provider, Reviewed the pertinent medical history, Discussed the surgical course, Reviewed Intra-OP anesthesia mangement and issues during anesthesia, Set expectations for post-procedure period and Allowed opportunity for questions and acknowledgement of understanding      Vitals:  Vitals Value Taken Time   /79 08/30/21 1331   Temp 36.8  C (98.2  F) 08/30/21 1331   Pulse 97 08/30/21 1331   Resp 18 08/30/21 1331   SpO2         Electronically Signed By: KELL Finnegan CRNA  August 30, 2021  1:33 PM  
08:30

## 2024-10-13 ENCOUNTER — HEALTH MAINTENANCE LETTER (OUTPATIENT)
Age: 64
End: 2024-10-13

## 2025-01-25 ENCOUNTER — HEALTH MAINTENANCE LETTER (OUTPATIENT)
Age: 65
End: 2025-01-25

## 2025-02-22 ENCOUNTER — HEALTH MAINTENANCE LETTER (OUTPATIENT)
Age: 65
End: 2025-02-22

## 2025-03-25 ENCOUNTER — HOSPITAL ENCOUNTER (EMERGENCY)
Facility: CLINIC | Age: 65
Discharge: HOME | End: 2025-03-25
Attending: EMERGENCY MEDICINE | Admitting: EMERGENCY MEDICINE
Payer: COMMERCIAL

## 2025-03-25 VITALS
OXYGEN SATURATION: 96 % | HEART RATE: 84 BPM | WEIGHT: 234.1 LBS | BODY MASS INDEX: 39 KG/M2 | RESPIRATION RATE: 18 BRPM | TEMPERATURE: 98.6 F | SYSTOLIC BLOOD PRESSURE: 170 MMHG | HEIGHT: 65 IN | DIASTOLIC BLOOD PRESSURE: 83 MMHG

## 2025-03-25 DIAGNOSIS — M54.16 LUMBAR RADICULOPATHY: ICD-10-CM

## 2025-03-25 PROCEDURE — 99283 EMERGENCY DEPT VISIT LOW MDM: CPT

## 2025-03-25 RX ORDER — DIAZEPAM 2 MG/1
2 TABLET ORAL EVERY 8 HOURS PRN
Qty: 9 TABLET | Refills: 0 | Status: SHIPPED | OUTPATIENT
Start: 2025-03-25

## 2025-03-25 ASSESSMENT — COLUMBIA-SUICIDE SEVERITY RATING SCALE - C-SSRS
1. IN THE PAST MONTH, HAVE YOU WISHED YOU WERE DEAD OR WISHED YOU COULD GO TO SLEEP AND NOT WAKE UP?: NO
2. HAVE YOU ACTUALLY HAD ANY THOUGHTS OF KILLING YOURSELF IN THE PAST MONTH?: NO
6. HAVE YOU EVER DONE ANYTHING, STARTED TO DO ANYTHING, OR PREPARED TO DO ANYTHING TO END YOUR LIFE?: NO

## 2025-03-25 NOTE — DISCHARGE INSTRUCTIONS
Take valium for pain/spasm. Follow up as scheduled with spine specialist at [facility] on 4/1/25.

## 2025-03-25 NOTE — ED PROVIDER NOTES
Emergency Department Encounter     Evaluation Date & Time:   No admission date for patient encounter.    CHIEF COMPLAINT:  Leg Pain      Triage Note:       ED COURSE & MEDICAL DECISION MAKING:     Pt here with chronic right low back/leg pain for the past year. Pt worried that pain is actually a torn hamstring based on her pain, which starts in right low back, radiates down back of leg to foot at times.  Pain worse with movement.  Pt in PT for past year, diligent with stretching. Due to 48 allergies, does not tolerate much, including steroids.  Pt has spine follow up with [facility] on 4/1/25.  Pt wondering if she needs MRI or CT of her hamstring. Discussed with her symptoms are consistent with known lumbar radiculopathy. She tolerates valium for pain/spasm, so giving a short course of this.  Advised importance of follow up with spine and pt agreeable.  Unfortunately, unable to prescribe prednisone due to her allergy.     Review of EMR shows that pt was seen on 3/22/25 with [facility] for same symptoms with MRI L spine showing:   IMPRESSION:   1. At the L1/L2 level, there is a symmetric disc bulge and posterior annular fissure with a small superimposed focal central disc protrusion and dorsal epidural lipomatosis which contributes to moderate thecal sac stenosis.   2.  Additional mild degenerative lumbar spondylosis with level by level analysis as described above without evidence of high-grade spinal canal or neural foraminal narrowing at any level.   The emergency department team that day spoke with neurosurgery regarding MRI findings, who reviewed everything per note and no need for surgical intervention, recommended outpatient follow up, so referral placed at that time with their neurosurgery team.  Pt has appt on 4/1/25 with [facility], as well as referral to [facility] team.         Medical Decision Making    History:  Supplemental history from:  none  External Record(s) reviewed:  ED visit  3/22/25, MRI 2/2024    Work Up:  Chart documentation includes differential considered and any EKGs or imaging independently interpreted by provider, where specified.  In additional to work up documented, I considered the following work up: Other:      External consultation:  Discussion of management with another provider: Documented in chart, if applicable    Complicating factors:  Care impacted by chronic illness: Chronic Pain and Diabetes  Care affected by social determinants of health: N/A    Disposition considerations: Discharge. I prescribed additional prescription strength medication(s) as charted. See documentation for any additional details.    Not Applicable     None    At the conclusion of the encounter I discussed the results of all the tests and the disposition. The questions were answered. The patient or family acknowledged understanding and was agreeable with the care plan.      MEDICATIONS GIVEN IN THE EMERGENCY DEPARTMENT:  Medications - No data to display    NEW PRESCRIPTIONS STARTED AT TODAY'S ED VISIT:  New Prescriptions    DIAZEPAM (VALIUM) 2 MG TABLET    Take 1 tablet (2 mg) by mouth every 8 hours as needed for agitation, muscle spasms or pain.       HPI   HPI     Jo-Ann Jeffery is a 65 year old female with a pertinent history of DM, lumbar disc degeneration, chronic low back pain who presents to this ED via walk-in for evaluation of ongoing right leg pain.  Pain starts in right low back/buttock, radiates down back of leg and as far as foot. Started in February of 2024.  Pt has been seen multiple times, including 3 days ago at [facility] with MRI.  Pt has follow up with [facility] on 4/1/25.  She has numerous allergies, doesn't tolerate narcotics, steroids, nsaids.  Pt doing PT for past year with minimal relief. Primarily here now as she wonders if pain is actually a torn hamstring and wondered if she should get an MRI or CT of her leg.  No acute changes otherwise.  No saddle anesthesia,  "incontinence/retention, fevers, new trauma.      REVIEW OF SYSTEMS:  See HPI      Medical History     Past Medical History:   Diagnosis Date    Alcohol dependence in remission (H) 2/21/2003    Cervical Disc Degeneration     Depressive disorder     Diabetes mellitus, type 2 (H)     Hyperlipidemia     Lumbar Disc Degeneration     Pancreatitis 8/25/2021       Past Surgical History:   Procedure Laterality Date    ENDOSCOPIC RETROGRADE CHOLANGIOPANCREATOGRAM N/A 8/30/2021    Procedure: ENDOSCOPIC RETROGRADE CHOLANGIOPANCREATOGRAPHY WITH SPHINCTEROTOMY;  Surgeon: Stephen Rivas MD;  Location: [facility] Main OR       Family History   Problem Relation Age of Onset    Syncope Mother     Hypertension Mother     Cerebrovascular Disease Father 62    Diabetes Father     Cerebrovascular Disease Brother 63    Gallbladder Disease Maternal Grandmother     Breast Cancer Maternal Grandmother     Gallbladder Disease Maternal Aunt     Throat cancer Maternal Aunt        Social History     Tobacco Use    Smoking status: Never    Smokeless tobacco: Never   Substance Use Topics    Alcohol use: Not Currently       albuterol (PROAIR HFA/PROVENTIL HFA/VENTOLIN HFA) 108 (90 Base) MCG/ACT inhaler  calcium-magnesium (CALMAG) 500-250 MG TABS per tablet  Cyanocobalamin ([employer] B12 GUMMIES) 500 MCG CHEW  diazepam (VALIUM) 2 MG tablet  insulin lispro prot & lispro (HUMALOG MIX 50/50 VIAL) (50-50) 100 UNIT/ML vial  insulin NPH-Regular 70/30 (NOVOLIN MIX 70/30 VIAL) (70-30) 100 UNIT/ML vial  insulin regular 100 UNIT/ML vial  latanoprost (XALATAN) 0.005 % ophthalmic solution  multivitamin w/minerals (THERA-VIT-M) tablet  vitamin D3 (CHOLECALCIFEROL) 50 mcg (2000 units) tablet  Zinc Oxide-Vitamin C (ZINC PLUS VITAMIN C PO)        Physical Exam     Vitals:  BP (!) 170/83   Pulse 84   Temp 98.6  F (37  C) (Oral)   Resp 18   Ht 1.651 m (5' 5\")   Wt 106.2 kg (234 lb 1.6 oz)   SpO2 96%   BMI 38.96 kg/m      PHYSICAL EXAM:   Physical Exam  Vitals " and nursing note reviewed.   Constitutional:       General: She is not in acute distress.     Appearance: Normal appearance.   HENT:      Head: Normocephalic and atraumatic.      Nose: Nose normal.      Mouth/Throat:      Mouth: Mucous membranes are moist.   Eyes:      Pupils: Pupils are equal, round, and reactive to light.   Cardiovascular:      Rate and Rhythm: Normal rate and regular rhythm.      Pulses: Normal pulses.           Radial pulses are 2+ on the right side and 2+ on the left side.        Dorsalis pedis pulses are 2+ on the right side and 2+ on the left side.   Pulmonary:      Effort: Pulmonary effort is normal. No respiratory distress.      Breath sounds: Normal breath sounds.   Abdominal:      Palpations: Abdomen is soft.      Tenderness: There is no abdominal tenderness.   Musculoskeletal:      Cervical back: Full passive range of motion without pain and neck supple.      Comments: No calf tenderness or swelling b/l   Skin:     General: Skin is warm.      Findings: No rash.   Neurological:      General: No focal deficit present.      Mental Status: She is alert. Mental status is at baseline.      Sensory: Sensation is intact.      Comments: Fluent speech, no acute lateralizing deficits  Antalgic gait, 5/5 strength b/l hip flexors and dorsi/plantarflexion  Sensation grossly intact   Psychiatric:         Mood and Affect: Mood normal.         Behavior: Behavior normal.           Results     LAB:  All pertinent labs reviewed and interpreted  Labs Ordered and Resulted from Time of ED Arrival to Time of ED Departure - No data to display    RADIOLOGY:  No orders to display                ECG:  none    PROCEDURES:  Procedures:  none      FINAL IMPRESSION:    ICD-10-CM    1. Lumbar radiculopathy  M54.16           0 minutes of critical care time      Huey James DO  Emergency Medicine  [facility] EMERGENCY ROOM  3/25/2025  4:22 PM          Huey James MD  03/25/25  [id number]

## 2025-03-25 NOTE — ED TRIAGE NOTES
PT reports right leg pain since 2/26/2024. PT states she has tried heat, ice, TENS unit, and Advil.      Triage Assessment (Adult)       Row Name 03/25/25 1628          Respiratory WDL    Respiratory WDL WDL        Skin Circulation/Temperature WDL    Skin Circulation/Temperature WDL WDL        Cardiac WDL    Cardiac WDL X  HTN        Peripheral/Neurovascular WDL    Peripheral Neurovascular WDL WDL     Capillary Refill, General less than/equal to 3 secs        Rin Coma Scale    Best Eye Response 4-->(E4) spontaneous     Best Motor Response 6-->(M6) obeys commands     Best Verbal Response 5-->(V5) oriented     Rin Coma Scale Score 15

## 2025-05-03 ENCOUNTER — HEALTH MAINTENANCE LETTER (OUTPATIENT)
Age: 65
End: 2025-05-03

## 2025-06-11 ENCOUNTER — HOSPITAL ENCOUNTER (OUTPATIENT)
Facility: HOSPITAL | Age: 65
Setting detail: OBSERVATION
Discharge: HOME | End: 2025-06-11
Attending: EMERGENCY MEDICINE | Admitting: EMERGENCY MEDICINE
Payer: COMMERCIAL

## 2025-06-11 VITALS
RESPIRATION RATE: 15 BRPM | OXYGEN SATURATION: 93 % | HEART RATE: 66 BPM | BODY MASS INDEX: 38.61 KG/M2 | WEIGHT: 232 LBS | DIASTOLIC BLOOD PRESSURE: 67 MMHG | SYSTOLIC BLOOD PRESSURE: 147 MMHG | TEMPERATURE: 98 F

## 2025-06-11 DIAGNOSIS — K81.0 ACUTE CHOLECYSTITIS: ICD-10-CM

## 2025-06-11 LAB
ALBUMIN SERPL BCG-MCNC: 3.7 G/DL (ref 3.5–5.2)
ALP SERPL-CCNC: 72 U/L (ref 40–150)
ALT SERPL W P-5'-P-CCNC: 18 U/L (ref 0–50)
ANION GAP SERPL CALCULATED.3IONS-SCNC: 7 MMOL/L (ref 7–15)
AST SERPL W P-5'-P-CCNC: 15 U/L (ref 0–45)
ATRIAL RATE - MUSE: 73 BPM
BASOPHILS # BLD AUTO: 0 10E3/UL (ref 0–0.2)
BASOPHILS NFR BLD AUTO: 0 %
BILIRUB SERPL-MCNC: 0.3 MG/DL
BUN SERPL-MCNC: 16.1 MG/DL (ref 8–23)
CALCIUM SERPL-MCNC: 9.3 MG/DL (ref 8.8–10.4)
CHLORIDE SERPL-SCNC: 102 MMOL/L (ref 98–107)
CREAT SERPL-MCNC: 0.75 MG/DL (ref 0.51–0.95)
DIASTOLIC BLOOD PRESSURE - MUSE: 87 MMHG
EGFRCR SERPLBLD CKD-EPI 2021: 88 ML/MIN/1.73M2
EOSINOPHIL # BLD AUTO: 0 10E3/UL (ref 0–0.7)
EOSINOPHIL NFR BLD AUTO: 0 %
ERYTHROCYTE [DISTWIDTH] IN BLOOD BY AUTOMATED COUNT: 13.4 % (ref 10–15)
EST. AVERAGE GLUCOSE BLD GHB EST-MCNC: 214 MG/DL
GLUCOSE BLDC GLUCOMTR-MCNC: 200 MG/DL (ref 70–99)
GLUCOSE SERPL-MCNC: 185 MG/DL (ref 70–99)
HBA1C MFR BLD: 9.1 %
HCO3 SERPL-SCNC: 30 MMOL/L (ref 22–29)
HCT VFR BLD AUTO: 33.8 % (ref 35–47)
HGB BLD-MCNC: 10.8 G/DL (ref 11.7–15.7)
IMM GRANULOCYTES # BLD: 0 10E3/UL
IMM GRANULOCYTES NFR BLD: 0 %
INTERPRETATION ECG - MUSE: NORMAL
LIPASE SERPL-CCNC: 37 U/L (ref 13–60)
LYMPHOCYTES # BLD AUTO: 3.1 10E3/UL (ref 0.8–5.3)
LYMPHOCYTES NFR BLD AUTO: 30 %
MCH RBC QN AUTO: 26.8 PG (ref 26.5–33)
MCHC RBC AUTO-ENTMCNC: 32 G/DL (ref 31.5–36.5)
MCV RBC AUTO: 84 FL (ref 78–100)
MONOCYTES # BLD AUTO: 0.5 10E3/UL (ref 0–1.3)
MONOCYTES NFR BLD AUTO: 5 %
NEUTROPHILS # BLD AUTO: 6.7 10E3/UL (ref 1.6–8.3)
NEUTROPHILS NFR BLD AUTO: 65 %
NRBC # BLD AUTO: 0 10E3/UL
NRBC BLD AUTO-RTO: 0 /100
P AXIS - MUSE: 64 DEGREES
PLATELET # BLD AUTO: 271 10E3/UL (ref 150–450)
POTASSIUM SERPL-SCNC: 4.3 MMOL/L (ref 3.4–5.3)
PR INTERVAL - MUSE: 186 MS
PROT SERPL-MCNC: 7.2 G/DL (ref 6.4–8.3)
QRS DURATION - MUSE: 76 MS
QT - MUSE: 366 MS
QTC - MUSE: 403 MS
R AXIS - MUSE: 17 DEGREES
RBC # BLD AUTO: 4.03 10E6/UL (ref 3.8–5.2)
SODIUM SERPL-SCNC: 139 MMOL/L (ref 135–145)
SYSTOLIC BLOOD PRESSURE - MUSE: 190 MMHG
T AXIS - MUSE: 44 DEGREES
VENTRICULAR RATE- MUSE: 73 BPM
WBC # BLD AUTO: 10.3 10E3/UL (ref 4–11)

## 2025-06-11 PROCEDURE — 96361 HYDRATE IV INFUSION ADD-ON: CPT

## 2025-06-11 PROCEDURE — 258N000003 HC RX IP 258 OP 636: Performed by: HOSPITALIST

## 2025-06-11 PROCEDURE — G0378 HOSPITAL OBSERVATION PER HR: HCPCS

## 2025-06-11 PROCEDURE — 258N000003 HC RX IP 258 OP 636: Performed by: EMERGENCY MEDICINE

## 2025-06-11 PROCEDURE — 250N000011 HC RX IP 250 OP 636: Performed by: EMERGENCY MEDICINE

## 2025-06-11 PROCEDURE — 83036 HEMOGLOBIN GLYCOSYLATED A1C: CPT | Performed by: HOSPITALIST

## 2025-06-11 PROCEDURE — 93005 ELECTROCARDIOGRAM TRACING: CPT | Performed by: HOSPITALIST

## 2025-06-11 PROCEDURE — 99221 1ST HOSP IP/OBS SF/LOW 40: CPT | Mod: FS

## 2025-06-11 PROCEDURE — 96374 THER/PROPH/DIAG INJ IV PUSH: CPT

## 2025-06-11 PROCEDURE — 83690 ASSAY OF LIPASE: CPT | Performed by: EMERGENCY MEDICINE

## 2025-06-11 PROCEDURE — 36415 COLL VENOUS BLD VENIPUNCTURE: CPT | Performed by: EMERGENCY MEDICINE

## 2025-06-11 PROCEDURE — 96375 TX/PRO/DX INJ NEW DRUG ADDON: CPT

## 2025-06-11 PROCEDURE — 99207 PR NO BILLABLE SERVICE THIS VISIT: CPT | Performed by: HOSPITALIST

## 2025-06-11 PROCEDURE — 85025 COMPLETE CBC W/AUTO DIFF WBC: CPT | Performed by: EMERGENCY MEDICINE

## 2025-06-11 PROCEDURE — 82962 GLUCOSE BLOOD TEST: CPT

## 2025-06-11 PROCEDURE — 99203 OFFICE O/P NEW LOW 30 MIN: CPT | Mod: FS | Performed by: SPECIALIST

## 2025-06-11 PROCEDURE — 84155 ASSAY OF PROTEIN SERUM: CPT | Performed by: EMERGENCY MEDICINE

## 2025-06-11 PROCEDURE — 250N000011 HC RX IP 250 OP 636: Mod: JZ | Performed by: HOSPITALIST

## 2025-06-11 PROCEDURE — 96365 THER/PROPH/DIAG IV INF INIT: CPT

## 2025-06-11 PROCEDURE — 99238 HOSP IP/OBS DSCHRG MGMT 30/<: CPT | Performed by: HOSPITALIST

## 2025-06-11 PROCEDURE — 99285 EMERGENCY DEPT VISIT HI MDM: CPT | Mod: 25

## 2025-06-11 RX ORDER — DEXTROSE MONOHYDRATE 25 G/50ML
25-50 INJECTION, SOLUTION INTRAVENOUS
Status: DISCONTINUED | OUTPATIENT
Start: 2025-06-11 | End: 2025-06-11 | Stop reason: HOSPADM

## 2025-06-11 RX ORDER — SODIUM CHLORIDE 9 MG/ML
INJECTION, SOLUTION INTRAVENOUS CONTINUOUS
Status: DISCONTINUED | OUTPATIENT
Start: 2025-06-11 | End: 2025-06-11 | Stop reason: HOSPADM

## 2025-06-11 RX ORDER — HYDRALAZINE HYDROCHLORIDE 20 MG/ML
10 INJECTION INTRAMUSCULAR; INTRAVENOUS EVERY 4 HOURS PRN
Status: DISCONTINUED | OUTPATIENT
Start: 2025-06-11 | End: 2025-06-11 | Stop reason: HOSPADM

## 2025-06-11 RX ORDER — NALOXONE HYDROCHLORIDE 0.4 MG/ML
0.4 INJECTION, SOLUTION INTRAMUSCULAR; INTRAVENOUS; SUBCUTANEOUS
Status: DISCONTINUED | OUTPATIENT
Start: 2025-06-11 | End: 2025-06-11 | Stop reason: HOSPADM

## 2025-06-11 RX ORDER — ACETAMINOPHEN 325 MG/1
650 TABLET ORAL EVERY 4 HOURS PRN
Status: DISCONTINUED | OUTPATIENT
Start: 2025-06-11 | End: 2025-06-11 | Stop reason: HOSPADM

## 2025-06-11 RX ORDER — HYDRALAZINE HYDROCHLORIDE 10 MG/1
10 TABLET, FILM COATED ORAL EVERY 4 HOURS PRN
Status: DISCONTINUED | OUTPATIENT
Start: 2025-06-11 | End: 2025-06-11 | Stop reason: HOSPADM

## 2025-06-11 RX ORDER — ALBUTEROL SULFATE 90 UG/1
2 INHALANT RESPIRATORY (INHALATION) EVERY 6 HOURS PRN
Status: DISCONTINUED | OUTPATIENT
Start: 2025-06-11 | End: 2025-06-11 | Stop reason: HOSPADM

## 2025-06-11 RX ORDER — VITAMIN B COMPLEX
50 TABLET ORAL DAILY
Status: DISCONTINUED | OUTPATIENT
Start: 2025-06-11 | End: 2025-06-11 | Stop reason: HOSPADM

## 2025-06-11 RX ORDER — NALOXONE HYDROCHLORIDE 0.4 MG/ML
0.2 INJECTION, SOLUTION INTRAMUSCULAR; INTRAVENOUS; SUBCUTANEOUS
Status: DISCONTINUED | OUTPATIENT
Start: 2025-06-11 | End: 2025-06-11 | Stop reason: HOSPADM

## 2025-06-11 RX ORDER — NICOTINE POLACRILEX 4 MG
15-30 LOZENGE BUCCAL
Status: DISCONTINUED | OUTPATIENT
Start: 2025-06-11 | End: 2025-06-11 | Stop reason: HOSPADM

## 2025-06-11 RX ORDER — ONDANSETRON 2 MG/ML
8 INJECTION INTRAMUSCULAR; INTRAVENOUS EVERY 6 HOURS PRN
Status: DISCONTINUED | OUTPATIENT
Start: 2025-06-11 | End: 2025-06-11

## 2025-06-11 RX ORDER — ONDANSETRON 2 MG/ML
4 INJECTION INTRAMUSCULAR; INTRAVENOUS EVERY 6 HOURS PRN
Status: DISCONTINUED | OUTPATIENT
Start: 2025-06-11 | End: 2025-06-11 | Stop reason: HOSPADM

## 2025-06-11 RX ORDER — LATANOPROST OPHTHALMIC SOLUTION 0.005% 50 UG/ML
1 SOLUTION/ DROPS TOPICAL AT BEDTIME
COMMUNITY
Start: 2025-04-07

## 2025-06-11 RX ORDER — ALBUTEROL SULFATE 0.83 MG/ML
2.5 SOLUTION RESPIRATORY (INHALATION) EVERY 4 HOURS PRN
Status: DISCONTINUED | OUTPATIENT
Start: 2025-06-11 | End: 2025-06-11 | Stop reason: HOSPADM

## 2025-06-11 RX ORDER — ACETAMINOPHEN 650 MG/1
650 SUPPOSITORY RECTAL EVERY 4 HOURS PRN
Status: DISCONTINUED | OUTPATIENT
Start: 2025-06-11 | End: 2025-06-11 | Stop reason: HOSPADM

## 2025-06-11 RX ORDER — HYDROMORPHONE HCL IN WATER/PF 6 MG/30 ML
0.2 PATIENT CONTROLLED ANALGESIA SYRINGE INTRAVENOUS
Status: DISCONTINUED | OUTPATIENT
Start: 2025-06-11 | End: 2025-06-11 | Stop reason: HOSPADM

## 2025-06-11 RX ORDER — CARBOXYMETHYLCELLULOSE SODIUM, GLYCERIN AND POLYSORBATE 80 5; 10; 5 MG/ML; MG/ML; MG/ML
1 SOLUTION/ DROPS OPHTHALMIC DAILY
COMMUNITY

## 2025-06-11 RX ORDER — KRILL/OM-3/DHA/EPA/PHOSPHO/AST 500MG-86MG
1 CAPSULE ORAL DAILY
COMMUNITY

## 2025-06-11 RX ORDER — MEROPENEM 1 G/1
1 INJECTION, POWDER, FOR SOLUTION INTRAVENOUS EVERY 8 HOURS
Status: DISCONTINUED | OUTPATIENT
Start: 2025-06-11 | End: 2025-06-11

## 2025-06-11 RX ORDER — ONDANSETRON 2 MG/ML
8 INJECTION INTRAMUSCULAR; INTRAVENOUS ONCE
Status: COMPLETED | OUTPATIENT
Start: 2025-06-11 | End: 2025-06-11

## 2025-06-11 RX ORDER — MEROPENEM 1 G/1
1 INJECTION, POWDER, FOR SOLUTION INTRAVENOUS ONCE
Status: DISCONTINUED | OUTPATIENT
Start: 2025-06-11 | End: 2025-06-11

## 2025-06-11 RX ADMIN — FAMOTIDINE 20 MG: 10 INJECTION, SOLUTION INTRAVENOUS at 02:43

## 2025-06-11 RX ADMIN — SODIUM CHLORIDE: 0.9 INJECTION, SOLUTION INTRAVENOUS at 06:27

## 2025-06-11 RX ADMIN — SODIUM CHLORIDE 1000 ML: 0.9 INJECTION, SOLUTION INTRAVENOUS at 02:42

## 2025-06-11 RX ADMIN — ONDANSETRON 8 MG: 2 INJECTION, SOLUTION INTRAMUSCULAR; INTRAVENOUS at 02:45

## 2025-06-11 RX ADMIN — MEROPENEM 1 G: 1 INJECTION, POWDER, FOR SOLUTION INTRAVENOUS at 06:28

## 2025-06-11 ASSESSMENT — ACTIVITIES OF DAILY LIVING (ADL)
ADLS_ACUITY_SCORE: 50

## 2025-06-11 NOTE — MEDICATION SCRIBE - ADMISSION MEDICATION HISTORY
Medication Scribe Admission Medication History    Admission medication history is complete. The information provided in this note is only as accurate as the sources available at the time of the update.    Information Source(s): Patient via in-person    Pertinent Information: PTA med list updated per patient.  Patient states that she takes both Insulin N and R bid     Changes made to PTA medication list:  Added: Estee 3, Co-Q10, Krill (per patient)  Deleted: Cyanocobalamin, Diazepam, Zinc oxide (per patient)  Changed: Insulin Regular 15 units to 20 units qam and 15 units qpm, Insulin N from 25 bid to 40 units qam and 15 qpm (per patient)    Allergies reviewed with patient and updates made in EHR: yes    Medication History Completed By: Shannon Sorenson 6/11/2025 6:19 AM    PTA Med List   Medication Sig Last Dose/Taking    albuterol (PROAIR HFA/PROVENTIL HFA/VENTOLIN HFA) 108 (90 Base) MCG/ACT inhaler Inhale 2 puffs into the lungs every 6 hours as needed for shortness of breath or wheezing Taking As Needed    calcium-magnesium (CALMAG) 500-250 MG TABS per tablet Take 2 tablets by mouth every evening 6/10/2025 Evening    Carboxymeth-Glyc-Polysorb PF (REFRESH OPTIVE ESTEE-3) 0.5-1-0.5 % SOLN Apply 1 drop to eye daily. 6/10/2025    Coenzyme Q10 (CO Q 10 PO) Take 1 tablet by mouth daily. 6/10/2025 Morning    insulin  UNIT/ML vial Inject 40 Units subcutaneously every morning. 6/10/2025 Morning    insulin  UNIT/ML vial Inject 15 Units subcutaneously every evening. 6/10/2025 Evening    insulin regular 100 UNIT/ML vial Inject 15 Units subcutaneously daily (with dinner). 6/10/2025 Evening    insulin regular 100 UNIT/ML vial Inject 20 Units subcutaneously every morning (before breakfast). 6/10/2025 Morning    IYUZEH 0.005 % SOLN Place 1 drop into both eyes at bedtime. 6/10/2025 Bedtime    Krill Oil 500 MG CAPS Take 1 capsule by mouth daily. 6/10/2025 Morning    multivitamin w/minerals (THERA-VIT-M) tablet Take 1  tablet by mouth daily 6/10/2025 Morning    vitamin D3 (CHOLECALCIFEROL) 50 mcg (2000 units) tablet Take 50 mcg by mouth daily 3 gummies = 2000 units  6/10/2025 Morning

## 2025-06-11 NOTE — ED NOTES
EMERGENCY DEPARTMENT SIGN OUT NOTE        ED COURSE AND MEDICAL DECISION MAKING  Patient was signed out to me by Dr Tammy Guerrero at 4:40 AM  4:53 AM Spoke with Dr. Noonan, general surgery, who recommends NPO, antibiotics, and admission.  4:58 AM Spoke with pharmacy.  5:08 AM Spoke with Dr. Zaragoza, hospitalist, regarding admission.    In brief, Jo-Ann Jeffery is a 65 year old female who initially presented with abdominal pain.     The patient reports she has had 10 days of ongoing nausea. Yesterday she started to have vomiting. She also reports that recently she has been having upper abdominal pain after eating fatty foods which feels similar to when she has had gallbladder issues in the past which caused gallstone pancreatitis and required an ERCP. She did not have a cholecystectomy at that time. Additionally, she endorses constipation, cough, low grade fever, ear pain, and a burning pain in her throat.       At time of sign out, disposition was pending call with general surgery.    ED Course as of 06/11/25 0640   Wed Jun 11, 2025   0327 Spoke with Dr. Noonan, general surgery, recommended NPO, abx, admission     EKG ordered by hospitalist, my interpretation shows normal sinus rhythm at a rate of 73.  No ST segment changes indicative of emergent ischemia.    FINAL IMPRESSION    1. Acute cholecystitis        ED MEDS  Medications   meropenem (MERREM) 1 g vial to attach to  mL bag (1 g Intravenous $New Bag 6/11/25 0628)   sodium chloride 0.9 % infusion ( Intravenous $New Bag 6/11/25 0627)   HYDROmorphone (DILAUDID) injection 0.2 mg (has no administration in time range)   naloxone (NARCAN) injection 0.2 mg (has no administration in time range)     Or   naloxone (NARCAN) injection 0.4 mg (has no administration in time range)     Or   naloxone (NARCAN) injection 0.2 mg (has no administration in time range)     Or   naloxone (NARCAN) injection 0.4 mg (has no administration in time range)   ondansetron (ZOFRAN)  injection 4 mg (has no administration in time range)   sodium chloride 0.9% BOLUS 1,000 mL (0 mLs Intravenous Stopped 6/11/25 1103)   famotidine (PEPCID) injection 20 mg (20 mg Intravenous $Given 6/11/25 0243)   ondansetron (ZOFRAN) injection 8 mg (8 mg Intravenous $Given 6/11/25 0245)       LAB  Labs Ordered and Resulted from Time of ED Arrival to Time of ED Departure   COMPREHENSIVE METABOLIC PANEL - Abnormal       Result Value    Sodium 139      Potassium 4.3      Carbon Dioxide (CO2) 30 (*)     Anion Gap 7      Urea Nitrogen 16.1      Creatinine 0.75      GFR Estimate 88      Calcium 9.3      Chloride 102      Glucose 185 (*)     Alkaline Phosphatase 72      AST 15      ALT 18      Protein Total 7.2      Albumin 3.7      Bilirubin Total 0.3     CBC WITH PLATELETS AND DIFFERENTIAL - Abnormal    WBC Count 10.3      RBC Count 4.03      Hemoglobin 10.8 (*)     Hematocrit 33.8 (*)     MCV 84      MCH 26.8      MCHC 32.0      RDW 13.4      Platelet Count 271      % Neutrophils 65      % Lymphocytes 30      % Monocytes 5      % Eosinophils 0      % Basophils 0      % Immature Granulocytes 0      NRBCs per 100 WBC 0      Absolute Neutrophils 6.7      Absolute Lymphocytes 3.1      Absolute Monocytes 0.5      Absolute Eosinophils 0.0      Absolute Basophils 0.0      Absolute Immature Granulocytes 0.0      Absolute NRBCs 0.0     LIPASE - Normal    Lipase 37         RADIOLOGY    US Abdomen Limited   Final Result   IMPRESSION:   1.  No visible cholelithiasis.   2. Focal gallbladder wall thickening. Positive sonographic Donovan's sign.    3. Nondependent echogenic foci in the gallbladder could be pneumobilia which was seen on prior CT.   4.  Prominent CBD 8.8 mm.             DISCHARGE MEDS  New Prescriptions    No medications on file       Thomas Kimble MD  [facility] EMERGENCY DEPARTMENT  [address] [place] [id number] [phone]     Thomas Kimble MD  06/11/25 4282

## 2025-06-11 NOTE — DISCHARGE SUMMARY
[facility]  Hospitalist Discharge Summary      Date of Admission:  6/11/2025  Date of Discharge:  6/11/2025  Discharging Provider: Mir Lee DO  Discharge Service: Hospitalist Service    Discharge Diagnoses   Chronic cholecystitis  Insulin-dependent type 2 diabetes  Asthma  Chronic anemia    Clinically Significant Risk Factors     # DMII: A1C = 9.1 % (Ref range: <5.7 %) within past 6 months       Follow-ups Needed After Discharge   Follow-up Appointments       Follow Up      Follow up with General Surgery in their clinic        Hospital Follow-up with Existing Primary Care Provider (PCP)          Schedule Primary Care visit within: 14 Days   Recommended labs and Imaging (to be ordered by Primary Care Provider): CMP, CBC             Unresulted Labs Ordered in the Past 30 Days of this Admission       No orders found from 5/12/2025 to 6/12/2025.            Discharge Disposition   Discharged to home  Condition at discharge: Stable    Hospital Course    Patient is a 65-year-old female with insulin-dependent DM2 and asthma who presents to the ED with Abd pain and found to have cholecystitis. General surgery evaluated and cleared patient to discharge home and follow up as outpatient.     #Cholecystitis  US: No visualized stones, focal GB wall thickening. + sonograph Donovan sign.  Prominent CBD 8.8 mm.  Lipase and LFTs nml  GenSurg consulted    #Diabetes type 2, insulin-dependent  A1c 9.1%  PTA NPH, regular insulin    #Asthma  No sign of exacerbation  PTA albuterol inhaler       Consultations This Hospital Stay   SURGERY GENERAL IP CONSULT    Code Status   Full Code    Time Spent on this Encounter   I, Mir Lee DO, personally saw the patient today and spent less than or equal to 30 minutes discharging this patient.       DO MEJIA [facility] EMERGENCY DEPARTMENT  [facility] [facility] [address]  Phone:  [phone]  ______________________________________________________________________    Physical Exam   Vital Signs: Temp: 98  F (36.7  C) Temp src: Oral BP: (!) 174/76 Pulse: 67   Resp: 15 SpO2: (!) 91 % O2 Device: None (Room air)    Weight: 232 lbs 0 oz       Primary Care Physician   [facility]    Discharge Orders      Reason for your hospital stay    Cholecystitis     Activity    Your activity upon discharge: activity as tolerated     Follow Up    Follow up with General Surgery in their clinic     Diet    Follow this diet upon discharge: Current Diet:Orders Placed This Encounter      Low Fat Diet (up to 50g)     Hospital Follow-up with Existing Primary Care Provider (PCP)            Significant Results and Procedures   Most Recent 3 CBC's:  Recent Labs   Lab Test 06/11/25 0231 04/28/23  1723 04/14/23  0436   WBC 10.3 11.4* 10.6   HGB 10.8* 12.4 11.1*   MCV 84 85 85    308 303     Most Recent 3 BMP's:  Recent Labs   Lab Test 06/11/25  0952 06/11/25 0231 04/28/23  1854 04/28/23  1723 04/14/23  0436   NA  --  139  --  139 135*   POTASSIUM  --  4.3  --  4.1 3.8   CHLORIDE  --  102  --  104 98   CO2  --  30*  --  26 28   BUN  --  16.1  --  17 13.5   CR  --  0.75  --  0.91 0.74   ANIONGAP  --  7  --  9 9   LAKEISHA  --  9.3  --  9.8 9.1   * 185* 222* 243* 277*     Most Recent 2 LFT's:  Recent Labs   Lab Test 06/11/25 0231 04/28/23  1723   AST 15 20   ALT 18 20   ALKPHOS 72 72   BILITOTAL 0.3 0.2     Most Recent Hemoglobin A1c:  Recent Labs   Lab Test 06/11/25 0231   A1C 9.1*   ,   Results for orders placed or performed during the hospital encounter of 06/11/25   US Abdomen Limited    Narrative    EXAM: US ABDOMEN LIMITED  LOCATION: [facility]  DATE: 6/11/2025    INDICATION: ruq pain and vomiting  COMPARISON: 10/22/2024  TECHNIQUE: Limited abdominal ultrasound.    FINDINGS:    GALLBLADDER: No visible cholelithiasis. Sonographic Donovan's sign positive. Wall  thickness in portions gallbladder 5.5 mm. Nondependent echogenic foci within the gallbladder and bile ducts.    BILE DUCTS: No biliary dilatation. The common duct is prominent measures 8.8 mm.    LIVER: Hepatic steatosis. The portal vein is patent with flow in the normal direction.    RIGHT KIDNEY: No hydronephrosis.    PANCREAS: Partial obscuration by bowel gas. Upper normal pancreatic duct.    No ascites.      Impression    IMPRESSION:  1.  No visible cholelithiasis.  2. Focal gallbladder wall thickening. Positive sonographic Donovan's sign.   3. Nondependent echogenic foci in the gallbladder could be pneumobilia which was seen on prior CT.  4.  Prominent CBD 8.8 mm.         Discharge Medications   Current Discharge Medication List        CONTINUE these medications which have NOT CHANGED    Details   albuterol (PROAIR HFA/PROVENTIL HFA/VENTOLIN HFA) 108 (90 Base) MCG/ACT inhaler Inhale 2 puffs into the lungs every 6 hours as needed for shortness of breath or wheezing      calcium-magnesium (CALMAG) 500-250 MG TABS per tablet Take 2 tablets by mouth every evening      Carboxymeth-Glyc-Polysorb PF (REFRESH OPTIVE ESTEE-3) 0.5-1-0.5 % SOLN Apply 1 drop to eye daily.      Coenzyme Q10 (CO Q 10 PO) Take 1 tablet by mouth daily.      !! insulin  UNIT/ML vial Inject 40 Units subcutaneously every morning.      !! insulin  UNIT/ML vial Inject 15 Units subcutaneously every evening.      !! insulin regular 100 UNIT/ML vial Inject 15 Units subcutaneously daily (with dinner).      !! insulin regular 100 UNIT/ML vial Inject 20 Units subcutaneously every morning (before breakfast).      IYUZEH 0.005 % SOLN Place 1 drop into both eyes at bedtime.      Krill Oil 500 MG CAPS Take 1 capsule by mouth daily.      multivitamin w/minerals (THERA-VIT-M) tablet Take 1 tablet by mouth daily      vitamin D3 (CHOLECALCIFEROL) 50 mcg (2000 units) tablet Take 50 mcg by mouth daily 3 gummies = 2000 units        !! - Potential  duplicate medications found. Please discuss with provider.        Allergies   Allergies   Allergen Reactions    Apple Hives    Apple Juice Hives    Azithromycin Hives    Cephalexin Hives    Contrast Dye Shortness Of Breath    Erythromycin Hives     hives      Iron Anaphylaxis    Latex Hives     hands break out with latex gloves    Moxifloxacin Anaphylaxis    Penicillins Hives     nausea      Soap Hives     Tide soap, Soft soap, Hien Dish soap    Strawberry Extract     Sulfamethoxazole-Trimethoprim Nausea    Aminoglycosides Other (See Comments)     ear drops-ears swell up inside  ear drops-ears swell up inside    Amitriptyline     Anesthetics, Amide     Antipyrine-Benzocaine     Cefprozil Hives    Chicken-Derived Products (Egg) Other (See Comments)    Chocolate Flavoring Agent (Non-Screening)      Annotation: pudding      Ciprofloxacin Hives    Clindamycin Other (See Comments)     Severe diarrhea    Clonazepam      Hives      Dairy Products [Milk Protein]      Tachycardia      Diphenhydramine Other (See Comments)     Racing heart    Fd&C Yellow #5 (Tartrazine) Difficulty breathing    Fluoxetine      Hives      Glipizide Other (See Comments)     Muscle pain/muscle twitching    Guaifenesin & Derivatives      Hypertension    Hydrocodone-Acetaminophen     Iodides      swells up, burns     Kiwi     Lactulose Nausea and Vomiting    Loperamide     Memantine     Metformin Headache    Methylprednisolone Nausea and Swelling    Metoclopramide     Morphine     Neomycin      ear drops-ears swell up inside    Prochlorperazine     Pseudoephedrine-Guaifenesin     Quinolones      Other reaction      Sodium Chloride Swelling and Other (See Comments)    Acetic Acid Rash    Antipyrine Rash    Imidazole Antifungals Rash and Unknown    Nuts Palpitations     Chocolate and butterscottch, strawberries, walnuts

## 2025-06-11 NOTE — DISCHARGE INSTRUCTIONS
From your General Surgery Team:  You were seen by [facility] general surgery.  If you do not have an appointment and would like to schedule a follow up appointment with general surgery in clinic, please call us at [phone] to schedule an appointment at your convenience.     We recommend eating a low fat diet until your follow-up with us in clinic. This can help to minimize triggers for gallbladder pain.  We recommend close glucose monitoring before your surgery. Lower A1C and blood sugars (glucose) help the body to heal and have less risk for severe infection after surgery. Blood sugars can be controlled with diet and exercise, as well as medications.

## 2025-06-11 NOTE — ED NOTES
Patient and  given discharge instructions and informed to follow up with general surgery outpatient for surgery. All questions and concerns were addressed. Patient left with all personal belongings.

## 2025-06-11 NOTE — ED TRIAGE NOTES
The patient reports x10 days of pain in both upper abdominal quadrants. She also reports having a fever, cough, headache, ear ache, and body aches. She also reports she injured her back 4 days ago while adjusting her bed. She has been seen by the chiropractor for the back pain.

## 2025-06-11 NOTE — H&P
[facility]    History and Physical - Hospitalist Service       Date of Admission:  6/11/2025    Assessment & Plan    Patient is a 65-year-old female with insulin-dependent DM2 and asthma who presents to the ED with Abd pain and found to have cholecystitis.    #Cholecystitis  US: No visualized stones, focal GB wall thickening. + sonograph Donovan sign.  Prominent CBD 8.8 mm.  Lipase and LFTs nml  GenSurg consulted  NPO w/ ice chips  Merrem   Pain and nausea control    #Diabetes type 2, insulin-dependent  Resume home NPH tonight, hold PTA regular insulin while NPO  SSI  Titrate as needed    #Asthma  No sign of exacerbation  Spot check O2  PTA albuterol inhaler and PRN nebs    #Chronic anemia  Monitor        Diet: NPO for Procedure/Surgery per Anesthesia Guidelines Except for: Ice Chips, Meds; Clear liquids before procedure/surgery: ADULT (Age GREATER than or Equal to 18 years) - Clear liquids 2 hours before procedure/surgery    DVT Prophylaxis: Pneumatic Compression Devices  Stearns Catheter: Not present  Lines: None     Cardiac Monitoring: None  Code Status: Full Code      Clinically Significant Risk Factors Present on Admission                        # Anemia: based on hgb <11                  Disposition Plan     Medically Ready for Discharge: Anticipated in 2-4 Days           Mir Lee DO  Hospitalist Service  [facility]  Securely message with [employer] (more info)  Text page via [employer] Paging/Directory     ______________________________________________________________________    Chief Complaint   Abdominal pain    History is obtained from the patient, electronic health record, emergency department physician, and patient's spouse    History of Present Illness   Patient is a 65-year-old female with insulin-dependent diabetes type 2 and asthma who presents to the emergency department with abdominal pain.  Pain has been in her epigastric area but also had pain in the  right upper quadrant during ultrasound.  She has been feeling nauseous and was vomiting at home.  No fevers, chills, syncope.  Has been feeling constipated, no diarrhea. Reports nasal and sinus congestion but can't take antihistamine because she is allergic.    Past Medical History    Past Medical History:   Diagnosis Date    Alcohol dependence in remission (H) 2/21/2003    ALCOH DEP NEC/NOS-REMISS(aka ALCOHOL)     Cervical Disc Degeneration     Created by Conversion     Depressive disorder     Diabetes mellitus, type 2 (H)     Created by Conversion     Hyperlipidemia     Created by Conversion     Lumbar Disc Degeneration     Created by Conversion     Pancreatitis 8/25/2021       Past Surgical History   Past Surgical History:   Procedure Laterality Date    ENDOSCOPIC RETROGRADE CHOLANGIOPANCREATOGRAM N/A 8/30/2021    Procedure: ENDOSCOPIC RETROGRADE CHOLANGIOPANCREATOGRAPHY WITH SPHINCTEROTOMY;  Surgeon: Stephen Rivas MD;  Location: [facility] OR       Prior to Admission Medications   Prior to Admission Medications   Prescriptions Last Dose Informant Patient Reported? Taking?   Carboxymeth-Glyc-Polysorb PF (REFRESH OPTIVE ESTEE-3) 0.5-1-0.5 % SOLN 6/10/2025  Yes Yes   Sig: Apply 1 drop to eye daily.   Coenzyme Q10 (CO Q 10 PO) 6/10/2025 Morning  Yes Yes   Sig: Take 1 tablet by mouth daily.   IYUZEH 0.005 % SOLN 6/10/2025 Bedtime  Yes Yes   Sig: Place 1 drop into both eyes at bedtime.   Krill Oil 500 MG CAPS 6/10/2025 Morning  Yes Yes   Sig: Take 1 capsule by mouth daily.   albuterol (PROAIR HFA/PROVENTIL HFA/VENTOLIN HFA) 108 (90 Base) MCG/ACT inhaler   Yes Yes   Sig: Inhale 2 puffs into the lungs every 6 hours as needed for shortness of breath or wheezing   calcium-magnesium (CALMAG) 500-250 MG TABS per tablet 6/10/2025 Evening  Yes Yes   Sig: Take 2 tablets by mouth every evening   insulin  UNIT/ML vial 6/10/2025 Morning  Yes Yes   Sig: Inject 40 Units subcutaneously every morning.   insulin   UNIT/ML vial 6/10/2025 Evening  Yes Yes   Sig: Inject 15 Units subcutaneously every evening.   insulin regular 100 UNIT/ML vial 6/10/2025 Morning  Yes Yes   Sig: Inject 20 Units subcutaneously every morning (before breakfast).   insulin regular 100 UNIT/ML vial 6/10/2025 Evening  Yes Yes   Sig: Inject 15 Units subcutaneously daily (with dinner).   multivitamin w/minerals (THERA-VIT-M) tablet 6/10/2025 Morning  Yes Yes   Sig: Take 1 tablet by mouth daily   vitamin D3 (CHOLECALCIFEROL) 50 mcg (2000 units) tablet 6/10/2025 Morning  Yes Yes   Sig: Take 50 mcg by mouth daily 3 gummies = 2000 units       Facility-Administered Medications: None           Physical Exam   Vital Signs: Temp: 98  F (36.7  C) Temp src: Oral BP: (!) 174/76 Pulse: 67   Resp: 15 SpO2: (!) 91 % O2 Device: None (Room air)    Weight: 232 lbs 0 oz    General Appearance:  No acute distress  Respiratory: Clear to auscultation bilaterally  Cardiovascular: Regular rate and rhythm  GI: Normal bowel sounds, abdomen is soft, nondistended with no rebound  Extremities: No peripheral edema or cyanosis  Neuro: Alert and oriented x 3, normal speech    Medical Decision Making             Data

## 2025-06-11 NOTE — CONSULTS
"General Surgery Consultation  Jo-Ann Jeffery MRN# [id number]   Age/Sex: 65 year old female YOB: 1960     Reason for consult: 1. Acute cholecystitis            Requesting physician: Dr. Zaragoza                  Assessment and Plan:   Assessment:  Jo-Ann Jeffery is a 65 year old PMH DM 9.1% on 6/11, chronic pain and many allergies listed, Hx gallstone pancreatitis w/ ERCP with deferred surgery then clinic follow-up deferred surgery by patient. Presents to the ED with bilateral ear pain, back pain radiating bilateral ribs, head cold Sx (HA, postnasal drip, tiredness) with no leukocytosis and labs WNL. Imaging obtained for RUQ pain now localized to ribs without cholelithiasis and with gallbladder wall thickening. Nontender on my exam.  Discussion and no OR availability today, patient would like outpatient follow-up in clinic. Appears reasonable at this time will plan for clinic visit and subsequent surgery    Plan:  -Diet as tolerated - low fat - until outpatient follow-up with better glucose control as able  -Activity as tolerated  -Plan for outpatient clinic appointment and evaluation for future surgery as patient would like to defer at this time  -Patient to call clinic for scheduling, will also message our schedulers about timing on follow-up  -No current surgical intervention planned, surgery will sign off at this time          Chief Complaint:     Chief Complaint   Patient presents with    Abdominal Pain        History is obtained from the patient and electronic health record    HPI:   Jo-Ann Jeffery is a 65 year old female who presents to the ED with head cold symptoms and nausea / emesis worsened recently. She has had a few days of headache (tension), ear pain bilateral, postnasal drip and sinus pressure / pain. Has difficulty sleeping due to coughing from post nasal drip. Denies reflux or abdominal pain. Was moving things in the house and \"threw out her back\" starting with the sacrum and " moving to thoracic with radiation to bilateral ribs and left knee. Is preparing for her husbands knee surgery on June 20th.   Has had some fever and chills - describes 98F temperative 'normally 97'  Only able to use vics vapo rub and cough drops, and nasal electronic neti pot adjacent. Has HEPA filters in her house. Is not able to take other cold medications.     No cardiac or kidney issues, no other medical issues but allergies and DM per patient.    Has not had follow-up for gallbladder since 2021  Denies recurrence of pain that she knows of since then. Denies acholic stools, hematemesis, diarrhea, constipation.    has also had head cold symptoms.  No previous abdominal surgeries. No EGD other than ERCP     Per chart review:  -ERCP 2021 without cholecystectomy for gallstone pancreatitis  -Seen by our clinic outpatient, recommended cholecystectomy but not done 2021 due to fear of needing overnight stay inpatient by patient         Past Medical History:     Past Medical History:   Diagnosis Date    Alcohol dependence in remission (H) 2/21/2003    ALCOH DEP NEC/NOS-REMISS(aka ALCOHOL)     Cervical Disc Degeneration     Created by Conversion     Depressive disorder     Diabetes mellitus, type 2 (H)     Created by Conversion     Hyperlipidemia     Created by Conversion     Lumbar Disc Degeneration     Created by Conversion     Pancreatitis 8/25/2021              Past Surgical History:     Past Surgical History:   Procedure Laterality Date    ENDOSCOPIC RETROGRADE CHOLANGIOPANCREATOGRAM N/A 8/30/2021    Procedure: ENDOSCOPIC RETROGRADE CHOLANGIOPANCREATOGRAPHY WITH SPHINCTEROTOMY;  Surgeon: Stephen Rivas MD;  Location: [facility] Main OR             Social History:    reports that she has never smoked. She has never used smokeless tobacco. She reports that she does not currently use alcohol.           Family History:     Family History   Problem Relation Age of Onset    Syncope Mother     Hypertension  Mother     Cerebrovascular Disease Father 62    Diabetes Father     Cerebrovascular Disease Brother 63    Gallbladder Disease Maternal Grandmother     Breast Cancer Maternal Grandmother     Gallbladder Disease Maternal Aunt     Throat cancer Maternal Aunt               Allergies:     Allergies   Allergen Reactions    Apple Hives    Apple Juice Hives    Azithromycin Hives    Cephalexin Hives    Contrast Dye Shortness Of Breath    Erythromycin Hives     hives      Iron Anaphylaxis    Latex Hives     hands break out with latex gloves    Moxifloxacin Anaphylaxis    Penicillins Hives     nausea      Soap Hives     Tide soap, Soft soap, Hien Dish soap    Strawberry Extract     Sulfamethoxazole-Trimethoprim Nausea    Aminoglycosides Other (See Comments)     ear drops-ears swell up inside  ear drops-ears swell up inside    Amitriptyline     Anesthetics, Amide     Antipyrine-Benzocaine     Cefprozil Hives    Chicken-Derived Products (Egg) Other (See Comments)    Chocolate Flavoring Agent (Non-Screening)      Annotation: pudding      Ciprofloxacin Hives    Clindamycin Other (See Comments)     Severe diarrhea    Clonazepam      Hives      Dairy Products [Milk Protein]      Tachycardia      Diphenhydramine Other (See Comments)     Racing heart    Fd&C Yellow #5 (Tartrazine) Difficulty breathing    Fluoxetine      Hives      Glipizide Other (See Comments)     Muscle pain/muscle twitching    Guaifenesin & Derivatives      Hypertension    Hydrocodone-Acetaminophen     Iodides      swells up, burns     Kiwi     Lactulose Nausea and Vomiting    Loperamide     Memantine     Metformin Headache    Methylprednisolone Nausea and Swelling    Metoclopramide     Morphine     Neomycin      ear drops-ears swell up inside    Prochlorperazine     Pseudoephedrine-Guaifenesin     Quinolones      Other reaction      Sodium Chloride Swelling and Other (See Comments)    Acetic Acid Rash    Antipyrine Rash    Imidazole Antifungals Rash and Unknown     Nuts Palpitations     Chocolate and butterscottch, strawberries, walnuts              Medications:     Prior to Admission medications    Medication Sig Start Date End Date Taking? Authorizing Provider   albuterol (PROAIR HFA/PROVENTIL HFA/VENTOLIN HFA) 108 (90 Base) MCG/ACT inhaler Inhale 2 puffs into the lungs every 6 hours as needed for shortness of breath or wheezing   Yes Unknown, Entered By History   calcium-magnesium (CALMAG) 500-250 MG TABS per tablet Take 2 tablets by mouth every evening   Yes Unknown, Entered By History   Carboxymeth-Glyc-Polysorb PF (REFRESH OPTIVE ESTEE-3) 0.5-1-0.5 % SOLN Apply 1 drop to eye daily.   Yes Reported, Patient   Coenzyme Q10 (CO Q 10 PO) Take 1 tablet by mouth daily.   Yes Reported, Patient   insulin  UNIT/ML vial Inject 40 Units subcutaneously every morning.   Yes Reported, Patient   insulin  UNIT/ML vial Inject 15 Units subcutaneously every evening.   Yes Reported, Patient   insulin regular 100 UNIT/ML vial Inject 15 Units subcutaneously daily (with dinner).   Yes Reported, Patient   insulin regular 100 UNIT/ML vial Inject 20 Units subcutaneously every morning (before breakfast). 10/12/22  Yes Reported, Patient   IYUZEH 0.005 % SOLN Place 1 drop into both eyes at bedtime. 4/7/25  Yes Reported, Patient   Krill Oil 500 MG CAPS Take 1 capsule by mouth daily.   Yes Reported, Patient   multivitamin w/minerals (THERA-VIT-M) tablet Take 1 tablet by mouth daily   Yes Unknown, Entered By History   vitamin D3 (CHOLECALCIFEROL) 50 mcg (2000 units) tablet Take 50 mcg by mouth daily 3 gummies = 2000 units    Yes Unknown, Entered By History              Review of Systems:   The Review of Systems is negative other than noted in the HPI            Physical Exam:   Temp:  [98  F (36.7  C)] 98  F (36.7  C)  Pulse:  [64-75] 67  Resp:  [15-16] 15  BP: (173-217)/(76-91) 174/76  SpO2:  [91 %-97 %] 91 %    No intake or output data in the 24 hours ending 06/11/25 0958    Constitutional:   Awake, alert, cooperative, no apparent distress, and appears stated age       Eyes:   PERRL, conjunctiva/corneas clear, EOM's intact; no scleral edema or icterus noted        ENT:   Normocephalic, without obvious abnormality, atraumatic, Lips, mucosa, and tongue normal        Lungs:   Normal respiratory effort, no accessory muscle use       Abdomen:   Generally soft and nondistended abdomen generally nontender with palpation. Negative murphys sign. Tenderness over right upper quadrant ribs focal area.        Musculoskeletal:   No obvious swelling, bruising or deformity on exposed limbs/skin       Skin:   Skin color and texture normal for patient, no rashes or lesions on exposed skin             Data:         All imaging studies reviewed by me.    Results for orders placed or performed during the hospital encounter of 06/11/25 (from the past 24 hours)   CBC with platelets differential    Narrative    The following orders were created for panel order CBC with platelets differential.  Procedure                               Abnormality         Status                     ---------                               -----------         ------                     CBC with platelets and ...[[id number]]  Abnormal            Final result                 Please view results for these tests on the individual orders.   Comprehensive metabolic panel   Result Value Ref Range    Sodium 139 135 - 145 mmol/L    Potassium 4.3 3.4 - 5.3 mmol/L    Carbon Dioxide (CO2) 30 (H) 22 - 29 mmol/L    Anion Gap 7 7 - 15 mmol/L    Urea Nitrogen 16.1 8.0 - 23.0 mg/dL    Creatinine 0.75 0.51 - 0.95 mg/dL    GFR Estimate 88 >60 mL/min/1.73m2    Calcium 9.3 8.8 - 10.4 mg/dL    Chloride 102 98 - 107 mmol/L    Glucose 185 (H) 70 - 99 mg/dL    Alkaline Phosphatase 72 40 - 150 U/L    AST 15 0 - 45 U/L    ALT 18 0 - 50 U/L    Protein Total 7.2 6.4 - 8.3 g/dL    Albumin 3.7 3.5 - 5.2 g/dL    Bilirubin Total 0.3 <=1.2 mg/dL   Lipase   Result  Value Ref Range    Lipase 37 13 - 60 U/L   CBC with platelets and differential   Result Value Ref Range    WBC Count 10.3 4.0 - 11.0 10e3/uL    RBC Count 4.03 3.80 - 5.20 10e6/uL    Hemoglobin 10.8 (L) 11.7 - 15.7 g/dL    Hematocrit 33.8 (L) 35.0 - 47.0 %    MCV 84 78 - 100 fL    MCH 26.8 26.5 - 33.0 pg    MCHC 32.0 31.5 - 36.5 g/dL    RDW 13.4 10.0 - 15.0 %    Platelet Count 271 150 - 450 10e3/uL    % Neutrophils 65 %    % Lymphocytes 30 %    % Monocytes 5 %    % Eosinophils 0 %    % Basophils 0 %    % Immature Granulocytes 0 %    NRBCs per 100 WBC 0 <1 /100    Absolute Neutrophils 6.7 1.6 - 8.3 10e3/uL    Absolute Lymphocytes 3.1 0.8 - 5.3 10e3/uL    Absolute Monocytes 0.5 0.0 - 1.3 10e3/uL    Absolute Eosinophils 0.0 0.0 - 0.7 10e3/uL    Absolute Basophils 0.0 0.0 - 0.2 10e3/uL    Absolute Immature Granulocytes 0.0 <=0.4 10e3/uL    Absolute NRBCs 0.0 10e3/uL   Hemoglobin A1c   Result Value Ref Range    Estimated Average Glucose 214 (H) <117 mg/dL    Hemoglobin A1C 9.1 (H) <5.7 %   US Abdomen Limited    Narrative    EXAM: US ABDOMEN LIMITED  LOCATION: [facility]  DATE: 6/11/2025    INDICATION: ruq pain and vomiting  COMPARISON: 10/22/2024  TECHNIQUE: Limited abdominal ultrasound.    FINDINGS:    GALLBLADDER: No visible cholelithiasis. Sonographic Donovan's sign positive. Wall thickness in portions gallbladder 5.5 mm. Nondependent echogenic foci within the gallbladder and bile ducts.    BILE DUCTS: No biliary dilatation. The common duct is prominent measures 8.8 mm.    LIVER: Hepatic steatosis. The portal vein is patent with flow in the normal direction.    RIGHT KIDNEY: No hydronephrosis.    PANCREAS: Partial obscuration by bowel gas. Upper normal pancreatic duct.    No ascites.      Impression    IMPRESSION:  1.  No visible cholelithiasis.  2. Focal gallbladder wall thickening. Positive sonographic Donovan's sign.   3. Nondependent echogenic foci in the gallbladder could be pneumobilia  which was seen on prior CT.  4.  Prominent CBD 8.8 mm.     Glucose by meter   Result Value Ref Range    GLUCOSE BY METER POCT 200 (H) 70 - 99 mg/dL           REAL [facility] Surgery  [address] [place] [address] [address]  [place] [address]

## 2025-06-11 NOTE — ED PROVIDER NOTES
EMERGENCY DEPARTMENT ENCOUNTER     NAME: Jo-Ann Jeffery   AGE: 65 year old female   YOB: 1960   MRN: [id number]   EVALUATION DATE & TIME: 6/11/2025  1:59 AM   PCP: Roslyn Dahl     Chief Complaint   Patient presents with    Abdominal Pain   :    FINAL IMPRESSION       No diagnosis found.       ED COURSE & MEDICAL DECISION MAKING      2:10 AM I introduced myself to the patient, obtained patient history, performed a physical exam, and discussed plan for ED workup including potential diagnostic laboratory/imaging studies and interventions.    Pertinent Labs & Imaging studies reviewed. (See chart for details)   65 year old female  presents to the Emergency Department for evaluation of epigastric and right upper quadrant pain, also noting nausea and no vomiting.  On chart review, she had an ERCP during an admission back in 2021 but there are notes that due to feeling unwell the next day she did not have an inpatient cholecystectomy.  She then saw general surgery as an outpatient and they recommended an outpatient laparoscopic cholecystectomy, but the patient preferred inpatient surgical management and it was during the pandemic when nonemergent hospitalizations were avoided and it looks like she never again followed up with surgery.  Case is complicated by numerous allergies including 48 allergies in her chart as well as a history of chronic pain.  I did consider pancreatitis, choledocholithiasis, cholecystitis, GERD, gastritis.  She is afebrile.  Labs do not show leukocytosis and most importantly her LFTs and alk phos are normal with no signs of biliary obstruction.  Right upper quadrant ultrasound reviewed interpreted by me and confirmed by radiology does not show stones but there are some focal areas of gallbladder wall thickening and the patient was noted to have a positive sonographic Donovan sign.  Although the workup does not show signs of choledocholithiasis which is reassuring and I do not  think she needs GI consultation or ERCP during this visit, would be concerning enough for cholecystitis that she should be admitted for inpatient cholecystectomy.  This warrants a discussion with general surgery.  I have paged them to discuss the case but given the late hour I have not yet been able to speak with surgery regarding the case at time of signout.  Therefore I will sign the patient out to Dr. Kimble awaiting general surgery recommendation.        0 minutes critical care time, see procedure note below for details if relevant    Medical Decision Making  I obtained history from Family Member/Significant Other  I reviewed the EMR: Inpatient Record: Admission for gallstone pancreatitis August 25, 2021, office visit with general surgery 9/21/2021  Care impacted by Chronic Pain  Admission considered. Patient was signed out to the oncoming physician, disposition pending.    MIPS (CTPE, Dental pain, Stearns, Sinusitis, Asthma/COPD, Head Trauma): Not Applicable    SEPSIS: None                      MEDICATIONS GIVEN IN THE EMERGENCY:   Medications   sodium chloride 0.9% BOLUS 1,000 mL (has no administration in time range)   famotidine (PEPCID) injection 20 mg (has no administration in time range)   ondansetron (ZOFRAN) injection 8 mg (has no administration in time range)      NEW PRESCRIPTIONS STARTED AT TODAY'S ER VISIT   New Prescriptions    No medications on file     ================================================================   HISTORY OF PRESENT ILLNESS       Patient information was obtained from: Patient   Use of Intrepreter: N/A   Jo-Ann Jeffery is a 65 year old female with history of DM2, HLD, pancreatitis who presents for evaluation of abdominal pain.    The patient reports she has had 10 days of ongoing nausea. Yesterday she started to have vomiting. She also reports that recently she has been having upper abdominal pain after eating fatty foods which feels similar to when she has had gallbladder  issues in the past which caused gallstone pancreatitis and required an ERCP. She did not have a cholecystectomy at that time. Additionally, she endorses constipation, cough, low grade fever, ear pain, and a burning pain in her throat.      ================================================================        PAST HISTORY     PAST MEDICAL HISTORY:   Past Medical History:   Diagnosis Date    Alcohol dependence in remission (H) 2/21/2003    ALCOH DEP NEC/NOS-REMISS(aka ALCOHOL)     Cervical Disc Degeneration     Created by Conversion     Depressive disorder     Diabetes mellitus, type 2 (H)     Created by Conversion     Hyperlipidemia     Created by Conversion     Lumbar Disc Degeneration     Created by Conversion     Pancreatitis 8/25/2021      PAST SURGICAL HISTORY:   Past Surgical History:   Procedure Laterality Date    ENDOSCOPIC RETROGRADE CHOLANGIOPANCREATOGRAM N/A 8/30/2021    Procedure: ENDOSCOPIC RETROGRADE CHOLANGIOPANCREATOGRAPHY WITH SPHINCTEROTOMY;  Surgeon: Stephen Rivas MD;  Location: [facility] Main OR      CURRENT MEDICATIONS:   albuterol (PROAIR HFA/PROVENTIL HFA/VENTOLIN HFA) 108 (90 Base) MCG/ACT inhaler  calcium-magnesium (CALMAG) 500-250 MG TABS per tablet  Cyanocobalamin ([employer] B12 GUMMIES) 500 MCG CHEW  diazepam (VALIUM) 2 MG tablet  insulin lispro prot & lispro (HUMALOG MIX 50/50 VIAL) (50-50) 100 UNIT/ML vial  insulin NPH-Regular 70/30 (NOVOLIN MIX 70/30 VIAL) (70-30) 100 UNIT/ML vial  insulin regular 100 UNIT/ML vial  latanoprost (XALATAN) 0.005 % ophthalmic solution  multivitamin w/minerals (THERA-VIT-M) tablet  vitamin D3 (CHOLECALCIFEROL) 50 mcg (2000 units) tablet  Zinc Oxide-Vitamin C (ZINC PLUS VITAMIN C PO)      ALLERGIES:   Allergies   Allergen Reactions    Apple Hives    Apple Juice Hives    Azithromycin Hives    Cephalexin Hives    Contrast Dye Shortness Of Breath    Erythromycin Hives     hives      Iron Anaphylaxis    Latex Hives     hands break out with latex gloves     Moxifloxacin Anaphylaxis    Penicillins Hives     nausea      Soap Hives     Tide soap, Soft soap, Hien Dish soap    Strawberry Extract     Sulfamethoxazole-Trimethoprim Nausea    Aminoglycosides Other (See Comments)     ear drops-ears swell up inside  ear drops-ears swell up inside    Amitriptyline     Anesthetics, Amide     Antipyrine-Benzocaine     Cefprozil Hives    Chicken-Derived Products (Egg) Other (See Comments)    Chocolate Flavoring Agent (Non-Screening)      Annotation: pudding      Ciprofloxacin Hives    Clindamycin Other (See Comments)     Severe diarrhea    Clonazepam      Hives      Dairy Products [Milk Protein]      Tachycardia      Diphenhydramine Other (See Comments)     Racing heart    Fd&C Yellow #5 (Tartrazine) Difficulty breathing    Fluoxetine      Hives      Glipizide Other (See Comments)     Muscle pain/muscle twitching    Guaifenesin & Derivatives      Hypertension    Hydrocodone-Acetaminophen     Iodides      swells up, burns     Kiwi     Lactulose Nausea and Vomiting    Loperamide     Memantine     Metformin Headache    Methylprednisolone Nausea and Swelling    Metoclopramide     Morphine     Neomycin      ear drops-ears swell up inside    Prochlorperazine     Pseudoephedrine-Guaifenesin     Quinolones      Other reaction      Sodium Chloride Swelling and Other (See Comments)    Acetic Acid Rash    Antipyrine Rash    Imidazole Antifungals Rash and Unknown    Nuts Palpitations     Chocolate and butterscottch, strawberries, walnuts      FAMILY HISTORY:   Family History   Problem Relation Age of Onset    Syncope Mother     Hypertension Mother     Cerebrovascular Disease Father 62    Diabetes Father     Cerebrovascular Disease Brother 63    Gallbladder Disease Maternal Grandmother     Breast Cancer Maternal Grandmother     Gallbladder Disease Maternal Aunt     Throat cancer Maternal Aunt       SOCIAL HISTORY:   Social History     Socioeconomic History    Marital status:    Tobacco Use     Smoking status: Never    Smokeless tobacco: Never   Substance and Sexual Activity    Alcohol use: Not Currently     Social Drivers of Health     Interpersonal Safety: Not At Risk (3/22/2025)    Received from [facility]    Humiliation, Afraid, Rape, and Kick questionnaire     Fear of Current or Ex-Partner: No     Emotionally Abused: No     Physically Abused: No     Sexually Abused: No        VITALS  Patient Vitals for the past 24 hrs:   BP Temp Temp src Pulse Resp SpO2 Weight   06/11/25 0154 (!) 217/91 98  F (36.7  C) Oral 75 16 94 % 105.2 kg (232 lb)        ================================================================    PHYSICAL EXAM     VITAL SIGNS: BP (!) 217/91   Pulse 75   Temp 98  F (36.7  C) (Oral)   Resp 16   Wt 105.2 kg (232 lb)   SpO2 94%   BMI 38.61 kg/m     Constitutional:  Awake, no acute distress   HENT:  Atraumatic, oropharynx without exudate or erythema, membranes moist  Lymph:  No adenopathy  Eyes: EOM intact, PERRL, no injection  Neck: Supple  Respiratory:  Clear to auscultation bilaterally, no wheezes or crackles   Cardiovascular:  Regular rate and rhythm, single S1 and S2   GI:  Soft, RUQ and epigastric tenderness, nondistended, no rebound or guarding   Musculoskeletal:  Moves all extremities, no lower extremity edema, no deformities    Skin:  Warm, dry  Neurologic:  Alert and oriented x3, no focal deficits noted       ================================================================  LAB       All pertinent labs reviewed and interpreted.   Labs Ordered and Resulted from Time of ED Arrival to Time of ED Departure   COMPREHENSIVE METABOLIC PANEL - Abnormal       Result Value    Sodium 139      Potassium 4.3      Carbon Dioxide (CO2) 30 (*)     Anion Gap 7      Urea Nitrogen 16.1      Creatinine 0.75      GFR Estimate 88      Calcium 9.3      Chloride 102      Glucose 185 (*)     Alkaline Phosphatase 72      AST 15      ALT 18      Protein Total 7.2      Albumin 3.7      Bilirubin  Total 0.3     CBC WITH PLATELETS AND DIFFERENTIAL - Abnormal    WBC Count 10.3      RBC Count 4.03      Hemoglobin 10.8 (*)     Hematocrit 33.8 (*)     MCV 84      MCH 26.8      MCHC 32.0      RDW 13.4      Platelet Count 271      % Neutrophils 65      % Lymphocytes 30      % Monocytes 5      % Eosinophils 0      % Basophils 0      % Immature Granulocytes 0      NRBCs per 100 WBC 0      Absolute Neutrophils 6.7      Absolute Lymphocytes 3.1      Absolute Monocytes 0.5      Absolute Eosinophils 0.0      Absolute Basophils 0.0      Absolute Immature Granulocytes 0.0      Absolute NRBCs 0.0     LIPASE - Normal    Lipase 37          ===============================================================  RADIOLOGY       Reviewed all pertinent imaging. Please see official radiology report.   US Abdomen Limited   Final Result   IMPRESSION:   1.  No visible cholelithiasis.   2. Focal gallbladder wall thickening. Positive sonographic Donovan's sign.    3. Nondependent echogenic foci in the gallbladder could be pneumobilia which was seen on prior CT.   4.  Prominent CBD 8.8 mm.               ================================================================  EKG         I have independently reviewed and interpreted the EKG(s) documented above.     ================================================================  PROCEDURES         I, Erick Palumbo, am serving as a scribe to document services personally performed by Dr. Bose based on my observation and the provider's statements to me. I, Tammy Bose MD attest that Erick Palumbo is acting in a scribe capacity, has observed my performance of the services and has documented them in accordance with my direction.     Tammy Bose M.D.   Emergency [facility] EMERGENCY DEPARTMENT  [facility] [facility] [address]  [phone]  Dept: [phone]      Tammy Bose MD  06/11/25 1231

## 2025-06-11 NOTE — ED TRIAGE NOTES
Triage Assessment (Adult)       Row Name 06/11/25 0158          Triage Assessment    Airway WDL WDL        Cognitive/Neuro/Behavioral WDL    Cognitive/Neuro/Behavioral WDL WDL

## 2025-06-11 NOTE — ED NOTES
Patient noting she cannot have ordered insulin as it causes her heart to race and her stomach to cramp. Patient asking for novolin insulin instead. Provider notified.

## 2025-06-11 NOTE — UTILIZATION REVIEW
"Admission Status; Secondary Review Determination      Under the authority of the Utilization Management Committee, the utilization review process indicated a secondary review on the above patient.  The review outcome is based on review of the medical records, discussions with staff, and applying clinical experience noted on the date of the review.        (x) Observation Status Appropriate - This patient does not meet hospital inpatient criteria and is placed in observation status. If this patient's primary payer is Medicare and was admitted as an inpatient, Condition Code 44 should be used and patient status changed to \"observation\".      RATIONALE FOR DETERMINATION:       65-year-old female  History type 2 diabetes, chronic pain, gallstone pancreatitis  Presented to the ED on June 11 with abdominal pain and nausea with vomiting  Laboratory workup showed metabolic alkalosis, hyperglycemia, and normocytic anemia.  Abdominal ultrasound showed gallbladder wall thickening without cholelithiasis.  Empiric meropenem initiated.  Consultation with general surgery was requested.     The severity of illness, intensity of service provided, expected LOS and risk for adverse outcome make the care appropriate for further observation; however, doesn't meet criteria for hospital inpatient admission. This was discussed with attending physician Dr. Lee who concurred with this determination.     The information on this document is developed by the utilization review team in order for the business office to ensure compliance.  This only denotes the appropriateness of proper admission status and does not reflect the quality of care rendered.         The definitions of Inpatient Status and Observation Status used in making the determination above are those provided in the CMS Coverage Manual, Chapter 1 and Chapter 6, section 70.4.      Sincerely,      lFy Kidd MD, MPH  [facility]  Office # [phone]    "

## (undated) DEVICE — AUTOTOME 44 20MM SHORT WIRE SYSTEM M00545170

## (undated) DEVICE — TUBING SUCTION MEDI-VAC 1/4"X20' N620A - HE

## (undated) DEVICE — SUCTION MANIFOLD NEPTUNE 2 SYS 1 PORT 702-025-000

## (undated) DEVICE — PLATE GROUNDING ADULT W/CORD 9165L

## (undated) DEVICE — WIRE GUIDE 0.035"X260CM DREAMWIRE M00556100

## (undated) DEVICE — BALLOON EXTRACTION 15X1950MM 3.2MM TL B-V243Q-A

## (undated) DEVICE — SOL WATER IRRIG 1000ML BOTTLE 2F7114